# Patient Record
Sex: FEMALE | Race: BLACK OR AFRICAN AMERICAN | Employment: OTHER | URBAN - METROPOLITAN AREA
[De-identification: names, ages, dates, MRNs, and addresses within clinical notes are randomized per-mention and may not be internally consistent; named-entity substitution may affect disease eponyms.]

---

## 2017-03-01 ENCOUNTER — IP HISTORICAL/CONVERTED ENCOUNTER (OUTPATIENT)
Dept: OTHER | Age: 82
End: 2017-03-01

## 2018-06-04 ENCOUNTER — IP HISTORICAL/CONVERTED ENCOUNTER (OUTPATIENT)
Dept: OTHER | Age: 83
End: 2018-06-04

## 2018-06-19 ENCOUNTER — OP HISTORICAL/CONVERTED ENCOUNTER (OUTPATIENT)
Dept: OTHER | Age: 83
End: 2018-06-19

## 2020-12-11 ENCOUNTER — APPOINTMENT (OUTPATIENT)
Dept: GENERAL RADIOLOGY | Age: 85
DRG: 388 | End: 2020-12-11
Attending: EMERGENCY MEDICINE
Payer: MEDICARE

## 2020-12-11 ENCOUNTER — APPOINTMENT (OUTPATIENT)
Dept: CT IMAGING | Age: 85
DRG: 388 | End: 2020-12-11
Attending: EMERGENCY MEDICINE
Payer: MEDICARE

## 2020-12-11 ENCOUNTER — HOSPITAL ENCOUNTER (INPATIENT)
Age: 85
LOS: 20 days | Discharge: NURSING, REHAB OR LONG-TERM CARE | DRG: 388 | End: 2020-12-31
Attending: EMERGENCY MEDICINE | Admitting: COLON & RECTAL SURGERY
Payer: MEDICARE

## 2020-12-11 DIAGNOSIS — K56.601 COMPLETE SMALL BOWEL OBSTRUCTION (HCC): ICD-10-CM

## 2020-12-11 DIAGNOSIS — E87.6 HYPOKALEMIA: ICD-10-CM

## 2020-12-11 DIAGNOSIS — E86.0 DEHYDRATION DETERMINED BY EXAMINATION: ICD-10-CM

## 2020-12-11 DIAGNOSIS — R10.9 ACUTE ABDOMINAL PAIN: Primary | ICD-10-CM

## 2020-12-11 DIAGNOSIS — R64 CACHEXIA (HCC): ICD-10-CM

## 2020-12-11 DIAGNOSIS — R79.89 PRERENAL AZOTEMIA: ICD-10-CM

## 2020-12-11 DIAGNOSIS — K56.609 SMALL BOWEL OBSTRUCTION (HCC): ICD-10-CM

## 2020-12-11 LAB
ALBUMIN SERPL-MCNC: 3.2 G/DL (ref 3.5–5)
ALBUMIN/GLOB SERPL: 0.8 {RATIO} (ref 1.1–2.2)
ALP SERPL-CCNC: 57 U/L (ref 45–117)
ALT SERPL-CCNC: 17 U/L (ref 12–78)
ANION GAP SERPL CALC-SCNC: 8 MMOL/L (ref 5–15)
AST SERPL W P-5'-P-CCNC: 18 U/L (ref 15–37)
BASOPHILS # BLD: 0 K/UL (ref 0–0.1)
BASOPHILS NFR BLD: 0 % (ref 0–1)
BILIRUB SERPL-MCNC: 0.7 MG/DL (ref 0.2–1)
BUN SERPL-MCNC: 54 MG/DL (ref 6–20)
BUN/CREAT SERPL: 37 (ref 12–20)
CA-I BLD-MCNC: 9.3 MG/DL (ref 8.5–10.1)
CHLORIDE SERPL-SCNC: 105 MMOL/L (ref 97–108)
CO2 SERPL-SCNC: 30 MMOL/L (ref 21–32)
CREAT SERPL-MCNC: 1.45 MG/DL (ref 0.55–1.02)
DIFFERENTIAL METHOD BLD: ABNORMAL
EOSINOPHIL # BLD: 0 K/UL (ref 0–0.4)
EOSINOPHIL NFR BLD: 0 % (ref 0–7)
ERYTHROCYTE [DISTWIDTH] IN BLOOD BY AUTOMATED COUNT: 15.2 % (ref 11.5–14.5)
GLOBULIN SER CALC-MCNC: 3.8 G/DL (ref 2–4)
GLUCOSE SERPL-MCNC: 95 MG/DL (ref 65–100)
HCT VFR BLD AUTO: 39.2 % (ref 35–47)
HGB BLD-MCNC: 12.9 G/DL (ref 11.5–16)
IMM GRANULOCYTES # BLD AUTO: 0 K/UL (ref 0–0.04)
IMM GRANULOCYTES NFR BLD AUTO: 0 % (ref 0–0.5)
LIPASE SERPL-CCNC: 82 U/L (ref 73–393)
LYMPHOCYTES # BLD: 0.5 K/UL (ref 0.8–3.5)
LYMPHOCYTES NFR BLD: 11 % (ref 12–49)
MAGNESIUM SERPL-MCNC: 2.7 MG/DL (ref 1.6–2.4)
MCH RBC QN AUTO: 27.6 PG (ref 26–34)
MCHC RBC AUTO-ENTMCNC: 32.9 G/DL (ref 30–36.5)
MCV RBC AUTO: 83.9 FL (ref 80–99)
MONOCYTES # BLD: 0.4 K/UL (ref 0–1)
MONOCYTES NFR BLD: 8 % (ref 5–13)
NEUTS SEG # BLD: 3.9 K/UL (ref 1.8–8)
NEUTS SEG NFR BLD: 81 % (ref 32–75)
PHOSPHATE SERPL-MCNC: 4.4 MG/DL (ref 2.6–4.7)
PLATELET # BLD AUTO: 384 K/UL (ref 150–400)
PMV BLD AUTO: 10.9 FL (ref 8.9–12.9)
POTASSIUM SERPL-SCNC: 3.4 MMOL/L (ref 3.5–5.1)
PROT SERPL-MCNC: 7 G/DL (ref 6.4–8.2)
RBC # BLD AUTO: 4.67 M/UL (ref 3.8–5.2)
SODIUM SERPL-SCNC: 143 MMOL/L (ref 136–145)
WBC # BLD AUTO: 4.8 K/UL (ref 3.6–11)

## 2020-12-11 PROCEDURE — 85025 COMPLETE CBC W/AUTO DIFF WBC: CPT

## 2020-12-11 PROCEDURE — 36415 COLL VENOUS BLD VENIPUNCTURE: CPT

## 2020-12-11 PROCEDURE — 87040 BLOOD CULTURE FOR BACTERIA: CPT

## 2020-12-11 PROCEDURE — 99284 EMERGENCY DEPT VISIT MOD MDM: CPT

## 2020-12-11 PROCEDURE — 83690 ASSAY OF LIPASE: CPT

## 2020-12-11 PROCEDURE — 0D9670Z DRAINAGE OF STOMACH WITH DRAINAGE DEVICE, VIA NATURAL OR ARTIFICIAL OPENING: ICD-10-PCS | Performed by: COLON & RECTAL SURGERY

## 2020-12-11 PROCEDURE — 74022 RADEX COMPL AQT ABD SERIES: CPT

## 2020-12-11 PROCEDURE — 74011250636 HC RX REV CODE- 250/636: Performed by: COLON & RECTAL SURGERY

## 2020-12-11 PROCEDURE — 83735 ASSAY OF MAGNESIUM: CPT

## 2020-12-11 PROCEDURE — 74176 CT ABD & PELVIS W/O CONTRAST: CPT

## 2020-12-11 PROCEDURE — 74011250636 HC RX REV CODE- 250/636: Performed by: EMERGENCY MEDICINE

## 2020-12-11 PROCEDURE — 65270000029 HC RM PRIVATE

## 2020-12-11 PROCEDURE — 84100 ASSAY OF PHOSPHORUS: CPT

## 2020-12-11 PROCEDURE — 99222 1ST HOSP IP/OBS MODERATE 55: CPT | Performed by: COLON & RECTAL SURGERY

## 2020-12-11 PROCEDURE — 3E0336Z INTRODUCTION OF NUTRITIONAL SUBSTANCE INTO PERIPHERAL VEIN, PERCUTANEOUS APPROACH: ICD-10-PCS | Performed by: COLON & RECTAL SURGERY

## 2020-12-11 PROCEDURE — 80053 COMPREHEN METABOLIC PANEL: CPT

## 2020-12-11 RX ORDER — FENTANYL CITRATE 50 UG/ML
10 INJECTION, SOLUTION INTRAMUSCULAR; INTRAVENOUS
Status: DISPENSED | OUTPATIENT
Start: 2020-12-11 | End: 2020-12-12

## 2020-12-11 RX ORDER — ONDANSETRON 2 MG/ML
4 INJECTION INTRAMUSCULAR; INTRAVENOUS
Status: DISCONTINUED | OUTPATIENT
Start: 2020-12-11 | End: 2020-12-31 | Stop reason: HOSPADM

## 2020-12-11 RX ORDER — FENTANYL CITRATE 50 UG/ML
25 INJECTION, SOLUTION INTRAMUSCULAR; INTRAVENOUS
Status: COMPLETED | OUTPATIENT
Start: 2020-12-11 | End: 2020-12-11

## 2020-12-11 RX ORDER — SODIUM CHLORIDE 9 MG/ML
125 INJECTION, SOLUTION INTRAVENOUS CONTINUOUS
Status: DISCONTINUED | OUTPATIENT
Start: 2020-12-11 | End: 2020-12-15

## 2020-12-11 RX ADMIN — FENTANYL CITRATE 25 MCG: 50 INJECTION INTRAMUSCULAR; INTRAVENOUS at 18:38

## 2020-12-11 RX ADMIN — SODIUM CHLORIDE 75 ML/HR: 9 INJECTION, SOLUTION INTRAVENOUS at 18:37

## 2020-12-11 RX ADMIN — FAMOTIDINE 20 MG: 10 INJECTION INTRAVENOUS at 21:58

## 2020-12-11 RX ADMIN — SODIUM CHLORIDE 75 ML/HR: 9 INJECTION, SOLUTION INTRAVENOUS at 22:05

## 2020-12-11 NOTE — ED PROVIDER NOTES
EMERGENCY DEPARTMENT HISTORY AND PHYSICAL EXAM        Date: 12/11/2020  Patient Name: Christy Reed    History of Presenting Illness     Chief Complaint   Patient presents with    Abdominal Pain       History Provided By: Patient    HPI: Christy Reed, 80 y.o. female with a history of HTN presents to the ED c/o intermittent abdominal pain that began 2 weeks ago. Pt has accompanying constipation. Vomiting, and decreased appetite, sweats, and intermittent nausea. She denies any dysuria. Her episodes last 2-3 days. Pt had previous episodes of constipation in the past, but her current symptoms are not similar. Pt took her HTN medications this morning. She is uncertain of what her previous abdominal surgery was for. She normally ambulates with a cane; she denies any falls. Pt normally lives at home alone, but has her niece visits her. PCP: Chay Gudino MD    Current Facility-Administered Medications   Medication Dose Route Frequency Provider Last Rate Last Dose    metoprolol (LOPRESSOR) injection 2.5 mg  2.5 mg IntraVENous Q4H Lisy Kumar, NP   2.5 mg at 12/13/20 1616    acetaminophen (TYLENOL) suppository 650 mg  650 mg Rectal Q4H PRN Lisy Kumar NP        famotidine (PF) (PEPCID) 20 mg in 0.9% sodium chloride 10 mL injection  20 mg IntraVENous Q12H Shan Alejandro MD   20 mg at 12/13/20 6175    ondansetron (ZOFRAN) injection 4 mg  4 mg IntraVENous Q6H PRN Shan Alejandro MD        0.9% sodium chloride infusion  125 mL/hr IntraVENous CONTINUOUS Lisy Kumar  mL/hr at 12/13/20 1231 125 mL/hr at 12/13/20 1231       Past History     Past Medical History:  Past Medical History:   Diagnosis Date    Hypertension        Past Surgical History:  History reviewed. No pertinent surgical history. Family History:  History reviewed. No pertinent family history.     Social History:  Social History     Tobacco Use    Smoking status: Never Smoker    Smokeless tobacco: Never Used   Substance Use Topics    Alcohol use: Not Currently     Frequency: Never    Drug use: Not Currently       Allergies:  No Known Allergies    Review of Systems   Review of Systems   Constitutional: Positive for appetite change. Negative for chills, diaphoresis and fever. HENT: Negative for congestion, sore throat and trouble swallowing. Eyes: Negative for visual disturbance. Respiratory: Negative for cough and shortness of breath. Cardiovascular: Negative for chest pain and palpitations. Gastrointestinal: Positive for abdominal pain, constipation, nausea and vomiting. Negative for diarrhea. Endocrine: Negative for polydipsia, polyphagia and polyuria. Genitourinary: Negative for dysuria, frequency, hematuria and urgency. Musculoskeletal: Negative for gait problem and neck pain. Skin: Negative for rash. Neurological: Negative for dizziness, syncope and headaches. Physical Exam   Physical Exam  Constitutional:       General: She is not in acute distress. Appearance: She is well-developed. She is not ill-appearing. Comments: Cachectic and frail AA female. Good historian, intermittently stops giving history due to abdominal pain. Somnolent but easily arousal. Pt reports that she lives alone and has her niece check up on her. HENT:      Head: Normocephalic and atraumatic. Nose: Nose normal.      Mouth/Throat:      Mouth: Mucous membranes are moist.      Pharynx: No posterior oropharyngeal erythema. Eyes:      General: Vision grossly intact. Extraocular Movements: Extraocular movements intact. Conjunctiva/sclera: Conjunctivae normal.      Pupils: Pupils are equal, round, and reactive to light. Neck:      Musculoskeletal: Neck supple. Vascular: No JVD. Trachea: No tracheal deviation. Cardiovascular:      Rate and Rhythm: Normal rate and regular rhythm. Pulses: Normal pulses.            Carotid pulses are 2+ on the right side and 2+ on the left side.       Radial pulses are 2+ on the right side and 2+ on the left side. Femoral pulses are 2+ on the right side and 2+ on the left side. Popliteal pulses are 2+ on the right side and 2+ on the left side. Dorsalis pedis pulses are 2+ on the right side and 2+ on the left side. Posterior tibial pulses are 2+ on the right side and 2+ on the left side. Heart sounds: Normal heart sounds. Pulmonary:      Effort: Pulmonary effort is normal.      Breath sounds: Normal breath sounds and air entry. No wheezing or rhonchi. Abdominal:      General: Abdomen is flat. Bowel sounds are absent. Palpations: Abdomen is soft. Tenderness: There is no abdominal tenderness. There is no guarding or rebound. Comments: Well healed midline surgical scar; no palpable masses. Diffuse abd pain with palpation--no rebound; positve guarding   Musculoskeletal:         General: No swelling or deformity. Right shoulder: She exhibits normal range of motion and no swelling. Right lower leg: No edema. Left lower leg: No edema. Skin:     General: Skin is warm and dry. Capillary Refill: Capillary refill takes less than 2 seconds. Findings: No signs of injury or rash. Neurological:      General: No focal deficit present. Mental Status: She is alert and oriented to person, place, and time. Cranial Nerves: No cranial nerve deficit. Comments: Normal Speech,    Psychiatric:         Attention and Perception: Attention normal.         Mood and Affect: Mood and affect normal.         Speech: Speech normal.         Behavior: Behavior is cooperative.          Diagnostic Study Results     Labs -     Recent Results (from the past 12 hour(s))   CBC WITH AUTOMATED DIFF    Collection Time: 12/13/20  7:26 AM   Result Value Ref Range    WBC 7.8 3.6 - 11.0 K/uL    RBC 4.06 3.80 - 5.20 M/uL    HGB 10.8 (L) 11.5 - 16.0 g/dL    HCT 34.3 (L) 35.0 - 47.0 %    MCV 84.5 80.0 - 99.0 FL MCH 26.6 26.0 - 34.0 PG    MCHC 31.5 30.0 - 36.5 g/dL    RDW 15.1 (H) 11.5 - 14.5 %    PLATELET 656 254 - 848 K/uL    MPV 10.9 8.9 - 12.9 FL    NEUTROPHILS 87 (H) 32 - 75 %    LYMPHOCYTES 6 (L) 12 - 49 %    MONOCYTES 7 5 - 13 %    EOSINOPHILS 0 0 - 7 %    BASOPHILS 0 0 - 1 %    IMMATURE GRANULOCYTES 0 0.0 - 0.5 %    ABS. NEUTROPHILS 6.7 1.8 - 8.0 K/UL    ABS. LYMPHOCYTES 0.5 (L) 0.8 - 3.5 K/UL    ABS. MONOCYTES 0.5 0.0 - 1.0 K/UL    ABS. EOSINOPHILS 0.0 0.0 - 0.4 K/UL    ABS. BASOPHILS 0.0 0.0 - 0.1 K/UL    ABS. IMM. GRANS. 0.0 0.00 - 0.04 K/UL    DF AUTOMATED     METABOLIC PANEL, BASIC    Collection Time: 12/13/20  7:26 AM   Result Value Ref Range    Sodium 147 (H) 136 - 145 mmol/L    Potassium 3.3 (L) 3.5 - 5.1 mmol/L    Chloride 111 (H) 97 - 108 mmol/L    CO2 23 21 - 32 mmol/L    Anion gap 13 5 - 15 mmol/L    Glucose 58 (L) 65 - 100 mg/dL    BUN 58 (H) 6 - 20 mg/dL    Creatinine 1.19 (H) 0.55 - 1.02 mg/dL    BUN/Creatinine ratio 49 (H) 12 - 20      GFR est AA 51 (L) >60 ml/min/1.73m2    GFR est non-AA 42 (L) >60 ml/min/1.73m2    Calcium 8.7 8.5 - 10.1 mg/dL       Radiologic Studies -   XR ABD (KUB)   Final Result      CT ABD PELV WO CONT   Final Result   Impression:   1. High-grade small bowel obstruction with a transition point in the mid   abdomen involving the distal small bowel. No evidence of perforation. Small   amount of ascites. 2.  Small bilateral pleural effusions. 3.  Large volume of stool within the rectum consistent with constipation. 4.  Multiple bilateral hyperattenuating renal lesions, likely consistent with   proteinaceous or hemorrhagic cysts. If indicated, consider follow-up CT in 6   months. 5.  Coronary artery disease and atherosclerotic disease of the aorta. XR ABD ACUTE W 1 V CHEST   Final Result   Impression:   1. No acute cardiopulmonary process. Hyperlucent lungs could indicate COPD   changes. 2.  Dilated loop of bowel in the midabdomen, possibly small bowel. Recommend CT   of the abdomen and pelvis for further evaluation. CT ABD PELV WO CONT    (Results Pending)     CT Results  (Last 48 hours)    None        CXR Results  (Last 48 hours)    None          Medical Decision Making and ED Course     I reviewed the vital signs, available nursing notes, past medical history, past surgical history, family history and social history. Vital Signs - Reviewed the patient's vital signs. Patient Vitals for the past 12 hrs:   Temp Pulse Resp BP SpO2   12/13/20 1613 98.9 °F (37.2 °C) 98  (!) 163/78    12/13/20 1504 (!) 101.8 °F (38.8 °C) 73 20 (!) 164/76 98 %   12/13/20 1232  89  (!) 161/73    12/13/20 0830 98.4 °F (36.9 °C) 85 20 (!) 162/74 98 %         Records Reviewed: Nursing Notes, Old Medical Records, Previous electrocardiograms and Ambulance Run Sheet        Medical Decision Making/DIFF DX:     Very pleasant nonagenarian presents with several weeks of intermittent abd pain that is acute worse this evening  Hx and exam concerning for bowel obstruction but volvulus and ileus also a concern. ABD series demonstrates high grade obstruction ( call from rad) and awaiting CT read that is already ordered. Will consult GS on call. Will do screening labs,fluid resuscitation as pt is clinically very dry, pain medications and planned admission. NGT has also been ordered for nursing placement     Diff dx discussed with patient who acknowledges understanding. .. Procedures         Disposition     ADMITTED       Diagnosis     Clinical impression:   1. Acute abdominal pain    2. Complete small bowel obstruction (HCC)    3. Dehydration determined by examination    4. Cachexia ([address]    5. Hypokalemia    6. Prerenal azotemia           Attestation:  I, [Dave Otoole] am scribing for, and in the presence of Dr. Madelyn Rodriguez MD]   I, Dr Madelyn Rodriguez MD], have reviewed any and all documentation by a scribe and authenticated its accuracy.

## 2020-12-11 NOTE — H&P
Surgery History and Physical    Subjective:      Mitali Allen is a 80 y.o. female who presents for evaluation of abdominal pain accompanied by nausea and vomiting. . The pain is described as sharp, cramping, intermittent. Onset was a few days ago. Patient is unable to give me any aggravating or alleviating factors. She does state that she thought she had constipation but then started vomiting. She is a fairly poor historian unable to obtain any significant past medical or past surgical history. Past Medical History:   Diagnosis Date    Hypertension      History reviewed. No pertinent surgical history. History reviewed. No pertinent family history. Social History     Tobacco Use    Smoking status: Never Smoker    Smokeless tobacco: Never Used   Substance Use Topics    Alcohol use: Not Currently     Frequency: Never      Prior to Admission medications    Not on File      No Known Allergies    Review of Systems:  Review of Systems   Unable to perform ROS: Mental acuity       Objective:        Patient Vitals for the past 8 hrs:   BP Temp Pulse Resp SpO2 Height Weight   20 1407 (!) 154/90 98.5 °F (36.9 °C) 65 16 98 % 5' 3\" (1.6 m) 110 lb (49.9 kg)       Temp (24hrs), Av.5 °F (36.9 °C), Min:98.5 °F (36.9 °C), Max:98.5 °F (36.9 °C)      Physical Exam:  Physical Exam  Constitutional:       General: She is not in acute distress. Appearance: She is not ill-appearing. Comments: Frail elderly female   HENT:      Head: Normocephalic and atraumatic. Neck:      Musculoskeletal: Normal range of motion. Cardiovascular:      Rate and Rhythm: Normal rate and regular rhythm. Pulmonary:      Effort: Pulmonary effort is normal.      Breath sounds: Normal breath sounds. Abdominal:      General: There is distension. Palpations: Abdomen is soft. Tenderness: There is abdominal tenderness.       Comments: Abdomen is mildly distended, tender palpation, there is no rebound or guarding  Well-healed lower abdominal incision   Musculoskeletal: Normal range of motion.   Skin:     General: Skin is warm and dry.   Neurological:      General: No focal deficit present.   Psychiatric:         Mood and Affect: Mood normal.       Recent Results (from the past 24 hour(s))   CBC WITH AUTOMATED DIFF    Collection Time: 12/11/20  5:00 PM   Result Value Ref Range    WBC 4.8 3.6 - 11.0 K/uL    RBC 4.67 3.80 - 5.20 M/uL    HGB 12.9 11.5 - 16.0 g/dL    HCT 39.2 35.0 - 47.0 %    MCV 83.9 80.0 - 99.0 FL    MCH 27.6 26.0 - 34.0 PG    MCHC 32.9 30.0 - 36.5 g/dL    RDW 15.2 (H) 11.5 - 14.5 %    PLATELET 384 150 - 400 K/uL    MPV 10.9 8.9 - 12.9 FL    NEUTROPHILS 81 (H) 32 - 75 %    LYMPHOCYTES 11 (L) 12 - 49 %    MONOCYTES 8 5 - 13 %    EOSINOPHILS 0 0 - 7 %    BASOPHILS 0 0 - 1 %    IMMATURE GRANULOCYTES 0 0.0 - 0.5 %    ABS. NEUTROPHILS 3.9 1.8 - 8.0 K/UL    ABS. LYMPHOCYTES 0.5 (L) 0.8 - 3.5 K/UL    ABS. MONOCYTES 0.4 0.0 - 1.0 K/UL    ABS. EOSINOPHILS 0.0 0.0 - 0.4 K/UL    ABS. BASOPHILS 0.0 0.0 - 0.1 K/UL    ABS. IMM. GRANS. 0.0 0.00 - 0.04 K/UL    DF AUTOMATED         Radiology:  CT abdomen pelvis:  GI tract/bowel: Fluid is seen in the distal esophagus. The stomach is distended  with fluid and air. The small bowel is not significantly distended the terminal  ileum and distal small bowel is decompressed. Within the mid abdomen there is a  transition point in the distal small bowel causing a high-grade obstruction. The  colon is normal in caliber with extensive diverticulosis. No evidence of  diverticulitis. Large volume of stool within the rectum.    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  Patient has had prior abdominal surgery.  It appears she has had a hysterectomy through a lower midline incision.  She has no significant leukocytosis.  Her CMP is pending.   Would recommend admission with nasogastric tube decompression. IV hydration. Repeat labs in a.m. with repeat x-rays in a.m.

## 2020-12-12 LAB
ALBUMIN SERPL-MCNC: 3.1 G/DL (ref 3.5–5)
ALBUMIN/GLOB SERPL: 0.9 {RATIO} (ref 1.1–2.2)
ALP SERPL-CCNC: 57 U/L (ref 45–117)
ALT SERPL-CCNC: 14 U/L (ref 12–78)
ANION GAP SERPL CALC-SCNC: 9 MMOL/L (ref 5–15)
AST SERPL W P-5'-P-CCNC: 18 U/L (ref 15–37)
BASOPHILS # BLD: 0 K/UL (ref 0–0.1)
BASOPHILS NFR BLD: 0 % (ref 0–1)
BILIRUB SERPL-MCNC: 0.7 MG/DL (ref 0.2–1)
BUN SERPL-MCNC: 60 MG/DL (ref 6–20)
BUN/CREAT SERPL: 37 (ref 12–20)
CA-I BLD-MCNC: 9.1 MG/DL (ref 8.5–10.1)
CHLORIDE SERPL-SCNC: 106 MMOL/L (ref 97–108)
CO2 SERPL-SCNC: 29 MMOL/L (ref 21–32)
CREAT SERPL-MCNC: 1.63 MG/DL (ref 0.55–1.02)
DIFFERENTIAL METHOD BLD: ABNORMAL
EOSINOPHIL # BLD: 0 K/UL (ref 0–0.4)
EOSINOPHIL NFR BLD: 0 % (ref 0–7)
ERYTHROCYTE [DISTWIDTH] IN BLOOD BY AUTOMATED COUNT: 15.1 % (ref 11.5–14.5)
GLOBULIN SER CALC-MCNC: 3.5 G/DL (ref 2–4)
GLUCOSE BLD STRIP.AUTO-MCNC: 110 MG/DL (ref 65–100)
GLUCOSE BLD STRIP.AUTO-MCNC: 122 MG/DL (ref 65–100)
GLUCOSE SERPL-MCNC: 113 MG/DL (ref 65–100)
HCT VFR BLD AUTO: 36.5 % (ref 35–47)
HGB BLD-MCNC: 12 G/DL (ref 11.5–16)
IMM GRANULOCYTES # BLD AUTO: 0 K/UL (ref 0–0.04)
IMM GRANULOCYTES NFR BLD AUTO: 0 % (ref 0–0.5)
LYMPHOCYTES # BLD: 0.5 K/UL (ref 0.8–3.5)
LYMPHOCYTES NFR BLD: 4 % (ref 12–49)
MCH RBC QN AUTO: 27.6 PG (ref 26–34)
MCHC RBC AUTO-ENTMCNC: 32.9 G/DL (ref 30–36.5)
MCV RBC AUTO: 83.9 FL (ref 80–99)
MONOCYTES # BLD: 1.3 K/UL (ref 0–1)
MONOCYTES NFR BLD: 10 % (ref 5–13)
NEUTS SEG # BLD: 11.4 K/UL (ref 1.8–8)
NEUTS SEG NFR BLD: 86 % (ref 32–75)
PERFORMED BY, TECHID: ABNORMAL
PERFORMED BY, TECHID: ABNORMAL
PLATELET # BLD AUTO: 370 K/UL (ref 150–400)
PMV BLD AUTO: 10.9 FL (ref 8.9–12.9)
POTASSIUM SERPL-SCNC: 3.2 MMOL/L (ref 3.5–5.1)
PROT SERPL-MCNC: 6.6 G/DL (ref 6.4–8.2)
RBC # BLD AUTO: 4.35 M/UL (ref 3.8–5.2)
SODIUM SERPL-SCNC: 144 MMOL/L (ref 136–145)
WBC # BLD AUTO: 13.2 K/UL (ref 3.6–11)

## 2020-12-12 PROCEDURE — 87635 SARS-COV-2 COVID-19 AMP PRB: CPT

## 2020-12-12 PROCEDURE — 36415 COLL VENOUS BLD VENIPUNCTURE: CPT

## 2020-12-12 PROCEDURE — 82962 GLUCOSE BLOOD TEST: CPT

## 2020-12-12 PROCEDURE — 94762 N-INVAS EAR/PLS OXIMTRY CONT: CPT

## 2020-12-12 PROCEDURE — 65270000029 HC RM PRIVATE

## 2020-12-12 PROCEDURE — 80053 COMPREHEN METABOLIC PANEL: CPT

## 2020-12-12 PROCEDURE — 99232 SBSQ HOSP IP/OBS MODERATE 35: CPT | Performed by: COLON & RECTAL SURGERY

## 2020-12-12 PROCEDURE — 74011250636 HC RX REV CODE- 250/636: Performed by: COLON & RECTAL SURGERY

## 2020-12-12 PROCEDURE — 85025 COMPLETE CBC W/AUTO DIFF WBC: CPT

## 2020-12-12 PROCEDURE — 87086 URINE CULTURE/COLONY COUNT: CPT

## 2020-12-12 PROCEDURE — 74011250636 HC RX REV CODE- 250/636: Performed by: NURSE PRACTITIONER

## 2020-12-12 RX ORDER — POTASSIUM CHLORIDE 7.45 MG/ML
10 INJECTION INTRAVENOUS
Status: COMPLETED | OUTPATIENT
Start: 2020-12-12 | End: 2020-12-13

## 2020-12-12 RX ORDER — METOPROLOL TARTRATE 5 MG/5ML
5 INJECTION INTRAVENOUS
Status: DISCONTINUED | OUTPATIENT
Start: 2020-12-12 | End: 2020-12-13

## 2020-12-12 RX ADMIN — POTASSIUM CHLORIDE 10 MEQ: 7.46 INJECTION, SOLUTION INTRAVENOUS at 17:33

## 2020-12-12 RX ADMIN — FAMOTIDINE 20 MG: 10 INJECTION INTRAVENOUS at 21:42

## 2020-12-12 RX ADMIN — SODIUM CHLORIDE, POTASSIUM CHLORIDE, SODIUM LACTATE AND CALCIUM CHLORIDE 250 ML: 600; 310; 30; 20 INJECTION, SOLUTION INTRAVENOUS at 16:57

## 2020-12-12 RX ADMIN — FAMOTIDINE 20 MG: 10 INJECTION INTRAVENOUS at 09:59

## 2020-12-12 RX ADMIN — POTASSIUM CHLORIDE 10 MEQ: 7.46 INJECTION, SOLUTION INTRAVENOUS at 20:25

## 2020-12-12 NOTE — PROGRESS NOTES
Nurse attempted x 2  to call Jovanna Griggs (Daughter) at [phone] to give update on patient status. No answer x 2. Phone continues to ring without pickup of voicemail.

## 2020-12-12 NOTE — PROGRESS NOTES
Patient transferred to [address] [place] [address] Rd room 428. Report called to [employer].

## 2020-12-12 NOTE — CONSULTS
Consult Hospitalist History and Physical    Patient: Wing Patrick MRN: [id number]  SSN: [id number]    YOB: 1927  Age: 80 y.o. Sex: female          Subjective:   Reason for Consult: Medical Management    Reason for admission:   Wing Patrick is a 80 y.o. female who admitted 12/11/2020. Past medical history significant for hypertension and previous abdominal surgeries, otherwise unknown. She comes into the emergency room for complaints of nausea, vomiting and acute abdominal pain. Symptoms started a few days ago and have not improved. Poor historian, questionable dementia. CT of abdomen and pelvis reveals air-filled stomach, distended ileum, distal bowel high-grade obstruction, Extensive diverticulosis no diverticulitis and large volume stool within the rectum. WBC count 13.2, potassium 3.2, BUN 60, creatinine 1.63. Blood pressures systolic 515R to 192N over 90s. Patient was admitted by general surgery for further management of small bowel obstruction, acute constipation, nausea, vomiting and abdominal pain. Internal medicine consulted for medical management of blood pressures. .     Past Medical History:   Diagnosis Date    Hypertension      History reviewed. No pertinent surgical history. History reviewed. No pertinent family history.   Social History     Tobacco Use    Smoking status: Never Smoker    Smokeless tobacco: Never Used   Substance Use Topics    Alcohol use: Not Currently     Frequency: Never      Prior to Admission medications    Not on File        No Known Allergies    Review of Systems:  Unreliable due to altered mental status, metabolic encephalopathy, questionable dementia    Objective:     Vitals:    12/12/20 0200 12/12/20 0717 12/12/20 0901 12/12/20 1208   BP: 124/64 132/66  120/89   Pulse: 90 94  73   Resp: 18 18  18   Temp: 98.1 °F (36.7 °C) 99.3 °F (37.4 °C)  97.6 °F (36.4 °C)   SpO2: 96% 96% 96%    Weight:       Height:            Physical Exam:  General: Chronically ill, lethargic, cooperative, no distress  Eye: conjunctivae/corneas clear. PERRL, EOM's intact. Throat and Neck: normal and no erythema or exudates noted. No mass   Lung: clear to auscultation bilaterally  Heart: regular rate and rhythm,   Abdomen: soft, ++++-tender. Bowel sounds abnormal. No masses,  Extremities:  able to move all extremities normal, atraumatic  Skin: Normal.  Neurologic: Confused. Profoundly weak  Psychiatric: non focal    Recent Results (from the past 24 hour(s))   CBC WITH AUTOMATED DIFF    Collection Time: 12/11/20  5:00 PM   Result Value Ref Range    WBC 4.8 3.6 - 11.0 K/uL    RBC 4.67 3.80 - 5.20 M/uL    HGB 12.9 11.5 - 16.0 g/dL    HCT 39.2 35.0 - 47.0 %    MCV 83.9 80.0 - 99.0 FL    MCH 27.6 26.0 - 34.0 PG    MCHC 32.9 30.0 - 36.5 g/dL    RDW 15.2 (H) 11.5 - 14.5 %    PLATELET 564 677 - 976 K/uL    MPV 10.9 8.9 - 12.9 FL    NEUTROPHILS 81 (H) 32 - 75 %    LYMPHOCYTES 11 (L) 12 - 49 %    MONOCYTES 8 5 - 13 %    EOSINOPHILS 0 0 - 7 %    BASOPHILS 0 0 - 1 %    IMMATURE GRANULOCYTES 0 0.0 - 0.5 %    ABS. NEUTROPHILS 3.9 1.8 - 8.0 K/UL    ABS. LYMPHOCYTES 0.5 (L) 0.8 - 3.5 K/UL    ABS. MONOCYTES 0.4 0.0 - 1.0 K/UL    ABS. EOSINOPHILS 0.0 0.0 - 0.4 K/UL    ABS. BASOPHILS 0.0 0.0 - 0.1 K/UL    ABS. IMM. GRANS. 0.0 0.00 - 0.04 K/UL    DF AUTOMATED     METABOLIC PANEL, COMPREHENSIVE    Collection Time: 12/11/20  5:00 PM   Result Value Ref Range    Sodium 143 136 - 145 mmol/L    Potassium 3.4 (L) 3.5 - 5.1 mmol/L    Chloride 105 97 - 108 mmol/L    CO2 30 21 - 32 mmol/L    Anion gap 8 5 - 15 mmol/L    Glucose 95 65 - 100 mg/dL    BUN 54 (H) 6 - 20 mg/dL    Creatinine 1.45 (H) 0.55 - 1.02 mg/dL    BUN/Creatinine ratio 37 (H) 12 - 20      GFR est AA 41 (L) >60 ml/min/1.73m2    GFR est non-AA 34 (L) >60 ml/min/1.73m2    Calcium 9.3 8.5 - 10.1 mg/dL    Bilirubin, total 0.7 0.2 - 1.0 mg/dL    AST (SGOT) 18 15 - 37 U/L    ALT (SGPT) 17 12 - 78 U/L    Alk.  phosphatase 57 45 - 117 U/L Protein, total 7.0 6.4 - 8.2 g/dL    Albumin 3.2 (L) 3.5 - 5.0 g/dL    Globulin 3.8 2.0 - 4.0 g/dL    A-G Ratio 0.8 (L) 1.1 - 2.2     LIPASE    Collection Time: 12/11/20  5:00 PM   Result Value Ref Range    Lipase 82 73 - 393 U/L   MAGNESIUM    Collection Time: 12/11/20  5:00 PM   Result Value Ref Range    Magnesium 2.7 (H) 1.6 - 2.4 mg/dL   PHOSPHORUS    Collection Time: 12/11/20  5:00 PM   Result Value Ref Range    Phosphorus 4.4 2.6 - 4.7 mg/dL   GLUCOSE, POC    Collection Time: 12/12/20 12:53 AM   Result Value Ref Range    Glucose (POC) 122 (H) 65 - 100 mg/dL    Performed by Cristiana Ornelas ([place])    CBC WITH AUTOMATED DIFF    Collection Time: 12/12/20  1:35 AM   Result Value Ref Range    WBC 13.2 (H) 3.6 - 11.0 K/uL    RBC 4.35 3.80 - 5.20 M/uL    HGB 12.0 11.5 - 16.0 g/dL    HCT 36.5 35.0 - 47.0 %    MCV 83.9 80.0 - 99.0 FL    MCH 27.6 26.0 - 34.0 PG    MCHC 32.9 30.0 - 36.5 g/dL    RDW 15.1 (H) 11.5 - 14.5 %    PLATELET 150 704 - 894 K/uL    MPV 10.9 8.9 - 12.9 FL    NEUTROPHILS 86 (H) 32 - 75 %    LYMPHOCYTES 4 (L) 12 - 49 %    MONOCYTES 10 5 - 13 %    EOSINOPHILS 0 0 - 7 %    BASOPHILS 0 0 - 1 %    IMMATURE GRANULOCYTES 0 0.0 - 0.5 %    ABS. NEUTROPHILS 11.4 (H) 1.8 - 8.0 K/UL    ABS. LYMPHOCYTES 0.5 (L) 0.8 - 3.5 K/UL    ABS. MONOCYTES 1.3 (H) 0.0 - 1.0 K/UL    ABS. EOSINOPHILS 0.0 0.0 - 0.4 K/UL    ABS. BASOPHILS 0.0 0.0 - 0.1 K/UL    ABS. IMM.  GRANS. 0.0 0.00 - 0.04 K/UL    DF AUTOMATED     METABOLIC PANEL, COMPREHENSIVE    Collection Time: 12/12/20  1:35 AM   Result Value Ref Range    Sodium 144 136 - 145 mmol/L    Potassium 3.2 (L) 3.5 - 5.1 mmol/L    Chloride 106 97 - 108 mmol/L    CO2 29 21 - 32 mmol/L    Anion gap 9 5 - 15 mmol/L    Glucose 113 (H) 65 - 100 mg/dL    BUN 60 (H) 6 - 20 mg/dL    Creatinine 1.63 (H) 0.55 - 1.02 mg/dL    BUN/Creatinine ratio 37 (H) 12 - 20      GFR est AA 36 (L) >60 ml/min/1.73m2    GFR est non-AA 29 (L) >60 ml/min/1.73m2    Calcium 9.1 8.5 - 10.1 mg/dL Bilirubin, total 0.7 0.2 - 1.0 mg/dL    AST (SGOT) 18 15 - 37 U/L    ALT (SGPT) 14 12 - 78 U/L    Alk. phosphatase 57 45 - 117 U/L    Protein, total 6.6 6.4 - 8.2 g/dL    Albumin 3.1 (L) 3.5 - 5.0 g/dL    Globulin 3.5 2.0 - 4.0 g/dL    A-G Ratio 0.9 (L) 1.1 - 2.2     GLUCOSE, POC    Collection Time: 20  7:20 AM   Result Value Ref Range    Glucose (POC) 110 (H) 65 - 100 mg/dL    Performed by Jazmyne Pancake      CT ABD PELV WO CONT   Final Result   Impression:   1. High-grade small bowel obstruction with a transition point in the mid   abdomen involving the distal small bowel. No evidence of perforation. Small   amount of ascites. 2.  Small bilateral pleural effusions. 3.  Large volume of stool within the rectum consistent with constipation. 4.  Multiple bilateral hyperattenuating renal lesions, likely consistent with   proteinaceous or hemorrhagic cysts. If indicated, consider follow-up CT in 6   months. 5.  Coronary artery disease and atherosclerotic disease of the aorta. XR ABD ACUTE W 1 V CHEST   Final Result   Impression:   1. No acute cardiopulmonary process. Hyperlucent lungs could indicate COPD   changes. 2.  Dilated loop of bowel in the midabdomen, possibly small bowel. Recommend CT   of the abdomen and pelvis for further evaluation. Hospital Problems  Never Reviewed          Codes Class Noted POA    Small bowel obstruction ([facility] [address] ICD-10-CM: M90.907  ICD-9-CM: 560.9  2020 Unknown              Assessment/Plan:          1. Small bowel obstruction:  2. Constipation, stool impact meant:  3. Nausea and vomitin. Abdominal pain:  · CT of pelvis reveals distended ileum, distal bowel high-grade obstruction, diverticulosis without diverticulitis and large volume stool in rectum  · IV fluids normal saline 125 an hour  · NG tube to decompress  · Suggest soapsuds enemas as needed until stool rectum impaction cleared  · IV PPI, IV antiemetics    5. Dehydration:  4.   Acute kidney injury: Secondary to poor oral intake, SBO, N/P and dehydration  · IV fluids normal saline 125 an hour  · Monitor labs closely replace electrolytes  · Check UA  · Bladder scan if needed, insert Javed if needed    5. Hypertension:  · Lopressor 5 mg every 6 hours as needed SBP greater than 160    6. Profound weakness:  7. Failure to thrive:  · When acute symptoms resolved would recommend PT OT and case management for disposition needs    SCDs for DVT  Restrained for safety and pulling at medical devices        Thank you for the consult and for allowing us to participate in the care of this patient. Please do not hesitate to call with any questions or concerns. I will continue to follow along during this admission.     Time spent:       Signed By: Ruben Carmona NP     December 12, 2020

## 2020-12-12 NOTE — PROGRESS NOTES
Admission skin assessment completed with Shivani Beltran RN. Skin, clean,dry and intact. No skin issues noted.

## 2020-12-12 NOTE — ROUTINE PROCESS
Bedside and verbal shift change report given to Yen Escobedo RN  (oncoming nurse) by Rhea Lanza RN (offgoing nurse). Report included the following information SBAR, Kardex, Intake/Output, MAR, Recent Results, and Quality Measures.

## 2020-12-12 NOTE — PROGRESS NOTES
Progress Note    Patient: Areli Adams MRN: [id number]  SSN: [id number]    YOB: 1927  Age: 80 y.o. Sex: female      Admit Date: 12/11/2020    LOS: 1 day     Subjective:   27-year-old female admitted with bowel obstruction as well as fecal loading. Apparently the transition point in the distal small bowel. I reviewed the CT scan images myself which were noncontrast with no oral contrast.  Patient is admitted with plans for nasogastric to have removed her nasogastric tube 3 times overnight. No further nasogastric tube was placed. Currently on talking with her she seems a bit confused. She states that her abdomen hurts. Objective:     Vitals:    12/12/20 0200 12/12/20 0717 12/12/20 0901 12/12/20 1208   BP: 124/64 132/66  120/89   Pulse: 90 94  73   Resp: 18 18  18   Temp: 98.1 °F (36.7 °C) 99.3 °F (37.4 °C)  97.6 °F (36.4 °C)   SpO2: 96% 96% 96%    Weight:       Height:            Intake and Output:  Current Shift: No intake/output data recorded. Last three shifts: No intake/output data recorded. Review of Systems:  ROS     Physical Exam:   Abdomen is soft, mildly distended, tender to palpation. Lab/Data Review:  Recent Results (from the past 24 hour(s))   CBC WITH AUTOMATED DIFF    Collection Time: 12/11/20  5:00 PM   Result Value Ref Range    WBC 4.8 3.6 - 11.0 K/uL    RBC 4.67 3.80 - 5.20 M/uL    HGB 12.9 11.5 - 16.0 g/dL    HCT 39.2 35.0 - 47.0 %    MCV 83.9 80.0 - 99.0 FL    MCH 27.6 26.0 - 34.0 PG    MCHC 32.9 30.0 - 36.5 g/dL    RDW 15.2 (H) 11.5 - 14.5 %    PLATELET 419 688 - 015 K/uL    MPV 10.9 8.9 - 12.9 FL    NEUTROPHILS 81 (H) 32 - 75 %    LYMPHOCYTES 11 (L) 12 - 49 %    MONOCYTES 8 5 - 13 %    EOSINOPHILS 0 0 - 7 %    BASOPHILS 0 0 - 1 %    IMMATURE GRANULOCYTES 0 0.0 - 0.5 %    ABS. NEUTROPHILS 3.9 1.8 - 8.0 K/UL    ABS. LYMPHOCYTES 0.5 (L) 0.8 - 3.5 K/UL    ABS. MONOCYTES 0.4 0.0 - 1.0 K/UL    ABS. EOSINOPHILS 0.0 0.0 - 0.4 K/UL    ABS.  BASOPHILS 0.0 0.0 - 0.1 K/UL    ABS. IMM. GRANS. 0.0 0.00 - 0.04 K/UL    DF AUTOMATED     METABOLIC PANEL, COMPREHENSIVE    Collection Time: 12/11/20  5:00 PM   Result Value Ref Range    Sodium 143 136 - 145 mmol/L    Potassium 3.4 (L) 3.5 - 5.1 mmol/L    Chloride 105 97 - 108 mmol/L    CO2 30 21 - 32 mmol/L    Anion gap 8 5 - 15 mmol/L    Glucose 95 65 - 100 mg/dL    BUN 54 (H) 6 - 20 mg/dL    Creatinine 1.45 (H) 0.55 - 1.02 mg/dL    BUN/Creatinine ratio 37 (H) 12 - 20      GFR est AA 41 (L) >60 ml/min/1.73m2    GFR est non-AA 34 (L) >60 ml/min/1.73m2    Calcium 9.3 8.5 - 10.1 mg/dL    Bilirubin, total 0.7 0.2 - 1.0 mg/dL    AST (SGOT) 18 15 - 37 U/L    ALT (SGPT) 17 12 - 78 U/L    Alk. phosphatase 57 45 - 117 U/L    Protein, total 7.0 6.4 - 8.2 g/dL    Albumin 3.2 (L) 3.5 - 5.0 g/dL    Globulin 3.8 2.0 - 4.0 g/dL    A-G Ratio 0.8 (L) 1.1 - 2.2     LIPASE    Collection Time: 12/11/20  5:00 PM   Result Value Ref Range    Lipase 82 73 - 393 U/L   MAGNESIUM    Collection Time: 12/11/20  5:00 PM   Result Value Ref Range    Magnesium 2.7 (H) 1.6 - 2.4 mg/dL   PHOSPHORUS    Collection Time: 12/11/20  5:00 PM   Result Value Ref Range    Phosphorus 4.4 2.6 - 4.7 mg/dL   GLUCOSE, POC    Collection Time: 12/12/20 12:53 AM   Result Value Ref Range    Glucose (POC) 122 (H) 65 - 100 mg/dL    Performed by Areta Flow (Float Pool)    CBC WITH AUTOMATED DIFF    Collection Time: 12/12/20  1:35 AM   Result Value Ref Range    WBC 13.2 (H) 3.6 - 11.0 K/uL    RBC 4.35 3.80 - 5.20 M/uL    HGB 12.0 11.5 - 16.0 g/dL    HCT 36.5 35.0 - 47.0 %    MCV 83.9 80.0 - 99.0 FL    MCH 27.6 26.0 - 34.0 PG    MCHC 32.9 30.0 - 36.5 g/dL    RDW 15.1 (H) 11.5 - 14.5 %    PLATELET 356 900 - 099 K/uL    MPV 10.9 8.9 - 12.9 FL    NEUTROPHILS 86 (H) 32 - 75 %    LYMPHOCYTES 4 (L) 12 - 49 %    MONOCYTES 10 5 - 13 %    EOSINOPHILS 0 0 - 7 %    BASOPHILS 0 0 - 1 %    IMMATURE GRANULOCYTES 0 0.0 - 0.5 %    ABS. NEUTROPHILS 11.4 (H) 1.8 - 8.0 K/UL    ABS.  LYMPHOCYTES 0.5 (L) 0.8 - 3.5 K/UL    ABS. MONOCYTES 1.3 (H) 0.0 - 1.0 K/UL    ABS. EOSINOPHILS 0.0 0.0 - 0.4 K/UL    ABS. BASOPHILS 0.0 0.0 - 0.1 K/UL    ABS. IMM. GRANS. 0.0 0.00 - 0.04 K/UL    DF AUTOMATED     METABOLIC PANEL, COMPREHENSIVE    Collection Time: 12/12/20  1:35 AM   Result Value Ref Range    Sodium 144 136 - 145 mmol/L    Potassium 3.2 (L) 3.5 - 5.1 mmol/L    Chloride 106 97 - 108 mmol/L    CO2 29 21 - 32 mmol/L    Anion gap 9 5 - 15 mmol/L    Glucose 113 (H) 65 - 100 mg/dL    BUN 60 (H) 6 - 20 mg/dL    Creatinine 1.63 (H) 0.55 - 1.02 mg/dL    BUN/Creatinine ratio 37 (H) 12 - 20      GFR est AA 36 (L) >60 ml/min/1.73m2    GFR est non-AA 29 (L) >60 ml/min/1.73m2    Calcium 9.1 8.5 - 10.1 mg/dL    Bilirubin, total 0.7 0.2 - 1.0 mg/dL    AST (SGOT) 18 15 - 37 U/L    ALT (SGPT) 14 12 - 78 U/L    Alk. phosphatase 57 45 - 117 U/L    Protein, total 6.6 6.4 - 8.2 g/dL    Albumin 3.1 (L) 3.5 - 5.0 g/dL    Globulin 3.5 2.0 - 4.0 g/dL    A-G Ratio 0.9 (L) 1.1 - 2.2     GLUCOSE, POC    Collection Time: 12/12/20  7:20 AM   Result Value Ref Range    Glucose (POC) 110 (H) 65 - 100 mg/dL    Performed by Pedro Pablo Hernandez        Assessment:     Active Problems:    Small bowel obstruction ([address] (12/11/2020)        Plan:   80year-old female with partial small bowel obstruction as well as constipation refusing nasogastric tube. Continue with n.p.o., IV fluids. We will try a subset enema. Abdominal x-rays in a.m. Patient's white blood cell count is elevated at 13,000. If exam worsens may require exploration.     Signed By: Ashley See MD     December 12, 2020

## 2020-12-13 ENCOUNTER — APPOINTMENT (OUTPATIENT)
Dept: GENERAL RADIOLOGY | Age: 85
DRG: 388 | End: 2020-12-13
Attending: COLON & RECTAL SURGERY
Payer: MEDICARE

## 2020-12-13 ENCOUNTER — APPOINTMENT (OUTPATIENT)
Dept: CT IMAGING | Age: 85
DRG: 388 | End: 2020-12-13
Attending: COLON & RECTAL SURGERY
Payer: MEDICARE

## 2020-12-13 LAB
ANION GAP SERPL CALC-SCNC: 13 MMOL/L (ref 5–15)
BASOPHILS # BLD: 0 K/UL (ref 0–0.1)
BASOPHILS NFR BLD: 0 % (ref 0–1)
BUN SERPL-MCNC: 58 MG/DL (ref 6–20)
BUN/CREAT SERPL: 49 (ref 12–20)
CA-I BLD-MCNC: 8.7 MG/DL (ref 8.5–10.1)
CHLORIDE SERPL-SCNC: 111 MMOL/L (ref 97–108)
CO2 SERPL-SCNC: 23 MMOL/L (ref 21–32)
CREAT SERPL-MCNC: 1.19 MG/DL (ref 0.55–1.02)
DIFFERENTIAL METHOD BLD: ABNORMAL
EOSINOPHIL # BLD: 0 K/UL (ref 0–0.4)
EOSINOPHIL NFR BLD: 0 % (ref 0–7)
ERYTHROCYTE [DISTWIDTH] IN BLOOD BY AUTOMATED COUNT: 15.1 % (ref 11.5–14.5)
GLUCOSE SERPL-MCNC: 58 MG/DL (ref 65–100)
HCT VFR BLD AUTO: 34.3 % (ref 35–47)
HGB BLD-MCNC: 10.8 G/DL (ref 11.5–16)
IMM GRANULOCYTES # BLD AUTO: 0 K/UL (ref 0–0.04)
IMM GRANULOCYTES NFR BLD AUTO: 0 % (ref 0–0.5)
LYMPHOCYTES # BLD: 0.5 K/UL (ref 0.8–3.5)
LYMPHOCYTES NFR BLD: 6 % (ref 12–49)
MCH RBC QN AUTO: 26.6 PG (ref 26–34)
MCHC RBC AUTO-ENTMCNC: 31.5 G/DL (ref 30–36.5)
MCV RBC AUTO: 84.5 FL (ref 80–99)
MONOCYTES # BLD: 0.5 K/UL (ref 0–1)
MONOCYTES NFR BLD: 7 % (ref 5–13)
NEUTS SEG # BLD: 6.7 K/UL (ref 1.8–8)
NEUTS SEG NFR BLD: 87 % (ref 32–75)
PLATELET # BLD AUTO: 346 K/UL (ref 150–400)
PMV BLD AUTO: 10.9 FL (ref 8.9–12.9)
POTASSIUM SERPL-SCNC: 3.3 MMOL/L (ref 3.5–5.1)
RBC # BLD AUTO: 4.06 M/UL (ref 3.8–5.2)
SARS-COV-2, COV2: NORMAL
SODIUM SERPL-SCNC: 147 MMOL/L (ref 136–145)
WBC # BLD AUTO: 7.8 K/UL (ref 3.6–11)

## 2020-12-13 PROCEDURE — 74018 RADEX ABDOMEN 1 VIEW: CPT

## 2020-12-13 PROCEDURE — 74011250636 HC RX REV CODE- 250/636: Performed by: COLON & RECTAL SURGERY

## 2020-12-13 PROCEDURE — 99232 SBSQ HOSP IP/OBS MODERATE 35: CPT | Performed by: COLON & RECTAL SURGERY

## 2020-12-13 PROCEDURE — 80048 BASIC METABOLIC PNL TOTAL CA: CPT

## 2020-12-13 PROCEDURE — 85025 COMPLETE CBC W/AUTO DIFF WBC: CPT

## 2020-12-13 PROCEDURE — 65270000029 HC RM PRIVATE

## 2020-12-13 PROCEDURE — 74011000250 HC RX REV CODE- 250: Performed by: NURSE PRACTITIONER

## 2020-12-13 PROCEDURE — 74011250636 HC RX REV CODE- 250/636: Performed by: NURSE PRACTITIONER

## 2020-12-13 PROCEDURE — 74176 CT ABD & PELVIS W/O CONTRAST: CPT

## 2020-12-13 PROCEDURE — 94762 N-INVAS EAR/PLS OXIMTRY CONT: CPT

## 2020-12-13 PROCEDURE — 0D9670Z DRAINAGE OF STOMACH WITH DRAINAGE DEVICE, VIA NATURAL OR ARTIFICIAL OPENING: ICD-10-PCS | Performed by: COLON & RECTAL SURGERY

## 2020-12-13 RX ORDER — ACETAMINOPHEN 650 MG/1
650 SUPPOSITORY RECTAL
Status: DISCONTINUED | OUTPATIENT
Start: 2020-12-13 | End: 2020-12-30

## 2020-12-13 RX ORDER — METOPROLOL TARTRATE 5 MG/5ML
2.5 INJECTION INTRAVENOUS EVERY 4 HOURS
Status: DISCONTINUED | OUTPATIENT
Start: 2020-12-13 | End: 2020-12-14

## 2020-12-13 RX ADMIN — METOPROLOL TARTRATE 5 MG: 1 INJECTION, SOLUTION INTRAVENOUS at 00:53

## 2020-12-13 RX ADMIN — FAMOTIDINE 20 MG: 10 INJECTION INTRAVENOUS at 08:32

## 2020-12-13 RX ADMIN — METOPROLOL TARTRATE 2.5 MG: 5 INJECTION INTRAVENOUS at 20:13

## 2020-12-13 RX ADMIN — FAMOTIDINE 20 MG: 10 INJECTION INTRAVENOUS at 20:13

## 2020-12-13 RX ADMIN — METOPROLOL TARTRATE 2.5 MG: 5 INJECTION INTRAVENOUS at 16:16

## 2020-12-13 RX ADMIN — SODIUM CHLORIDE 125 ML/HR: 9 INJECTION, SOLUTION INTRAVENOUS at 12:31

## 2020-12-13 RX ADMIN — SODIUM CHLORIDE 125 ML/HR: 9 INJECTION, SOLUTION INTRAVENOUS at 05:13

## 2020-12-13 RX ADMIN — SODIUM CHLORIDE 125 ML/HR: 9 INJECTION, SOLUTION INTRAVENOUS at 22:24

## 2020-12-13 RX ADMIN — METOPROLOL TARTRATE 2.5 MG: 5 INJECTION INTRAVENOUS at 12:32

## 2020-12-13 NOTE — PROGRESS NOTES
Progress Note    Patient: Kayla Esquivel MRN: [id number]  SSN: [id number]    YOB: 1927  Age: 80 y.o. Sex: female      Admit Date: 12/11/2020    LOS: 2 days     Subjective:   Hospital day 3 for partial small bowel obstruction  Patient seen in bed not very communicative secondary to underlying cognitive dysfunction  Had 1 bowel movement with soapsuds enema    Objective:     Vitals:    12/12/20 2028 12/13/20 0050 12/13/20 0830 12/13/20 1232   BP: (!) 175/72 (!) 163/74 (!) 162/74 (!) 161/73   Pulse: 89 89 85 89   Resp: 18 18 20    Temp: 97.1 °F (36.2 °C) 97.5 °F (36.4 °C) 98.4 °F (36.9 °C)    SpO2: 100% 98% 98%    Weight:       Height:            Intake and Output:  Current Shift: No intake/output data recorded. Last three shifts: No intake/output data recorded. Review of Systems:  ROS     Physical Exam:   Abdomen is soft, distended, tender palpation, no peritoneal findings    Lab/Data Review:  Recent Results (from the past 24 hour(s))   SARS-COV-2    Collection Time: 12/12/20  4:00 PM   Result Value Ref Range    SARS-CoV-2 Please find results under separate order     CBC WITH AUTOMATED DIFF    Collection Time: 12/13/20  7:26 AM   Result Value Ref Range    WBC 7.8 3.6 - 11.0 K/uL    RBC 4.06 3.80 - 5.20 M/uL    HGB 10.8 (L) 11.5 - 16.0 g/dL    HCT 34.3 (L) 35.0 - 47.0 %    MCV 84.5 80.0 - 99.0 FL    MCH 26.6 26.0 - 34.0 PG    MCHC 31.5 30.0 - 36.5 g/dL    RDW 15.1 (H) 11.5 - 14.5 %    PLATELET 557 222 - 688 K/uL    MPV 10.9 8.9 - 12.9 FL    NEUTROPHILS 87 (H) 32 - 75 %    LYMPHOCYTES 6 (L) 12 - 49 %    MONOCYTES 7 5 - 13 %    EOSINOPHILS 0 0 - 7 %    BASOPHILS 0 0 - 1 %    IMMATURE GRANULOCYTES 0 0.0 - 0.5 %    ABS. NEUTROPHILS 6.7 1.8 - 8.0 K/UL    ABS. LYMPHOCYTES 0.5 (L) 0.8 - 3.5 K/UL    ABS. MONOCYTES 0.5 0.0 - 1.0 K/UL    ABS. EOSINOPHILS 0.0 0.0 - 0.4 K/UL    ABS. BASOPHILS 0.0 0.0 - 0.1 K/UL    ABS. IMM.  GRANS. 0.0 0.00 - 0.04 K/UL    DF AUTOMATED     METABOLIC PANEL, BASIC Collection Time: 12/13/20  7:26 AM   Result Value Ref Range    Sodium 147 (H) 136 - 145 mmol/L    Potassium 3.3 (L) 3.5 - 5.1 mmol/L    Chloride 111 (H) 97 - 108 mmol/L    CO2 23 21 - 32 mmol/L    Anion gap 13 5 - 15 mmol/L    Glucose 58 (L) 65 - 100 mg/dL    BUN 58 (H) 6 - 20 mg/dL    Creatinine 1.19 (H) 0.55 - 1.02 mg/dL    BUN/Creatinine ratio 49 (H) 12 - 20      GFR est AA 51 (L) >60 ml/min/1.73m2    GFR est non-AA 42 (L) >60 ml/min/1.73m2    Calcium 8.7 8.5 - 10.1 mg/dL      X-ray abdomen December 13, 2020: Abdomen, 2 views     Air-filled distended stomach. In the left mid abdomen there are air and  fluid-filled dilated small bowel loops in keeping with clinical concern small  bowel obstruction. Small volume stool and air through colon. On submitted two-view study there is no free intraperitoneal air. Advanced degenerative change lumbar spine. Left pelvic hardware. Assessment:     Active Problems:    Small bowel obstruction ([address] (12/11/2020)        Plan:   Unable to place NG tube as patient repeatedly pulls it up  If no improvement on examination tomorrow we will plan for exploratory laparotomy lysis of adhesions; will discuss with patient's daughter in a.m.   Continue n.p.o. IV fluids    Signed By: Tadeo Dominguez MD     December 13, 2020

## 2020-12-13 NOTE — PROGRESS NOTES
Problem: Pressure Injury - Risk of  Goal: *Prevention of pressure injury  Description: Document Bubba Scale and appropriate interventions in the flowsheet. 12/13/2020 0948 by Osiel Auguste RN  Outcome: Progressing Towards Goal  Note: Pressure Injury Interventions:  Sensory Interventions: Assess changes in LOC, Keep linens dry and wrinkle-free, Turn and reposition approx. every two hours (pillows and wedges if needed)    Moisture Interventions: Absorbent underpads, Apply protective barrier, creams and emollients, Check for incontinence Q2 hours and as needed, Internal/External urinary devices    Activity Interventions: PT/OT evaluation    Mobility Interventions: HOB 30 degrees or less, Turn and reposition approx. every two hours(pillow and wedges)    Nutrition Interventions: Document food/fluid/supplement intake, Offer support with meals,snacks and hydration    Friction and Shear Interventions: Apply protective barrier, creams and emollients, HOB 30 degrees or less             12/13/2020 0947 by Osiel Auguste RN  Outcome: Progressing Towards Goal  Note: Pressure Injury Interventions:  Sensory Interventions: Assess changes in LOC, Keep linens dry and wrinkle-free, Turn and reposition approx. every two hours (pillows and wedges if needed)    Moisture Interventions: Absorbent underpads, Apply protective barrier, creams and emollients, Check for incontinence Q2 hours and as needed, Internal/External urinary devices    Activity Interventions: PT/OT evaluation    Mobility Interventions: HOB 30 degrees or less, Turn and reposition approx.  every two hours(pillow and wedges)    Nutrition Interventions: Document food/fluid/supplement intake, Offer support with meals,snacks and hydration    Friction and Shear Interventions: Apply protective barrier, creams and emollients, HOB 30 degrees or less                Problem: Patient Education: Go to Patient Education Activity  Goal: Patient/Family Education  12/13/2020 0948 by Padmini Avila RN  Outcome: Progressing Towards Goal  12/13/2020 0947 by Padmini Avila RN  Outcome: Progressing Towards Goal     Problem: Pain  Goal: *Control of Pain  12/13/2020 0948 by Padmini Avila RN  Outcome: Progressing Towards Goal  12/13/2020 0947 by Padmini Avila RN  Outcome: Progressing Towards Goal  Goal: *PALLIATIVE CARE:  Alleviation of Pain  12/13/2020 0948 by Padmini Avila RN  Outcome: Progressing Towards Goal  12/13/2020 0947 by Padmini Avila RN  Outcome: Progressing Towards Goal     Problem: Patient Education: Go to Patient Education Activity  Goal: Patient/Family Education  12/13/2020 0948 by Padmini Avila RN  Outcome: Progressing Towards Goal  12/13/2020 0947 by Padmini Avila RN  Outcome: Progressing Towards Goal     Problem: Falls - Risk of  Goal: *Absence of Falls  Description: Document Kenton Dash Fall Risk and appropriate interventions in the flowsheet.   12/13/2020 0948 by Padmini Avila RN  Outcome: Progressing Towards Goal  Note: Fall Risk Interventions:  Mobility Interventions: Bed/chair exit alarm    Mentation Interventions: Adequate sleep, hydration, pain control, Bed/chair exit alarm    Medication Interventions: Bed/chair exit alarm    Elimination Interventions: Bed/chair exit alarm, Call light in reach, Toileting schedule/hourly rounds           12/13/2020 0947 by Padmini Avila RN  Outcome: Progressing Towards Goal  Note: Fall Risk Interventions:  Mobility Interventions: Bed/chair exit alarm    Mentation Interventions: Adequate sleep, hydration, pain control, Bed/chair exit alarm    Medication Interventions: Bed/chair exit alarm    Elimination Interventions: Bed/chair exit alarm, Call light in reach, Toileting schedule/hourly rounds              Problem: Patient Education: Go to Patient Education Activity  Goal: Patient/Family Education  12/13/2020 0948 by Padmini Avila RN  Outcome: Progressing Towards Goal  12/13/2020 0947 by Boogie Gale RN  Outcome: Progressing Towards Goal

## 2020-12-13 NOTE — PROGRESS NOTES
Soap suds enema administered to patient at 0500, approximately 1,000 mL of enema soap + water solution instilled. Results included colored water and medium amount of brown, soft, formed stool. Patient tolerated well.

## 2020-12-14 LAB
BACTERIA SPEC CULT: ABNORMAL
SARS-COV-2, COV2NT: NOT DETECTED
SPECIAL REQUESTS,SREQ: ABNORMAL

## 2020-12-14 PROCEDURE — 74011000250 HC RX REV CODE- 250: Performed by: COLON & RECTAL SURGERY

## 2020-12-14 PROCEDURE — 65270000029 HC RM PRIVATE

## 2020-12-14 PROCEDURE — 99232 SBSQ HOSP IP/OBS MODERATE 35: CPT | Performed by: COLON & RECTAL SURGERY

## 2020-12-14 PROCEDURE — 74011000250 HC RX REV CODE- 250: Performed by: NURSE PRACTITIONER

## 2020-12-14 PROCEDURE — 74011250636 HC RX REV CODE- 250/636: Performed by: COLON & RECTAL SURGERY

## 2020-12-14 RX ORDER — METOPROLOL TARTRATE 5 MG/5ML
5 INJECTION INTRAVENOUS EVERY 4 HOURS
Status: DISCONTINUED | OUTPATIENT
Start: 2020-12-14 | End: 2020-12-17

## 2020-12-14 RX ADMIN — METOPROLOL TARTRATE 5 MG: 5 INJECTION INTRAVENOUS at 22:03

## 2020-12-14 RX ADMIN — METOPROLOL TARTRATE 5 MG: 5 INJECTION INTRAVENOUS at 12:42

## 2020-12-14 RX ADMIN — METOPROLOL TARTRATE 5 MG: 5 INJECTION INTRAVENOUS at 16:22

## 2020-12-14 RX ADMIN — FAMOTIDINE 20 MG: 10 INJECTION INTRAVENOUS at 09:01

## 2020-12-14 RX ADMIN — FAMOTIDINE 20 MG: 10 INJECTION INTRAVENOUS at 22:03

## 2020-12-14 RX ADMIN — METOPROLOL TARTRATE 2.5 MG: 5 INJECTION INTRAVENOUS at 00:01

## 2020-12-14 RX ADMIN — METOPROLOL TARTRATE 2.5 MG: 5 INJECTION INTRAVENOUS at 09:00

## 2020-12-14 RX ADMIN — METOPROLOL TARTRATE 2.5 MG: 5 INJECTION INTRAVENOUS at 04:38

## 2020-12-14 NOTE — PROGRESS NOTES
Bedside and Verbal shift change report given to Bea Jon RN (oncoming nurse) by Elio Cabral RN (offgoing nurse). Report included the following information SBAR and MAR.

## 2020-12-14 NOTE — PROGRESS NOTES
Soap suds enema administered to patient at 0411, approximately 1,000 mL of enema soap + water solution instilled. Results included colored water and minimal specks of stool. Patient tolerated well.

## 2020-12-14 NOTE — CONSULTS
Consult Hospitalist History and Physical    Patient: Kylie Lo MRN: [id number]  SSN: [id number]    YOB: 1927  Age: 80 y.o. Sex: female          Subjective:   Reason for Consult: Medical Management    Reason for admission:   Kylie Lo is a 80 y.o. female who admitted 12/11/2020. Past medical history significant for hypertension and previous abdominal surgeries, otherwise unknown. She comes into the emergency room for complaints of nausea, vomiting and acute abdominal pain. Symptoms started a few days ago and have not improved. Poor historian, questionable dementia. CT of abdomen and pelvis reveals air-filled stomach, distended ileum, distal bowel high-grade obstruction, Extensive diverticulosis no diverticulitis and large volume stool within the rectum. WBC count 13.2, potassium 3.2, BUN 60, creatinine 1.63. Blood pressures systolic 345J to 363N over 90s. Patient was admitted by general surgery for further management of small bowel obstruction, acute constipation, nausea, vomiting and abdominal pain. Internal medicine consulted for medical management of blood pressures. Subjective: Altered mental status not participating. Continuing to pull NG tube out despite restraints. Renewed restraint order  Continues to be hypertensive despite Lopressor. She continues to have fevers, this morning 101.8. Past Medical History:   Diagnosis Date    Hypertension      History reviewed. No pertinent surgical history. History reviewed. No pertinent family history.   Social History     Tobacco Use    Smoking status: Never Smoker    Smokeless tobacco: Never Used   Substance Use Topics    Alcohol use: Not Currently     Frequency: Never      Prior to Admission medications    Not on File        No Known Allergies    Review of Systems:  Unreliable due to altered mental status, metabolic encephalopathy, questionable dementia    Objective:     Vitals:    12/13/20 1936 12/13/20 2000 12/14/20 0001 12/14/20 0411   BP: (!) 160/78  (!) 148/71 (!) 149/72   Pulse: 94  83 85   Resp: 18  18    Temp: 99.6 °F (37.6 °C)  97.8 °F (36.6 °C)    SpO2: 98% 98% 99%    Weight:       Height:            Physical Exam:  General: Chronically ill, lethargic, cooperative, no distress  Eye: conjunctivae/corneas clear. PERRL, EOM's intact. Throat and Neck: normal and no erythema or exudates noted. No mass   Lung: clear to auscultation bilaterally  Heart: regular rate and rhythm,   Abdomen: soft, ++++-tender. Bowel sounds abnormal. No masses,  Extremities:  able to move all extremities normal, atraumatic  Skin: Normal.  Neurologic: Confused. Profoundly weak  Psychiatric: non focal    No results found for this or any previous visit (from the past 24 hour(s)). XR ABD (KUB)   Final Result   Findings/impression: Persistent dilatation of small bowel consistent distal   small bowel obstruction. Enteric tube remains looped within the distal   esophagus. Advanced degenerative changes of the spine and left pelvic hardware   again noted. CT ABD PELV WO CONT   Final Result   IMPRESSION: High-grade mechanical small bowel obstruction, etiology   indeterminate, possibly adhesions but internal hernia is not excluded. Some   bowel contacts the right femoral vein, possibly from over i.e. panniculitis but   correlate with any clinical suspicion of femoral hernia. Gastric tube   incompletely visualized, coiled within the esophagus. Results called to [facility] on 12/13/2020 5:40 PM.         XR ABD (KUB)   Final Result      CT ABD PELV WO CONT   Final Result   Impression:   1. High-grade small bowel obstruction with a transition point in the mid   abdomen involving the distal small bowel. No evidence of perforation. Small   amount of ascites. 2.  Small bilateral pleural effusions. 3.  Large volume of stool within the rectum consistent with constipation.    4.  Multiple bilateral hyperattenuating renal lesions, likely consistent with   proteinaceous or hemorrhagic cysts. If indicated, consider follow-up CT in 6   months. 5.  Coronary artery disease and atherosclerotic disease of the aorta. XR ABD ACUTE W 1 V CHEST   Final Result   Impression:   1. No acute cardiopulmonary process. Hyperlucent lungs could indicate COPD   changes. 2.  Dilated loop of bowel in the midabdomen, possibly small bowel. Recommend CT   of the abdomen and pelvis for further evaluation. Hospital Problems  Never Reviewed          Codes Class Noted POA    Small bowel obstruction ([facility] [address] ICD-10-CM: T48.988  ICD-9-CM: 560.9  2020 Unknown              Assessment/Plan:          1. Small bowel obstruction:  2. Constipation, stool impact meant:  3. Nausea and vomitin. Abdominal pain:  · CT of pelvis reveals distended ileum, distal bowel high-grade obstruction, diverticulosis without diverticulitis and large volume stool in rectum  · IV fluids normal saline 125 an hour  · NG tube to decompress, intermittent suction with large output  · Suggest soapsuds enemas as needed until stool rectum impaction cleared  · IV PPI, IV antiemetics    5. Dehydration:  4. Acute kidney injury: Secondary to poor oral intake, SBO, N/P and dehydration  · IV fluids normal saline 125 an hour  · Monitor labs closely replace electrolytes  · Bladder scan if needed, insert Javed if needed    5. Hypertension:  · Lopressor 5 mg every 6 hours as needed SBP greater than 160    6. Profound weakness:  7. Failure to thrive:  · When acute symptoms resolved would recommend PT OT and case management for disposition needs    8. Altered mental status/metabolic encephalopathy/?  Dementia:  · Interfering with medical therapy i.e. pulled NG tube out x2  · Ordered wrist restraints  · reinsertion of NG tube      SCDs for DVT      Thank you for the consult and for allowing us to participate in the care of this patient.    Please do not hesitate to call with any questions or concerns. I will continue to follow along during this admission.     Time spent:       Signed By: Geni Shearer NP     December 14, 2020

## 2020-12-14 NOTE — CONSULTS
Consult Hospitalist History and Physical    Patient: Brandan Velazquez MRN: [id number]  SSN: [id number]    YOB: 1927  Age: 80 y.o. Sex: female          Subjective:   Reason for Consult: Medical Management    Reason for admission:   Brandan Velazquez is a 80 y.o. female who admitted 12/11/2020. Past medical history significant for hypertension and previous abdominal surgeries, otherwise unknown. She comes into the emergency room for complaints of nausea, vomiting and acute abdominal pain. Symptoms started a few days ago and have not improved. Poor historian, questionable dementia. CT of abdomen and pelvis reveals air-filled stomach, distended ileum, distal bowel high-grade obstruction, Extensive diverticulosis no diverticulitis and large volume stool within the rectum. WBC count 13.2, potassium 3.2, BUN 60, creatinine 1.63. Blood pressures systolic 517Z to 256V over 90s. Patient was admitted by general surgery for further management of small bowel obstruction, acute constipation, nausea, vomiting and abdominal pain. Internal medicine consulted for medical management of blood pressures. Subjective response: Altered mental status not participating    Past Medical History:   Diagnosis Date    Hypertension      History reviewed. No pertinent surgical history. History reviewed. No pertinent family history.   Social History     Tobacco Use    Smoking status: Never Smoker    Smokeless tobacco: Never Used   Substance Use Topics    Alcohol use: Not Currently     Frequency: Never      Prior to Admission medications    Not on File        No Known Allergies    Review of Systems:  Unreliable due to altered mental status, metabolic encephalopathy, questionable dementia    Objective:     Vitals:    12/13/20 1936 12/13/20 2000 12/14/20 0001 12/14/20 0411   BP: (!) 160/78  (!) 148/71 (!) 149/72   Pulse: 94  83 85   Resp: 18  18    Temp: 99.6 °F (37.6 °C)  97.8 °F (36.6 °C)    SpO2: 98% 98% 99% Weight:       Height:            Physical Exam:  General: Chronically ill, lethargic, cooperative, no distress  Eye: conjunctivae/corneas clear. PERRL, EOM's intact. Throat and Neck: normal and no erythema or exudates noted. No mass   Lung: clear to auscultation bilaterally  Heart: regular rate and rhythm,   Abdomen: soft, ++++-tender. Bowel sounds abnormal. No masses,  Extremities:  able to move all extremities normal, atraumatic  Skin: Normal.  Neurologic: Confused. Profoundly weak  Psychiatric: non focal    No results found for this or any previous visit (from the past 24 hour(s)). XR ABD (KUB)   Final Result   Findings/impression: Persistent dilatation of small bowel consistent distal   small bowel obstruction. Enteric tube remains looped within the distal   esophagus. Advanced degenerative changes of the spine and left pelvic hardware   again noted. CT ABD PELV WO CONT   Final Result   IMPRESSION: High-grade mechanical small bowel obstruction, etiology   indeterminate, possibly adhesions but internal hernia is not excluded. Some   bowel contacts the right femoral vein, possibly from over i.e. panniculitis but   correlate with any clinical suspicion of femoral hernia. Gastric tube   incompletely visualized, coiled within the esophagus. Results called to [facility] on 12/13/2020 5:40 PM.         XR ABD (KUB)   Final Result      CT ABD PELV WO CONT   Final Result   Impression:   1. High-grade small bowel obstruction with a transition point in the mid   abdomen involving the distal small bowel. No evidence of perforation. Small   amount of ascites. 2.  Small bilateral pleural effusions. 3.  Large volume of stool within the rectum consistent with constipation. 4.  Multiple bilateral hyperattenuating renal lesions, likely consistent with   proteinaceous or hemorrhagic cysts. If indicated, consider follow-up CT in 6   months.    5.  Coronary artery disease and atherosclerotic disease of the aorta. XR ABD ACUTE W 1 V CHEST   Final Result   Impression:   1. No acute cardiopulmonary process. Hyperlucent lungs could indicate COPD   changes. 2.  Dilated loop of bowel in the midabdomen, possibly small bowel. Recommend CT   of the abdomen and pelvis for further evaluation. Hospital Problems  Never Reviewed          Codes Class Noted POA    Small bowel obstruction ([facility] [address] ICD-10-CM: M66.229  ICD-9-CM: 560.9  2020 Unknown              Assessment/Plan:          1. Small bowel obstruction:  2. Constipation, stool impact meant:  3. Nausea and vomitin. Abdominal pain:  · CT of pelvis reveals distended ileum, distal bowel high-grade obstruction, diverticulosis without diverticulitis and large volume stool in rectum  · IV fluids normal saline 125 an hour  · NG tube to decompress  · Suggest soapsuds enemas as needed until stool rectum impaction cleared  · IV PPI, IV antiemetics    5. Dehydration:  4. Acute kidney injury: Secondary to poor oral intake, SBO, N/P and dehydration  · IV fluids normal saline 125 an hour  · Monitor labs closely replace electrolytes  · Check UA  · Bladder scan if needed, insert Javed if needed    5. Hypertension:  · Lopressor 5 mg every 6 hours as needed SBP greater than 160    6. Profound weakness:  7. Failure to thrive:    · When acute symptoms resolved would recommend PT OT and case management for disposition needs    8. Altered mental status/metabolic encephalopathy/?  Dementia:  · Interfering with medical therapy i.e. pulled NG tube out x2  · Ordered wrist restraints  · Ordered reinsertion of NG tube      SCDs for DVT      Thank you for the consult and for allowing us to participate in the care of this patient. Please do not hesitate to call with any questions or concerns. I will continue to follow along during this admission.     Time spent:       Signed By: Lady Rema NP     2020

## 2020-12-14 NOTE — PROGRESS NOTES
KUB results stated that NG tube was coiled in the distal part of the esophagus after patient had previously pulled it out and had to be re-inserted. NG tube was again replaced and advanced further this time. Placement was able to be verified by auscultation and suction and continues to be hooked up to intermittent suction.

## 2020-12-14 NOTE — PROGRESS NOTES
Progress Note    Patient: Christy Reed MRN: [id number]  SSN: [id number]    YOB: 1927  Age: 80 y.o. Sex: female      Admit Date: 12/11/2020    LOS: 3 days     Subjective:   Hospital day 4 for small bowel obstruction  Patient NG tube inserted yesterday with passing 1 L out however then pulled it out. She had reinserted NG prior to CT scan which showed NG coiled in the stomach. Is attempted to be reinserted and was reinserted this morning with 500 mL output. Objective:     Vitals:    12/13/20 1936 12/13/20 2000 12/14/20 0001 12/14/20 0411   BP: (!) 160/78  (!) 148/71 (!) 149/72   Pulse: 94  83 85   Resp: 18  18    Temp: 99.6 °F (37.6 °C)  97.8 °F (36.6 °C)    SpO2: 98% 98% 99%    Weight:       Height:            Intake and Output:  Current Shift: No intake/output data recorded. Last three shifts: 12/12 0701 - 12/13 1900  In: -   Out: 1     Review of Systems:  ROS     Physical Exam:   Abdomen is soft, distended, tender although improved from yesterday, no peritoneal findings    Lab/Data Review:  Recent Results (from the past 24 hour(s))   CBC WITH AUTOMATED DIFF    Collection Time: 12/13/20  7:26 AM   Result Value Ref Range    WBC 7.8 3.6 - 11.0 K/uL    RBC 4.06 3.80 - 5.20 M/uL    HGB 10.8 (L) 11.5 - 16.0 g/dL    HCT 34.3 (L) 35.0 - 47.0 %    MCV 84.5 80.0 - 99.0 FL    MCH 26.6 26.0 - 34.0 PG    MCHC 31.5 30.0 - 36.5 g/dL    RDW 15.1 (H) 11.5 - 14.5 %    PLATELET 199 416 - 722 K/uL    MPV 10.9 8.9 - 12.9 FL    NEUTROPHILS 87 (H) 32 - 75 %    LYMPHOCYTES 6 (L) 12 - 49 %    MONOCYTES 7 5 - 13 %    EOSINOPHILS 0 0 - 7 %    BASOPHILS 0 0 - 1 %    IMMATURE GRANULOCYTES 0 0.0 - 0.5 %    ABS. NEUTROPHILS 6.7 1.8 - 8.0 K/UL    ABS. LYMPHOCYTES 0.5 (L) 0.8 - 3.5 K/UL    ABS. MONOCYTES 0.5 0.0 - 1.0 K/UL    ABS. EOSINOPHILS 0.0 0.0 - 0.4 K/UL    ABS. BASOPHILS 0.0 0.0 - 0.1 K/UL    ABS. IMM.  GRANS. 0.0 0.00 - 0.04 K/UL    DF AUTOMATED     METABOLIC PANEL, BASIC    Collection Time: 12/13/20 7:26 AM   Result Value Ref Range    Sodium 147 (H) 136 - 145 mmol/L    Potassium 3.3 (L) 3.5 - 5.1 mmol/L    Chloride 111 (H) 97 - 108 mmol/L    CO2 23 21 - 32 mmol/L    Anion gap 13 5 - 15 mmol/L    Glucose 58 (L) 65 - 100 mg/dL    BUN 58 (H) 6 - 20 mg/dL    Creatinine 1.19 (H) 0.55 - 1.02 mg/dL    BUN/Creatinine ratio 49 (H) 12 - 20      GFR est AA 51 (L) >60 ml/min/1.73m2    GFR est non-AA 42 (L) >60 ml/min/1.73m2    Calcium 8.7 8.5 - 10.1 mg/dL      CT scan abdomen pelvis 12/14/2020  IMPRESSION  IMPRESSION: High-grade mechanical small bowel obstruction, etiology  indeterminate, possibly adhesions but internal hernia is not excluded. Some  bowel contacts the right femoral vein, possibly from over i.e. panniculitis but  correlate with any clinical suspicion of femoral hernia. Gastric tube  incompletely visualized, coiled within the esophagus. Assessment:     Active Problems:    Small bowel obstruction (HCC) (12/11/2020)        Plan:   Continue with nasogastric decompression  IV fluids  CBC/CMP this a.m.   We will contact the patient's daughter to discuss possible surgery    Signed By: Daphnie Mclain MD     December 14, 2020

## 2020-12-15 LAB
ALBUMIN SERPL-MCNC: 2.4 G/DL (ref 3.5–5)
ALBUMIN/GLOB SERPL: 0.7 {RATIO} (ref 1.1–2.2)
ALP SERPL-CCNC: 61 U/L (ref 45–117)
ALT SERPL-CCNC: 13 U/L (ref 12–78)
ANION GAP SERPL CALC-SCNC: 16 MMOL/L (ref 5–15)
AST SERPL W P-5'-P-CCNC: 17 U/L (ref 15–37)
BACTERIA SPEC CULT: NORMAL
BASOPHILS # BLD: 0 K/UL (ref 0–0.1)
BASOPHILS NFR BLD: 0 % (ref 0–1)
BILIRUB SERPL-MCNC: 0.4 MG/DL (ref 0.2–1)
BUN SERPL-MCNC: 26 MG/DL (ref 6–20)
BUN/CREAT SERPL: 33 (ref 12–20)
CA-I BLD-MCNC: 8.6 MG/DL (ref 8.5–10.1)
CHLORIDE SERPL-SCNC: 120 MMOL/L (ref 97–108)
CO2 SERPL-SCNC: 16 MMOL/L (ref 21–32)
CREAT SERPL-MCNC: 0.8 MG/DL (ref 0.55–1.02)
DIFFERENTIAL METHOD BLD: ABNORMAL
EOSINOPHIL # BLD: 0.1 K/UL (ref 0–0.4)
EOSINOPHIL NFR BLD: 1 % (ref 0–7)
ERYTHROCYTE [DISTWIDTH] IN BLOOD BY AUTOMATED COUNT: 15.1 % (ref 11.5–14.5)
GLOBULIN SER CALC-MCNC: 3.4 G/DL (ref 2–4)
GLUCOSE SERPL-MCNC: 39 MG/DL (ref 65–100)
HCT VFR BLD AUTO: 35 % (ref 35–47)
HGB BLD-MCNC: 11 G/DL (ref 11.5–16)
IMM GRANULOCYTES # BLD AUTO: 0.1 K/UL (ref 0–0.04)
IMM GRANULOCYTES NFR BLD AUTO: 2 % (ref 0–0.5)
LYMPHOCYTES # BLD: 0.6 K/UL (ref 0.8–3.5)
LYMPHOCYTES NFR BLD: 11 % (ref 12–49)
MCH RBC QN AUTO: 27.1 PG (ref 26–34)
MCHC RBC AUTO-ENTMCNC: 31.4 G/DL (ref 30–36.5)
MCV RBC AUTO: 86.2 FL (ref 80–99)
MONOCYTES # BLD: 0.4 K/UL (ref 0–1)
MONOCYTES NFR BLD: 8 % (ref 5–13)
NEUTS SEG # BLD: 4.1 K/UL (ref 1.8–8)
NEUTS SEG NFR BLD: 78 % (ref 32–75)
PLATELET # BLD AUTO: 333 K/UL (ref 150–400)
PMV BLD AUTO: 11.7 FL (ref 8.9–12.9)
POTASSIUM SERPL-SCNC: 3 MMOL/L (ref 3.5–5.1)
PROT SERPL-MCNC: 5.8 G/DL (ref 6.4–8.2)
RBC # BLD AUTO: 4.06 M/UL (ref 3.8–5.2)
SODIUM SERPL-SCNC: 152 MMOL/L (ref 136–145)
SPECIAL REQUESTS,SREQ: NORMAL
WBC # BLD AUTO: 5.2 K/UL (ref 3.6–11)

## 2020-12-15 PROCEDURE — 74011000250 HC RX REV CODE- 250: Performed by: COLON & RECTAL SURGERY

## 2020-12-15 PROCEDURE — 99232 SBSQ HOSP IP/OBS MODERATE 35: CPT | Performed by: COLON & RECTAL SURGERY

## 2020-12-15 PROCEDURE — 85025 COMPLETE CBC W/AUTO DIFF WBC: CPT

## 2020-12-15 PROCEDURE — 74011250636 HC RX REV CODE- 250/636: Performed by: COLON & RECTAL SURGERY

## 2020-12-15 PROCEDURE — 74011000258 HC RX REV CODE- 258: Performed by: COLON & RECTAL SURGERY

## 2020-12-15 PROCEDURE — 36415 COLL VENOUS BLD VENIPUNCTURE: CPT

## 2020-12-15 PROCEDURE — 74011000250 HC RX REV CODE- 250: Performed by: NURSE PRACTITIONER

## 2020-12-15 PROCEDURE — 80053 COMPREHEN METABOLIC PANEL: CPT

## 2020-12-15 PROCEDURE — 65270000029 HC RM PRIVATE

## 2020-12-15 PROCEDURE — 74011000258 HC RX REV CODE- 258: Performed by: NURSE PRACTITIONER

## 2020-12-15 PROCEDURE — 74011250637 HC RX REV CODE- 250/637: Performed by: NURSE PRACTITIONER

## 2020-12-15 RX ORDER — SODIUM CHLORIDE 450 MG/100ML
125 INJECTION, SOLUTION INTRAVENOUS CONTINUOUS
Status: DISCONTINUED | OUTPATIENT
Start: 2020-12-15 | End: 2020-12-19

## 2020-12-15 RX ADMIN — METOPROLOL TARTRATE 5 MG: 5 INJECTION INTRAVENOUS at 14:54

## 2020-12-15 RX ADMIN — SODIUM CHLORIDE 125 ML/HR: 4.5 INJECTION, SOLUTION INTRAVENOUS at 17:45

## 2020-12-15 RX ADMIN — METOPROLOL TARTRATE 5 MG: 5 INJECTION INTRAVENOUS at 04:59

## 2020-12-15 RX ADMIN — ASCORBIC ACID, VITAMIN A PALMITATE, CHOLECALCIFEROL, THIAMINE HYDROCHLORIDE, RIBOFLAVIN-5 PHOSPHATE SODIUM, PYRIDOXINE HYDROCHLORIDE, NIACINAMIDE, DEXPANTHENOL, ALPHA-TOCOPHEROL ACETATE, VITAMIN K1, FOLIC ACID, BIOTIN, CYANOCOBALAMIN: 200; 3300; 200; 6; 3.6; 6; 40; 15; 10; 150; 600; 60; 5 INJECTION, SOLUTION INTRAVENOUS at 21:38

## 2020-12-15 RX ADMIN — FAMOTIDINE 20 MG: 10 INJECTION INTRAVENOUS at 10:31

## 2020-12-15 RX ADMIN — FAMOTIDINE 20 MG: 10 INJECTION INTRAVENOUS at 21:37

## 2020-12-15 RX ADMIN — METOPROLOL TARTRATE 5 MG: 5 INJECTION INTRAVENOUS at 10:31

## 2020-12-15 RX ADMIN — METOPROLOL TARTRATE 5 MG: 5 INJECTION INTRAVENOUS at 21:37

## 2020-12-15 RX ADMIN — I.V. FAT EMULSION 250 ML: 20 EMULSION INTRAVENOUS at 22:13

## 2020-12-15 RX ADMIN — NITROGLYCERIN 1 INCH: 20 OINTMENT TOPICAL at 21:37

## 2020-12-15 RX ADMIN — METOPROLOL TARTRATE 5 MG: 5 INJECTION INTRAVENOUS at 17:40

## 2020-12-15 RX ADMIN — NITROGLYCERIN 1 INCH: 20 OINTMENT TOPICAL at 17:39

## 2020-12-15 NOTE — PROGRESS NOTES
Reason for Admission:   Small Bowel Obstruction                   RUR Score:   11%                  Plan for utilizing home health:   Patient has personal care services. Unknown company. Ashley Atkinson is aware of the difficulty with finding a [employer] [facility] [place] for the patient. CM inquired about the patient going to rehab. Ashley doesn't think she will do well at rehab. Have tried it before and she did worse. Wants HH at home. PCP: First and Last name:  Unknown     Name of Practice: [facility]     Are you a current patient: Yes/No: Yes     Approximate date of last visit: Unknown     Can you participate in a virtual visit with your PCP:                     Current Advanced Directive/Advance Care Plan: Patient has no POA, LW or AD Niece is her only relative. Niece was asked resuscitation questions. CM explained the full code resuscitation and she wants the patient to be a DNR. She states \" I don't believe that she would want that\"  She is 80, that's a lot for her to go through. Transition of Care Plan:                      Patient lives at home in [place] alone. She has a personal care aid that comes twice a day everyday. She uses a walker at home but has no other DME. She is able to get around with the walker. Patient does have Dementia and she supposedly had a stroke a while ago. Patient does have a stepson. Vinh Loges. CM was unable to get number. Patient's address is  [address] [place], [id number]. CM to send referrals for [facility].

## 2020-12-15 NOTE — PROGRESS NOTES
Progress Note    Patient: Gianna Jolly MRN: [id number]  SSN: [id number]    YOB: 1927  Age: 80 y.o. Sex: female      Admit Date: 12/11/2020    LOS: 4 days     Subjective:   Hospital day 5 for partial small bowel obstruction  Patient continues have NG tube in place  700 mL recorded over dayshift yesterday  NG tube output is clearer and markedly less feculent  Objective:     Vitals:    12/14/20 2025 12/15/20 0230 12/15/20 0457 12/15/20 0827   BP: (!) 165/74 (!) 155/70 (!) 167/73 (!) 167/72   Pulse: 74   68   Resp: 18   18   Temp: 97.8 °F (36.6 °C)   98.2 °F (36.8 °C)   SpO2: 98%   99%   Weight:       Height:            Intake and Output:  Current Shift: 12/15 0701 - 12/15 1900  In: -   Out: 1 [Urine:1]  Last three shifts: 12/13 1901 - 12/15 0700  In: -   Out: 700     Review of Systems:  ROS     Physical Exam:   Physical Exam  Abdominal:      Comments: Abdomen is soft, nontender, nondistended          Lab/Data Review:  No results found for this or any previous visit (from the past 24 hour(s)).    A.m. labs are pending    Assessment:     Active Problems:    Small bowel obstruction ([address] (12/11/2020)        Plan:   Abdominal exam improved with NG tube decompression  Follow-up a.m. labs  Once NG tube output drop to below 200 mL every 8 hours we will plan on repeat CT with oral contrast via NG    Signed By: Ashley See MD     December 15, 2020

## 2020-12-15 NOTE — PROGRESS NOTES
Comprehensive Nutrition Assessment    Type and Reason for Visit: Initial, NPO/clear liquid    Nutrition Recommendations/Plan:     Continue Standard PPN advancing to goal of 60mL/hr, continuous  Continue 250mL 20% lipids daily  Providing 989kcal, 61g pro (Meeting ~80%kcal, 100% pro) - sufficient while awaiting PO diet advancement    Advance PO diet as soon as medically feasible  Monitor need for SLP eval   RD to add supplementation as diet advanced     Obtain weekly measured wts    Nutrition Assessment:  Admitted for abd pain, n/v. COVID negative yet remains on droplet precautions. KUB on admit finding dilated loops of bowel, possible bowel obstruction. NGT placed for decompression. GI surg following, rec'd conservative mgmt at this time. On  F/u KUB yesterday, noted SBO persistent. Per MD, NGT with 700mL OP, awaiting OP to decrease prior to d/c. Currently NPOx5. MD initiated PPN today at 40mL/hr +250mL 20% lipids. RD to update recs. Labs: (12/13) H/H: 10.8/34.2, Na 147, K 3.3, BN 58, Cr 1.19, BG 58. Meds: IVF, metoprolol, pepcid. Malnutrition Assessment:  Malnutrition Status:  Mild malnutrition(Possibly with higher degree of malnutrition)    Context:  Acute illness     Findings of the 6 clinical characteristics of malnutrition:   Energy Intake:  7 - 50% or less of est energy requirements for 5 or more days  Weight Loss:  Unable to assess     Body Fat Loss:  Unable to assess,     Muscle Mass Loss:  Unable to assess,    Fluid Accumulation:  Unable to assess,      Estimated Daily Nutrient Needs:  Energy (kcal): 1250 kcal (25kcal/kg); Weight Used for Energy Requirements: Current  Protein (g): 60g pro (1.2g/kg); Weight Used for Protein Requirements: Current  Fluid (ml/day): 1250mL; Method Used for Fluid Requirements: 1 ml/kcal    Nutrition Related Findings:  Unable to perform NFPE at this time. NGT in place. S/p soap suds enema yesterday with success per chart review, Soft BM documented. No edema.       Wounds:    None Current Nutrition Therapies:  DIET NPO  amino acid 4.25 % dextrose 5 % with electrolytes (CLINIMIX E) infusion  Current Parenteral Nutrition Orders:  · Type and Formula: Standard PPN   · Lipids: 250ml, Daily  · Duration: Continuous  · Rate/Volume: 40mL/hr  · Current PN Order Provides: 826kcal, 41g pro  · Goal PN Orders Provides: Rec'd Standard PPN at 60mL/hr, continuous + 250mL 20% lipids daily; providing 989kcal, 61g pro      Anthropometric Measures:  · Height:  5' 3\" (160 cm)  · Current Body Wt:  50 kg (110 lb 3.7 oz)   · Usual Body Wt:  (ANNIE)     · Ideal Body Wt:  115 lbs:  95.9 %   · BMI Category:  Underweight (BMI less than 22) age over 72     Wt Readings from Last 5 Encounters:   12/11/20 50 kg (110 lb 3.7 oz)   No wt hx available to assess    Nutrition Diagnosis:   · Inadequate oral intake related to altered GI function(2/2 SBO) as evidenced by NPO or clear liquid status due to medical condition, nutrition support-parenteral nutrition    Nutrition Interventions:   Food and/or Nutrient Delivery: Continue NPO, Modify parenteral nutrition  Nutrition Education and Counseling: No recommendations at this time  Coordination of Nutrition Care: Continue to monitor while inpatient    Goals:  Meet >75% EENs via PN while NPO  Tolerating oral diet in 7 days  Lytes WNL  Maintain bowel fx       Nutrition Monitoring and Evaluation:   Behavioral-Environmental Outcomes: None identified  Food/Nutrient Intake Outcomes: Diet advancement/tolerance, Parenteral nutrition intake/tolerance  Physical Signs/Symptoms Outcomes: Biochemical data, Chewing or swallowing, GI status, Weight    Discharge Planning:     Too soon to determine     Electronically signed by Shellie Mares on 12/15/2020 at 3:33 PM    Contact:  [phone]

## 2020-12-15 NOTE — CONSULTS
Hospitalist               Medical Consult Note: 12/15/2020      Subjective:     Patient is a 80-year-old female with history of hypertension and previous abdominal surgeries who was admitted on 12/11/2020 due to nausea vomiting and abdominal pain. KUB showed dilated loops of bowel in the mid abdomen possibly small bowel obstruction. He was started on NG tube to suction for decompression and kept n.p.o. Blood pressures found to be elevated so internal medicine invited to to assist with management of hypertension. Patient seen and examined at bedside  She complains of abdominal pain  She is in no acute distress    Problem List:  Patient Active Problem List   Diagnosis Code    Small bowel obstruction (HCC) K56.609         Medications reviewed  Current Facility-Administered Medications   Medication Dose Route Frequency    amino acid 4.25 % dextrose 5 % with electrolytes (CLINIMIX E) infusion   IntraVENous CONTINUOUS    fat emulsion 20% (LIPOSYN, INTRAlipid) infusion 250 mL  250 mL IntraVENous CONTINUOUS    nitroglycerin (NITROBID) 2 % ointment 1 Inch  1 Inch Topical BID    0.45% sodium chloride infusion  125 mL/hr IntraVENous CONTINUOUS    metoprolol (LOPRESSOR) injection 5 mg  5 mg IntraVENous Q4H    acetaminophen (TYLENOL) suppository 650 mg  650 mg Rectal Q4H PRN    famotidine (PF) (PEPCID) 20 mg in 0.9% sodium chloride 10 mL injection  20 mg IntraVENous Q12H    ondansetron (ZOFRAN) injection 4 mg  4 mg IntraVENous Q6H PRN       Review of Systems:   Review of Systems   Constitutional: Negative for chills, diaphoresis, fever, malaise/fatigue and weight loss. HENT: Negative for ear discharge, ear pain, hearing loss, nosebleeds, sinus pain and tinnitus. Respiratory: Negative for cough, hemoptysis, sputum production, shortness of breath and wheezing. Cardiovascular: Negative for chest pain, palpitations, orthopnea, claudication and leg swelling.    Gastrointestinal: Positive for abdominal pain. Negative for constipation, diarrhea, heartburn, nausea and vomiting. Genitourinary: Negative for dysuria, flank pain, frequency, hematuria and urgency. Musculoskeletal: Negative for back pain, falls, joint pain, myalgias and neck pain. Neurological: Negative for dizziness, tingling, focal weakness, seizures, weakness and headaches. Psychiatric/Behavioral: Negative for depression, hallucinations, memory loss, substance abuse and suicidal ideas. The patient is not nervous/anxious. Objective:   Physical Exam  Constitutional:       General: She is not in acute distress. Appearance: Normal appearance. HENT:      Head: Normocephalic and atraumatic. Mouth/Throat:      Mouth: Mucous membranes are moist.   Eyes:      Pupils: Pupils are equal, round, and reactive to light. Neck:      Musculoskeletal: No neck rigidity. Cardiovascular:      Rate and Rhythm: Normal rate and regular rhythm. Pulses: Normal pulses. Heart sounds: Normal heart sounds. Pulmonary:      Effort: Pulmonary effort is normal. No respiratory distress. Breath sounds: Normal breath sounds. No wheezing. Abdominal:      General: Bowel sounds are normal.      Palpations: Abdomen is soft. There is no mass. Tenderness: There is abdominal tenderness. Musculoskeletal:         General: No swelling, tenderness, deformity or signs of injury. Skin:     General: Skin is warm and dry. Findings: No bruising or erythema. Neurological:      General: No focal deficit present. Mental Status: She is alert and oriented to person, place, and time. Motor: No weakness.    Psychiatric:         Mood and Affect: Mood normal.          Visit Vitals  BP (!) 167/72 (BP Patient Position: At rest)   Pulse 68   Temp 98.2 °F (36.8 °C)   Resp 18   Ht 5' 3\" (1.6 m)   Wt 50 kg (110 lb 3.7 oz)   SpO2 99%   BMI 19.53 kg/m²          Data Review:       Recent Days:  Recent Labs 12/13/20  0726   WBC 7.8   HGB 10.8*   HCT 34.3*        Recent Labs     12/13/20  0726   *   K 3.3*   *   CO2 23   GLU 58*   BUN 58*   CREA 1.19*   CA 8.7        Past Medical History:   Diagnosis Date    Hypertension         Social History     Tobacco Use    Smoking status: Never Smoker    Smokeless tobacco: Never Used   Substance Use Topics    Alcohol use: Not Currently     Frequency: Never    Drug use: Not Currently      Assessment/Plan     1. Small bowel obstruction  Adhesions from previous abdominal surgeries  Keep n.p.o. with NG tube to suction as recommended by general surgery    2. Acute dehydration  Secondary to poor oral intake  Sodium trending up  Continue with IV fluids but change to half-normal saline    3. Hypertension  Blood pressures are above goal  Will add Nitropaste twice a day    4. Metabolic encephalopathy  Secondary to hydration   urinalysis to rule out UTI  Continue with IV fluids    CBC, BMP in a.m. DVT prophylaxis    Comments: Will add Nitropaste for better control of blood pressures and continue to follow along. Thank you  for involving me in the care of this patient.     Ursula Wright NP

## 2020-12-16 ENCOUNTER — APPOINTMENT (OUTPATIENT)
Dept: CT IMAGING | Age: 85
DRG: 388 | End: 2020-12-16
Attending: COLON & RECTAL SURGERY
Payer: MEDICARE

## 2020-12-16 LAB
ANION GAP SERPL CALC-SCNC: 9 MMOL/L (ref 5–15)
BASOPHILS # BLD: 0 K/UL (ref 0–0.1)
BASOPHILS NFR BLD: 0 % (ref 0–1)
BUN SERPL-MCNC: 23 MG/DL (ref 6–20)
BUN/CREAT SERPL: 26 (ref 12–20)
CA-I BLD-MCNC: 8.4 MG/DL (ref 8.5–10.1)
CHLORIDE SERPL-SCNC: 119 MMOL/L (ref 97–108)
CO2 SERPL-SCNC: 20 MMOL/L (ref 21–32)
CREAT SERPL-MCNC: 0.87 MG/DL (ref 0.55–1.02)
DIFFERENTIAL METHOD BLD: ABNORMAL
EOSINOPHIL # BLD: 0.1 K/UL (ref 0–0.4)
EOSINOPHIL NFR BLD: 1 % (ref 0–7)
ERYTHROCYTE [DISTWIDTH] IN BLOOD BY AUTOMATED COUNT: 14.9 % (ref 11.5–14.5)
GLUCOSE SERPL-MCNC: 106 MG/DL (ref 65–100)
HCT VFR BLD AUTO: 33.2 % (ref 35–47)
HGB BLD-MCNC: 10.4 G/DL (ref 11.5–16)
IMM GRANULOCYTES # BLD AUTO: 0 K/UL
IMM GRANULOCYTES NFR BLD AUTO: 0 %
LYMPHOCYTES # BLD: 0.6 K/UL (ref 0.8–3.5)
LYMPHOCYTES NFR BLD: 9 % (ref 12–49)
MCH RBC QN AUTO: 26.5 PG (ref 26–34)
MCHC RBC AUTO-ENTMCNC: 31.3 G/DL (ref 30–36.5)
MCV RBC AUTO: 84.7 FL (ref 80–99)
METAMYELOCYTES NFR BLD MANUAL: 1 %
MONOCYTES # BLD: 0.2 K/UL (ref 0–1)
MONOCYTES NFR BLD: 4 % (ref 5–13)
MYELOCYTES NFR BLD MANUAL: 6 %
NEUTS BAND NFR BLD MANUAL: 5 % (ref 0–6)
NEUTS SEG # BLD: 4.9 K/UL (ref 1.8–8)
NEUTS SEG NFR BLD: 74 % (ref 32–75)
PLATELET # BLD AUTO: 324 K/UL (ref 150–400)
PLATELET COMMENTS,PCOM: ABNORMAL
PMV BLD AUTO: 11.1 FL (ref 8.9–12.9)
POTASSIUM SERPL-SCNC: 2.8 MMOL/L (ref 3.5–5.1)
RBC # BLD AUTO: 3.92 M/UL (ref 3.8–5.2)
RBC MORPH BLD: ABNORMAL
SODIUM SERPL-SCNC: 148 MMOL/L (ref 136–145)
WBC # BLD AUTO: 6.2 K/UL (ref 3.6–11)

## 2020-12-16 PROCEDURE — 80048 BASIC METABOLIC PNL TOTAL CA: CPT

## 2020-12-16 PROCEDURE — 74011000250 HC RX REV CODE- 250: Performed by: NURSE PRACTITIONER

## 2020-12-16 PROCEDURE — 74011000250 HC RX REV CODE- 250: Performed by: COLON & RECTAL SURGERY

## 2020-12-16 PROCEDURE — 65270000029 HC RM PRIVATE

## 2020-12-16 PROCEDURE — 85025 COMPLETE CBC W/AUTO DIFF WBC: CPT

## 2020-12-16 PROCEDURE — 74011250637 HC RX REV CODE- 250/637: Performed by: NURSE PRACTITIONER

## 2020-12-16 PROCEDURE — 99232 SBSQ HOSP IP/OBS MODERATE 35: CPT | Performed by: COLON & RECTAL SURGERY

## 2020-12-16 PROCEDURE — 74011000258 HC RX REV CODE- 258: Performed by: COLON & RECTAL SURGERY

## 2020-12-16 PROCEDURE — 74011250636 HC RX REV CODE- 250/636: Performed by: COLON & RECTAL SURGERY

## 2020-12-16 PROCEDURE — 36415 COLL VENOUS BLD VENIPUNCTURE: CPT

## 2020-12-16 RX ORDER — POTASSIUM CHLORIDE 7.45 MG/ML
10 INJECTION INTRAVENOUS
Status: DISPENSED | OUTPATIENT
Start: 2020-12-16 | End: 2020-12-16

## 2020-12-16 RX ADMIN — ASCORBIC ACID, VITAMIN A PALMITATE, CHOLECALCIFEROL, THIAMINE HYDROCHLORIDE, RIBOFLAVIN-5 PHOSPHATE SODIUM, PYRIDOXINE HYDROCHLORIDE, NIACINAMIDE, DEXPANTHENOL, ALPHA-TOCOPHEROL ACETATE, VITAMIN K1, FOLIC ACID, BIOTIN, CYANOCOBALAMIN: 200; 3300; 200; 6; 3.6; 6; 40; 15; 10; 150; 600; 60; 5 INJECTION, SOLUTION INTRAVENOUS at 21:36

## 2020-12-16 RX ADMIN — METOPROLOL TARTRATE 5 MG: 5 INJECTION INTRAVENOUS at 21:26

## 2020-12-16 RX ADMIN — POTASSIUM CHLORIDE 10 MEQ: 7.46 INJECTION, SOLUTION INTRAVENOUS at 17:32

## 2020-12-16 RX ADMIN — I.V. FAT EMULSION 250 ML: 20 EMULSION INTRAVENOUS at 21:33

## 2020-12-16 RX ADMIN — NITROGLYCERIN 1 INCH: 20 OINTMENT TOPICAL at 11:30

## 2020-12-16 RX ADMIN — NITROGLYCERIN 1 INCH: 20 OINTMENT TOPICAL at 17:30

## 2020-12-16 RX ADMIN — POTASSIUM CHLORIDE 10 MEQ: 7.46 INJECTION, SOLUTION INTRAVENOUS at 19:29

## 2020-12-16 RX ADMIN — METOPROLOL TARTRATE 5 MG: 5 INJECTION INTRAVENOUS at 17:26

## 2020-12-16 RX ADMIN — METOPROLOL TARTRATE 5 MG: 5 INJECTION INTRAVENOUS at 06:43

## 2020-12-16 RX ADMIN — FAMOTIDINE 20 MG: 10 INJECTION INTRAVENOUS at 11:30

## 2020-12-16 RX ADMIN — FAMOTIDINE 20 MG: 10 INJECTION INTRAVENOUS at 21:26

## 2020-12-16 RX ADMIN — METOPROLOL TARTRATE 5 MG: 5 INJECTION INTRAVENOUS at 11:30

## 2020-12-16 RX ADMIN — NITROGLYCERIN 1 INCH: 20 OINTMENT TOPICAL at 06:43

## 2020-12-16 NOTE — CONSULTS
Hospitalist               Medical Consult Note: 12/16/2020      Subjective:     Patient is a 44-year-old female with history of hypertension and previous abdominal surgeries who was admitted on 12/11/2020 due to nausea vomiting and abdominal pain. KUB showed dilated loops of bowel in the mid abdomen possibly small bowel obstruction. He was started on NG tube to suction for decompression and kept n.p.o. Blood pressures found to be elevated so internal medicine invited to to assist with management of hypertension. Patient seen and examined at bedside  Reports abdominal pain is better today  She is in no acute distress    Problem List:  Patient Active Problem List   Diagnosis Code    Small bowel obstruction (HCC) K56.609         Medications reviewed  Current Facility-Administered Medications   Medication Dose Route Frequency    nitroglycerin (NITROBID) 2 % ointment 1 Inch  1 Inch Topical Q6H    amino acid 4.25 % dextrose 5 % with electrolytes (CLINIMIX E) infusion   IntraVENous CONTINUOUS    fat emulsion 20% (LIPOSYN, INTRAlipid) infusion 250 mL  250 mL IntraVENous CONTINUOUS    amino acid 4.25 % dextrose 5 % with electrolytes (CLINIMIX E) infusion   IntraVENous CONTINUOUS    0.45% sodium chloride infusion  125 mL/hr IntraVENous CONTINUOUS    metoprolol (LOPRESSOR) injection 5 mg  5 mg IntraVENous Q4H    acetaminophen (TYLENOL) suppository 650 mg  650 mg Rectal Q4H PRN    famotidine (PF) (PEPCID) 20 mg in 0.9% sodium chloride 10 mL injection  20 mg IntraVENous Q12H    ondansetron (ZOFRAN) injection 4 mg  4 mg IntraVENous Q6H PRN       Review of Systems:   Review of Systems   Constitutional: Negative for chills, diaphoresis, fever, malaise/fatigue and weight loss. HENT: Negative for ear discharge, ear pain, hearing loss, nosebleeds, sinus pain and tinnitus. Respiratory: Negative for cough, hemoptysis, sputum production, shortness of breath and wheezing. Cardiovascular: Negative for chest pain, palpitations, orthopnea, claudication and leg swelling. Gastrointestinal: Positive for abdominal pain. Negative for constipation, diarrhea, heartburn, nausea and vomiting. Genitourinary: Negative for dysuria, flank pain, frequency, hematuria and urgency. Musculoskeletal: Negative for back pain, falls, joint pain, myalgias and neck pain. Neurological: Negative for dizziness, tingling, focal weakness, seizures, weakness and headaches. Psychiatric/Behavioral: Negative for depression, hallucinations, memory loss, substance abuse and suicidal ideas. The patient is not nervous/anxious. Objective:   Physical Exam  Constitutional:       General: She is not in acute distress. Appearance: Normal appearance. HENT:      Head: Normocephalic and atraumatic. Mouth/Throat:      Mouth: Mucous membranes are moist.   Eyes:      Pupils: Pupils are equal, round, and reactive to light. Neck:      Musculoskeletal: No neck rigidity. Cardiovascular:      Rate and Rhythm: Normal rate and regular rhythm. Pulses: Normal pulses. Heart sounds: Normal heart sounds. Pulmonary:      Effort: Pulmonary effort is normal. No respiratory distress. Breath sounds: Normal breath sounds. No wheezing. Abdominal:      General: Bowel sounds are normal.      Palpations: Abdomen is soft. There is no mass. Tenderness: There is abdominal tenderness. Musculoskeletal:         General: No swelling, tenderness, deformity or signs of injury. Skin:     General: Skin is warm and dry. Findings: No bruising or erythema. Neurological:      General: No focal deficit present. Mental Status: She is alert and oriented to person, place, and time. Motor: No weakness.    Psychiatric:         Mood and Affect: Mood normal.          Visit Vitals  BP (!) 159/80   Pulse 94   Temp 97.6 °F (36.4 °C)   Resp 18   Ht 5' 3\" (1.6 m)   Wt 45.1 kg (99 lb 6.8 oz)   SpO2 96% BMI 17.61 kg/m²          Data Review:       Recent Days:  Recent Labs     12/15/20  2015   WBC 5.2   HGB 11.0*   HCT 35.0        Recent Labs     12/16/20  1140 12/15/20  2015   * 152*   K 2.8* 3.0*   * 120*   CO2 20* 16*   * 39*   BUN 23* 26*   CREA 0.87 0.80   CA 8.4* 8.6   ALB  --  2.4*   TBILI  --  0.4   ALT  --  13        Past Medical History:   Diagnosis Date    Hypertension         Social History     Tobacco Use    Smoking status: Never Smoker    Smokeless tobacco: Never Used   Substance Use Topics    Alcohol use: Not Currently     Frequency: Never    Drug use: Not Currently      Assessment/Plan     1. Small bowel obstruction  Likely secondary to adhesions from previous abdominal surgeries  Keep n.p.o. with NG tube to suction as recommended by general surgery  Repeat CT abdomen and pelvis has been ordered by general surgery and pending. She is on TPN for nutrition ( managed by surgery)      2. Acute dehydration  Secondary to poor oral intake  Sodium trending was trending up yesterday so fluids changed to half-normal saline  Labs today pending. 3.  Hypertension  Blood pressures have improved after adding Nitro-Bid ointment  Continue with Lopressor as well. 4.  Metabolic encephalopathy  Secondary to dehydration. I ordered a urinalysis to rule out UTI and pending. Continue with IV fluids    CBC, BMP in a.m. DVT prophylaxis    Comments: Blood pressures are improving and overall she appears to be improving clinically. Will continue to follow this patient with you.  Odilia Perla NP

## 2020-12-16 NOTE — PROGRESS NOTES
Progress Note    Patient: Felipe Bocanegra MRN: [id number]  SSN: [id number]    YOB: 1927  Age: 80 y.o. Sex: female      Admit Date: 12/11/2020    LOS: 5 days     Subjective:   Hospital day 6 for partial small bowel obstruction  Patient seen in bed resting comfortably    Objective:     Vitals:    12/15/20 2134 12/16/20 0642 12/16/20 0651 12/16/20 1130   BP:   (!) 166/77 (!) 159/80   Pulse:    94   Resp:       Temp:       SpO2: 96%      Weight:  99 lb 6.8 oz (45.1 kg)     Height:            Intake and Output:  Current Shift: No intake/output data recorded. Last three shifts: 12/14 1901 - 12/16 0700  In: -   Out: 601 [Urine:1]    Review of Systems:  ROS     Physical Exam:   Abdomen is soft, nontender, nondistended    Lab/Data Review:  Recent Results (from the past 24 hour(s))   CBC WITH AUTOMATED DIFF    Collection Time: 12/15/20  8:15 PM   Result Value Ref Range    WBC 5.2 3.6 - 11.0 K/uL    RBC 4.06 3.80 - 5.20 M/uL    HGB 11.0 (L) 11.5 - 16.0 g/dL    HCT 35.0 35.0 - 47.0 %    MCV 86.2 80.0 - 99.0 FL    MCH 27.1 26.0 - 34.0 PG    MCHC 31.4 30.0 - 36.5 g/dL    RDW 15.1 (H) 11.5 - 14.5 %    PLATELET 283 499 - 316 K/uL    MPV 11.7 8.9 - 12.9 FL    NEUTROPHILS 78 (H) 32 - 75 %    LYMPHOCYTES 11 (L) 12 - 49 %    MONOCYTES 8 5 - 13 %    EOSINOPHILS 1 0 - 7 %    BASOPHILS 0 0 - 1 %    IMMATURE GRANULOCYTES 2 (H) 0.0 - 0.5 %    ABS. NEUTROPHILS 4.1 1.8 - 8.0 K/UL    ABS. LYMPHOCYTES 0.6 (L) 0.8 - 3.5 K/UL    ABS. MONOCYTES 0.4 0.0 - 1.0 K/UL    ABS. EOSINOPHILS 0.1 0.0 - 0.4 K/UL    ABS. BASOPHILS 0.0 0.0 - 0.1 K/UL    ABS. IMM.  GRANS. 0.1 (H) 0.00 - 0.04 K/UL    DF AUTOMATED     METABOLIC PANEL, COMPREHENSIVE    Collection Time: 12/15/20  8:15 PM   Result Value Ref Range    Sodium 152 (H) 136 - 145 mmol/L    Potassium 3.0 (L) 3.5 - 5.1 mmol/L    Chloride 120 (H) 97 - 108 mmol/L    CO2 16 (L) 21 - 32 mmol/L    Anion gap 16 (H) 5 - 15 mmol/L    Glucose 39 (LL) 65 - 100 mg/dL    BUN 26 (H) 6 - 20 mg/dL Creatinine 0.80 0.55 - 1.02 mg/dL    BUN/Creatinine ratio 33 (H) 12 - 20      GFR est AA >60 >60 ml/min/1.73m2    GFR est non-AA >60 >60 ml/min/1.73m2    Calcium 8.6 8.5 - 10.1 mg/dL    Bilirubin, total 0.4 0.2 - 1.0 mg/dL    AST (SGOT) 17 15 - 37 U/L    ALT (SGPT) 13 12 - 78 U/L    Alk.  phosphatase 61 45 - 117 U/L    Protein, total 5.8 (L) 6.4 - 8.2 g/dL    Albumin 2.4 (L) 3.5 - 5.0 g/dL    Globulin 3.4 2.0 - 4.0 g/dL    A-G Ratio 0.7 (L) 1.1 - 2.2          Assessment:     Active Problems:    Small bowel obstruction (HCC) (12/11/2020)        Plan:   Continues to have fairly high NG tube outputs, 600 mL over the last 12 hours  Abdominal exam is markedly improved, no tenderness on my exam today  Labs are pending  Continue PPN will increase rate to 85 mL/h  Will order CT scan of the abdomen pelvis with contrast via nasogastric tube    Signed By: Juliette Dai MD     December 16, 2020

## 2020-12-16 NOTE — PROGRESS NOTES
Bedside and Verbal shift change report given to Meng Peraza RN (oncoming nurse) by Marisa Mondragon LPN (offgoing nurse). Report included the following information SBAR, Kardex and MAR.

## 2020-12-17 ENCOUNTER — APPOINTMENT (OUTPATIENT)
Dept: CT IMAGING | Age: 85
DRG: 388 | End: 2020-12-17
Attending: COLON & RECTAL SURGERY
Payer: MEDICARE

## 2020-12-17 PROCEDURE — 99232 SBSQ HOSP IP/OBS MODERATE 35: CPT | Performed by: COLON & RECTAL SURGERY

## 2020-12-17 PROCEDURE — 74011250637 HC RX REV CODE- 250/637: Performed by: NURSE PRACTITIONER

## 2020-12-17 PROCEDURE — 65270000029 HC RM PRIVATE

## 2020-12-17 PROCEDURE — 74011000258 HC RX REV CODE- 258: Performed by: COLON & RECTAL SURGERY

## 2020-12-17 PROCEDURE — 74011000250 HC RX REV CODE- 250: Performed by: COLON & RECTAL SURGERY

## 2020-12-17 PROCEDURE — 74011000250 HC RX REV CODE- 250: Performed by: NURSE PRACTITIONER

## 2020-12-17 PROCEDURE — 94762 N-INVAS EAR/PLS OXIMTRY CONT: CPT

## 2020-12-17 PROCEDURE — 74011250636 HC RX REV CODE- 250/636: Performed by: NURSE PRACTITIONER

## 2020-12-17 PROCEDURE — 74011000636 HC RX REV CODE- 636: Performed by: COLON & RECTAL SURGERY

## 2020-12-17 PROCEDURE — 74011250636 HC RX REV CODE- 250/636: Performed by: COLON & RECTAL SURGERY

## 2020-12-17 PROCEDURE — 74176 CT ABD & PELVIS W/O CONTRAST: CPT

## 2020-12-17 RX ORDER — LABETALOL HCL 20 MG/4 ML
10 SYRINGE (ML) INTRAVENOUS 3 TIMES DAILY
Status: DISCONTINUED | OUTPATIENT
Start: 2020-12-17 | End: 2020-12-19

## 2020-12-17 RX ORDER — LABETALOL HCL 20 MG/4 ML
10 SYRINGE (ML) INTRAVENOUS ONCE
Status: DISCONTINUED | OUTPATIENT
Start: 2020-12-17 | End: 2020-12-17

## 2020-12-17 RX ADMIN — DIATRIZOATE MEGLUMINE AND DIATRIZOATE SODIUM 30 ML: 660; 100 LIQUID ORAL; RECTAL at 11:39

## 2020-12-17 RX ADMIN — LABETALOL HYDROCHLORIDE 10 MG: 5 INJECTION, SOLUTION INTRAVENOUS at 17:45

## 2020-12-17 RX ADMIN — NITROGLYCERIN 1 INCH: 20 OINTMENT TOPICAL at 12:37

## 2020-12-17 RX ADMIN — NITROGLYCERIN 1 INCH: 20 OINTMENT TOPICAL at 17:45

## 2020-12-17 RX ADMIN — FAMOTIDINE 20 MG: 10 INJECTION INTRAVENOUS at 21:39

## 2020-12-17 RX ADMIN — METOPROLOL TARTRATE 5 MG: 5 INJECTION INTRAVENOUS at 02:20

## 2020-12-17 RX ADMIN — METOPROLOL TARTRATE 5 MG: 5 INJECTION INTRAVENOUS at 06:03

## 2020-12-17 RX ADMIN — LABETALOL HYDROCHLORIDE 10 MG: 5 INJECTION, SOLUTION INTRAVENOUS at 21:39

## 2020-12-17 RX ADMIN — ASCORBIC ACID, VITAMIN A PALMITATE, CHOLECALCIFEROL, THIAMINE HYDROCHLORIDE, RIBOFLAVIN-5 PHOSPHATE SODIUM, PYRIDOXINE HYDROCHLORIDE, NIACINAMIDE, DEXPANTHENOL, ALPHA-TOCOPHEROL ACETATE, VITAMIN K1, FOLIC ACID, BIOTIN, CYANOCOBALAMIN: 200; 3300; 200; 6; 3.6; 6; 40; 15; 10; 150; 600; 60; 5 INJECTION, SOLUTION INTRAVENOUS at 22:47

## 2020-12-17 RX ADMIN — FAMOTIDINE 20 MG: 10 INJECTION INTRAVENOUS at 10:28

## 2020-12-17 RX ADMIN — I.V. FAT EMULSION 250 ML: 20 EMULSION INTRAVENOUS at 22:46

## 2020-12-17 RX ADMIN — NITROGLYCERIN 1 INCH: 20 OINTMENT TOPICAL at 06:03

## 2020-12-17 RX ADMIN — NITROGLYCERIN 1 INCH: 20 OINTMENT TOPICAL at 02:20

## 2020-12-17 RX ADMIN — LABETALOL HYDROCHLORIDE 10 MG: 5 INJECTION, SOLUTION INTRAVENOUS at 10:28

## 2020-12-17 NOTE — CONSULTS
Hospitalist               Medical Consult Note: 12/17/2020      Subjective:     Patient is a 80-year-old female with history of hypertension and previous abdominal surgeries who was admitted on 12/11/2020 due to nausea vomiting and abdominal pain. KUB showed dilated loops of bowel in the mid abdomen possibly small bowel obstruction. He was started on NG tube to suction for decompression and kept n.p.o. Blood pressures found to be elevated so internal medicine invited to to assist with management of hypertension. Patient seen and examined at bedside  She is in no acute distress  No acute events overnight    Problem List:  Patient Active Problem List   Diagnosis Code    Small bowel obstruction (HCC) K56.609         Medications reviewed  Current Facility-Administered Medications   Medication Dose Route Frequency    nitroglycerin (NITROBID) 2 % ointment 1 Inch  1 Inch Topical Q6H    amino acid 4.25 % dextrose 5 % with electrolytes (CLINIMIX E) infusion   IntraVENous CONTINUOUS    0.45% sodium chloride infusion  125 mL/hr IntraVENous CONTINUOUS    metoprolol (LOPRESSOR) injection 5 mg  5 mg IntraVENous Q4H    acetaminophen (TYLENOL) suppository 650 mg  650 mg Rectal Q4H PRN    famotidine (PF) (PEPCID) 20 mg in 0.9% sodium chloride 10 mL injection  20 mg IntraVENous Q12H    ondansetron (ZOFRAN) injection 4 mg  4 mg IntraVENous Q6H PRN       Review of Systems:   Review of Systems   Constitutional: Negative for chills, diaphoresis, fever, malaise/fatigue and weight loss. HENT: Negative for ear discharge, ear pain, hearing loss, nosebleeds, sinus pain and tinnitus. Respiratory: Negative for cough, hemoptysis, sputum production, shortness of breath and wheezing. Cardiovascular: Negative for chest pain, palpitations, orthopnea, claudication and leg swelling. Gastrointestinal: Positive for abdominal pain. Negative for constipation, diarrhea, heartburn, nausea and vomiting. Genitourinary: Negative for dysuria, flank pain, frequency, hematuria and urgency. Musculoskeletal: Negative for back pain, falls, joint pain, myalgias and neck pain. Neurological: Negative for dizziness, tingling, focal weakness, seizures, weakness and headaches. Psychiatric/Behavioral: Negative for depression, hallucinations, memory loss, substance abuse and suicidal ideas. The patient is not nervous/anxious. Objective:   Physical Exam  Constitutional:       General: She is not in acute distress. Appearance: Normal appearance. HENT:      Head: Normocephalic and atraumatic. Mouth/Throat:      Mouth: Mucous membranes are moist.   Eyes:      Pupils: Pupils are equal, round, and reactive to light. Neck:      Musculoskeletal: No neck rigidity. Cardiovascular:      Rate and Rhythm: Normal rate and regular rhythm. Pulses: Normal pulses. Heart sounds: Normal heart sounds. Pulmonary:      Effort: Pulmonary effort is normal. No respiratory distress. Breath sounds: Normal breath sounds. No wheezing. Abdominal:      General: Bowel sounds are normal.      Palpations: Abdomen is soft. There is no mass. Tenderness: There is abdominal tenderness. Musculoskeletal:         General: No swelling, tenderness, deformity or signs of injury. Skin:     General: Skin is warm and dry. Findings: No bruising or erythema. Neurological:      General: No focal deficit present. Mental Status: She is alert and oriented to person, place, and time. Motor: No weakness.    Psychiatric:         Mood and Affect: Mood normal.          Visit Vitals  BP (!) 173/92   Pulse 89   Temp 97.7 °F (36.5 °C)   Resp 16   Ht 5' 3\" (1.6 m)   Wt 45.1 kg (99 lb 6.8 oz)   SpO2 97%   BMI 17.61 kg/m²          Data Review:       Recent Days:  Recent Labs     12/16/20  1140 12/15/20  2015   WBC 6.2 5.2   HGB 10.4* 11.0*   HCT 33.2* 35.0    333     Recent Labs     12/16/20  1140 12/15/20  2015   * 152*   K 2.8* 3.0*   * 120*   CO2 20* 16*   * 39*   BUN 23* 26*   CREA 0.87 0.80   CA 8.4* 8.6   ALB  --  2.4*   TBILI  --  0.4   ALT  --  13        Past Medical History:   Diagnosis Date    Hypertension         Social History     Tobacco Use    Smoking status: Never Smoker    Smokeless tobacco: Never Used   Substance Use Topics    Alcohol use: Not Currently     Frequency: Never    Drug use: Not Currently      Assessment/Plan     1. Small bowel obstruction  Likely secondary to adhesions from previous abdominal surgeries  Keep n.p.o. with NG tube to suction as recommended by general surgery  Repeat CT abdomen and pelvis has been ordered by general surgery and pending. She is on TPN for nutrition ( managed by surgery)    2. Acute dehydration  Secondary to poor oral intake  Continue gentle IV rehydration   Labs today pending. 3.  Hypertension  Blood pressure this morning is 173/92  Continue Nitrobid and lopressor  Will change Lopressor to labetalol to better blood pressure control  Will order for a one time dose of Labetalol 10 mg IV    4. Metabolic encephalopathy  Secondary to dehydration. I ordered a urinalysis to rule out UTI and pending. Continue with IV fluids    CBC, BMP in a.m. DVT prophylaxis    Comments: Will continue to follow this patient with you.      Melissa Kimble NP

## 2020-12-17 NOTE — PROGRESS NOTES
Bedside and Verbal shift change report given to Ramon Anderson RN (oncoming nurse) by Magy Burleson LPN (offgoing nurse). Report included the following information SBAR, Kardex, ED Summary, Intake/Output and MAR.

## 2020-12-17 NOTE — PROGRESS NOTES
Progress Note    Patient: Venu Deleon MRN: [id number]  SSN: [id number]    YOB: 1927  Age: 80 y.o. Sex: female      Admit Date: 12/11/2020    LOS: 6 days     Subjective:   Hospital day 7 for partial small bowel obstruction  Patient seen in bed  No acute distress    Objective:     Vitals:    12/17/20 1005 12/17/20 1141 12/17/20 1235 12/17/20 1703   BP:  (!) 167/87 (!) 167/89 (!) 170/83   Pulse:   78 80   Resp:    16   Temp:   97.8 °F (36.6 °C) 97.6 °F (36.4 °C)   SpO2: 96%  98% 95%   Weight:       Height:            Intake and Output:  Current Shift: No intake/output data recorded. Last three shifts: 12/15 1901 - 12/17 0700  In: -   Out: 600     Review of Systems:  ROS     Physical Exam:   Abdomen is soft, nontender, nondistended. Lab/Data Review:  No results found for this or any previous visit (from the past 24 hour(s)). A.m. labs have not been drawn    Assessment:     Active Problems:    Small bowel obstruction ([address] (12/11/2020)        Plan:   Patient has CT scan of the abdomen pelvis, no official read yet however to me it appears that contrast goes all the way into the right colon. There is some mildly dilated loops of small bowel. Laboratory values for today. I have no ins and outs for yesterday so I do not know how much the NG tube put out. Pending CT radiology read I believe we can probably get the NG tube out tomorrow and see how she does.     Signed By: Phong Ridley MD     December 17, 2020

## 2020-12-17 NOTE — ROUTINE PROCESS
Bedside and Verbal shift change report given to [facility] RN(oncoming nurse) by Blanca Chatman RN(offgoing nurse). Report included the following information SBAR, Intake/Output, MAR and Accordion.

## 2020-12-17 NOTE — PROGRESS NOTES
Problem: Pressure Injury - Risk of  Goal: *Prevention of pressure injury  Description: Document Bubba Scale and appropriate interventions in the flowsheet. Outcome: Progressing Towards Goal  Note: Pressure Injury Interventions:  Sensory Interventions: Assess changes in LOC, Assess need for specialty bed, Avoid rigorous massage over bony prominences, Discuss PT/OT consult with provider, Keep linens dry and wrinkle-free, Maintain/enhance activity level, Minimize linen layers, Monitor skin under medical devices    Moisture Interventions: Absorbent underpads, Apply protective barrier, creams and emollients, Assess need for specialty bed, Internal/External urinary devices, Limit adult briefs, Maintain skin hydration (lotion/cream), Minimize layers, Moisture barrier    Activity Interventions: Increase time out of bed, Pressure redistribution bed/mattress(bed type), PT/OT evaluation    Mobility Interventions: Assess need for specialty bed, HOB 30 degrees or less, Pressure redistribution bed/mattress (bed type), PT/OT evaluation, Turn and reposition approx.  every two hours(pillow and wedges)    Nutrition Interventions: Discuss nutritional consult with provider    Friction and Shear Interventions: Apply protective barrier, creams and emollients, HOB 30 degrees or less, Lift sheet, Lift team/patient mobility team, Minimize layers, Transfer aides:transfer board/Shell lift/ceiling lift, Transferring/repositioning devices

## 2020-12-18 LAB
ANION GAP SERPL CALC-SCNC: 9 MMOL/L (ref 5–15)
BASOPHILS # BLD: 0 K/UL (ref 0–0.1)
BASOPHILS NFR BLD: 0 % (ref 0–1)
BUN SERPL-MCNC: 36 MG/DL (ref 6–20)
BUN/CREAT SERPL: 43 (ref 12–20)
CA-I BLD-MCNC: 8.3 MG/DL (ref 8.5–10.1)
CHLORIDE SERPL-SCNC: 110 MMOL/L (ref 97–108)
CO2 SERPL-SCNC: 22 MMOL/L (ref 21–32)
CREAT SERPL-MCNC: 0.84 MG/DL (ref 0.55–1.02)
DIFFERENTIAL METHOD BLD: ABNORMAL
EOSINOPHIL # BLD: 0.2 K/UL (ref 0–0.4)
EOSINOPHIL NFR BLD: 2 % (ref 0–7)
ERYTHROCYTE [DISTWIDTH] IN BLOOD BY AUTOMATED COUNT: 14.9 % (ref 11.5–14.5)
GLUCOSE BLD STRIP.AUTO-MCNC: 109 MG/DL (ref 65–100)
GLUCOSE BLD STRIP.AUTO-MCNC: 120 MG/DL (ref 65–100)
GLUCOSE SERPL-MCNC: 111 MG/DL (ref 65–100)
HCT VFR BLD AUTO: 34.4 % (ref 35–47)
HGB BLD-MCNC: 11.1 G/DL (ref 11.5–16)
IMM GRANULOCYTES # BLD AUTO: 0 K/UL
IMM GRANULOCYTES NFR BLD AUTO: 0 %
LYMPHOCYTES # BLD: 1.7 K/UL (ref 0.8–3.5)
LYMPHOCYTES NFR BLD: 16 % (ref 12–49)
MAGNESIUM SERPL-MCNC: 2.1 MG/DL (ref 1.6–2.4)
MCH RBC QN AUTO: 26.8 PG (ref 26–34)
MCHC RBC AUTO-ENTMCNC: 32.3 G/DL (ref 30–36.5)
MCV RBC AUTO: 83.1 FL (ref 80–99)
MONOCYTES # BLD: 0.5 K/UL (ref 0–1)
MONOCYTES NFR BLD: 5 % (ref 5–13)
NEUTS SEG # BLD: 8.5 K/UL (ref 1.8–8)
NEUTS SEG NFR BLD: 77 % (ref 32–75)
NRBC # BLD: 0 K/UL (ref 0–0.01)
NRBC BLD-RTO: 0 PER 100 WBC
PERFORMED BY, TECHID: ABNORMAL
PERFORMED BY, TECHID: ABNORMAL
PHOSPHATE SERPL-MCNC: 3.8 MG/DL (ref 2.6–4.7)
PLATELET # BLD AUTO: 256 K/UL (ref 150–400)
PLATELET COMMENTS,PCOM: ABNORMAL
PMV BLD AUTO: 10.7 FL (ref 8.9–12.9)
POTASSIUM SERPL-SCNC: 3.4 MMOL/L (ref 3.5–5.1)
RBC # BLD AUTO: 4.14 M/UL (ref 3.8–5.2)
RBC MORPH BLD: ABNORMAL
SODIUM SERPL-SCNC: 141 MMOL/L (ref 136–145)
WBC # BLD AUTO: 10.9 K/UL (ref 3.6–11)

## 2020-12-18 PROCEDURE — 74011250636 HC RX REV CODE- 250/636: Performed by: NURSE PRACTITIONER

## 2020-12-18 PROCEDURE — 85025 COMPLETE CBC W/AUTO DIFF WBC: CPT

## 2020-12-18 PROCEDURE — 74011000250 HC RX REV CODE- 250: Performed by: COLON & RECTAL SURGERY

## 2020-12-18 PROCEDURE — 74011250637 HC RX REV CODE- 250/637: Performed by: NURSE PRACTITIONER

## 2020-12-18 PROCEDURE — 65270000029 HC RM PRIVATE

## 2020-12-18 PROCEDURE — 84100 ASSAY OF PHOSPHORUS: CPT

## 2020-12-18 PROCEDURE — 82962 GLUCOSE BLOOD TEST: CPT

## 2020-12-18 PROCEDURE — 74011250636 HC RX REV CODE- 250/636: Performed by: COLON & RECTAL SURGERY

## 2020-12-18 PROCEDURE — 80048 BASIC METABOLIC PNL TOTAL CA: CPT

## 2020-12-18 PROCEDURE — 99232 SBSQ HOSP IP/OBS MODERATE 35: CPT | Performed by: COLON & RECTAL SURGERY

## 2020-12-18 PROCEDURE — 74011000258 HC RX REV CODE- 258: Performed by: COLON & RECTAL SURGERY

## 2020-12-18 PROCEDURE — 83735 ASSAY OF MAGNESIUM: CPT

## 2020-12-18 PROCEDURE — 36415 COLL VENOUS BLD VENIPUNCTURE: CPT

## 2020-12-18 RX ADMIN — ASCORBIC ACID, VITAMIN A PALMITATE, CHOLECALCIFEROL, THIAMINE HYDROCHLORIDE, RIBOFLAVIN-5 PHOSPHATE SODIUM, PYRIDOXINE HYDROCHLORIDE, NIACINAMIDE, DEXPANTHENOL, ALPHA-TOCOPHEROL ACETATE, VITAMIN K1, FOLIC ACID, BIOTIN, CYANOCOBALAMIN: 200; 3300; 200; 6; 3.6; 6; 40; 15; 10; 150; 600; 60; 5 INJECTION, SOLUTION INTRAVENOUS at 21:35

## 2020-12-18 RX ADMIN — NITROGLYCERIN 1 INCH: 20 OINTMENT TOPICAL at 23:52

## 2020-12-18 RX ADMIN — NITROGLYCERIN 1 INCH: 20 OINTMENT TOPICAL at 00:06

## 2020-12-18 RX ADMIN — LABETALOL HYDROCHLORIDE 10 MG: 5 INJECTION, SOLUTION INTRAVENOUS at 08:02

## 2020-12-18 RX ADMIN — NITROGLYCERIN 1 INCH: 20 OINTMENT TOPICAL at 05:29

## 2020-12-18 RX ADMIN — LABETALOL HYDROCHLORIDE 10 MG: 5 INJECTION, SOLUTION INTRAVENOUS at 21:34

## 2020-12-18 RX ADMIN — FAMOTIDINE 20 MG: 10 INJECTION INTRAVENOUS at 21:34

## 2020-12-18 RX ADMIN — FAMOTIDINE 20 MG: 10 INJECTION INTRAVENOUS at 08:02

## 2020-12-18 RX ADMIN — LEUCINE, PHENYLALANINE, LYSINE, METHIONINE, ISOLEUCINE, VALINE, HISTIDINE, THREONINE, TRYPTOPHAN, ALANINE, GLYCINE, ARGININE, PROLINE, SERINE, TYROSINE, SODIUM ACETATE, DIBASIC POTASSIUM PHOSPHATE, MAGNESIUM CHLORIDE, SODIUM CHLORIDE, CALCIUM CHLORIDE, DEXTROSE
311; 238; 247; 170; 255; 247; 204; 179; 77; 880; 438; 489; 289; 213; 17; 297; 261; 51; 77; 33; 5 INJECTION INTRAVENOUS at 15:17

## 2020-12-18 RX ADMIN — NITROGLYCERIN 1 INCH: 20 OINTMENT TOPICAL at 18:23

## 2020-12-18 RX ADMIN — I.V. FAT EMULSION 250 ML: 20 EMULSION INTRAVENOUS at 21:34

## 2020-12-18 RX ADMIN — LABETALOL HYDROCHLORIDE 10 MG: 5 INJECTION, SOLUTION INTRAVENOUS at 16:11

## 2020-12-18 RX ADMIN — NITROGLYCERIN 1 INCH: 20 OINTMENT TOPICAL at 13:03

## 2020-12-18 NOTE — PROGRESS NOTES
Post transfer Skin assessment done by this RN and Herman Brooke RN,No skin issues noted will continue to monitor.

## 2020-12-18 NOTE — PROGRESS NOTES
Comprehensive Nutrition Assessment    Type and Reason for Visit: Reassess(Interim)    Nutrition Recommendations/Plan:     Start Clear Liquid diet if okay'd by GI surg -  Bowel fx has returned  Rec'd SLP eval once cleared by GI/Surg    RD to add supplementation as diet advanced    Continue Standard PPN at goal of 60mL/hr, continuous  Continue 250mL 20% lipids daily  Providing 989kcal, 61g pro (Meeting ~80%kcal, 100% pro) - sufficient while awaiting PO diet advancement  Replete lytes in PN    Nutrition Assessment:  Admitted for abd pain, n/v. Dx SBO. COVID negative. F/u CT abd yesterday found NGT in jejunum, noted significant rectal impaction in rectal vault. NGT dc'd, having BMs. Currently NPOx8. On PPN since NPO day 5. MD Surg appreciative of RD recs, PPN at goal. Allowed sips of clears per RN, rec'd diet advancement to reflect this. Pt seen at bedside, follows eyes however nonverbal with provider, unable to interview. Labs: H/H: 11.1/34.4, K 3.4, BUN 36, , Ca 8.3. Meds: pepcid, zofran. Malnutrition Assessment:  Malnutrition Status:  Mild malnutrition(RD suspects higher degree of malnutrition)    Context:  Chronic illness     Findings of the 6 clinical characteristics of malnutrition:   Energy Intake:  Mild decrease in energy intake (specify)  Weight Loss:  Unable to assess     Body Fat Loss:  No significant body fat loss,     Muscle Mass Loss:  7 - Severe muscle mass loss, Calf (gastrocnemius), Temples (temporalis)  Fluid Accumulation:  No significant fluid accumulation,      Estimated Daily Nutrient Needs:  Energy (kcal): 1250 kcal (25kcal/kg); Weight Used for Energy Requirements: Current  Protein (g): 60g pro (1.2g/kg); Weight Used for Protein Requirements: Current  Fluid (ml/day): 1250mL; Method Used for Fluid Requirements: 1 ml/kcal    Nutrition Related Findings:  NFPE finding mild and severe muscle wasting. NGT dc'd. Unclear if pt with dysphagia hx. No n/v.  Continues to have BMs, x2 yesterday and x2 overnight/today w/ additional BM during RD visit. No edema. Wounds:    None       Current Nutrition Therapies:  DIET NPO  amino acid 4.25 % dextrose 5 % with electrolytes (CLINIMIX E) infusion  Current Parenteral Nutrition Orders:  · Type and Formula: Standard PPN   · Lipids: 250ml, Daily  · Duration: Continuous  · Rate/Volume: 60mL/hr  · Current PN Order Provides: 989kcal, 61g pro  · Goal PN Orders Provides: At goal    Anthropometric Measures:  · Height:  5' 2.99\" (160 cm)  · Current Body Wt:  45.1 kg (99 lb 6.8 oz)   · Admission Body Wt:  110 lb 3.7 oz(Est)    · Usual Body Wt:  (ANNIE)     · Ideal Body Wt:  115 lbs:  86.5 %   · BMI Category:  Underweight (BMI less than 22) age over 72       Nutrition Diagnosis:   · Inadequate oral intake related to altered GI function(2/2 SBO) as evidenced by NPO or clear liquid status due to medical condition, nutrition support-parenteral nutrition      Nutrition Interventions:   Food and/or Nutrient Delivery: Start oral diet, Continue parenteral nutrition  Nutrition Education and Counseling: No recommendations at this time  Coordination of Nutrition Care: Continue to monitor while inpatient, Swallow evaluation    Goals:  Meet >75% EENs via PN while NPO (progressing)  Tolerating oral diet in 7 days (not progressing)  Lytes WNL (not met)  Maintain bowel fx   (progressing)    Nutrition Monitoring and Evaluation:   Behavioral-Environmental Outcomes: None identified  Food/Nutrient Intake Outcomes: Diet advancement/tolerance, Parenteral nutrition intake/tolerance  Physical Signs/Symptoms Outcomes: Biochemical data, Chewing or swallowing, GI status, Weight    Discharge Planning:     Too soon to determine     Electronically signed by Shayne Genao on 12/18/2020 at 11:48 AM    Contact: [phone]

## 2020-12-18 NOTE — PROGRESS NOTES
Problem: Pressure Injury - Risk of  Goal: *Prevention of pressure injury  Description: Document Bubba Scale and appropriate interventions in the flowsheet. Outcome: Progressing Towards Goal  Note: Pressure Injury Interventions:  Sensory Interventions: Assess changes in LOC, Keep linens dry and wrinkle-free, Minimize linen layers    Moisture Interventions: Minimize layers, Moisture barrier, Absorbent underpads    Activity Interventions: PT/OT evaluation    Mobility Interventions: Assess need for specialty bed, Turn and reposition approx.  every two hours(pillow and wedges), HOB 30 degrees or less    Nutrition Interventions: Discuss nutritional consult with provider    Friction and Shear Interventions: Apply protective barrier, creams and emollients, Minimize layers

## 2020-12-18 NOTE — PROGRESS NOTES
Problem: Pressure Injury - Risk of  Goal: *Prevention of pressure injury  Description: Document Bubba Scale and appropriate interventions in the flowsheet. Outcome: Progressing Towards Goal  Note: Pressure Injury Interventions:  Sensory Interventions: Keep linens dry and wrinkle-free, Minimize linen layers, Assess need for specialty bed, Turn and reposition approx. every two hours (pillows and wedges if needed)    Moisture Interventions: Minimize layers, Assess need for specialty bed, Absorbent underpads    Activity Interventions: PT/OT evaluation, Assess need for specialty bed    Mobility Interventions: Assess need for specialty bed, HOB 30 degrees or less, PT/OT evaluation, Turn and reposition approx.  every two hours(pillow and wedges)    Nutrition Interventions: Discuss nutritional consult with provider    Friction and Shear Interventions: Apply protective barrier, creams and emollients, Foam dressings/transparent film/skin sealants, HOB 30 degrees or less, Minimize layers

## 2020-12-18 NOTE — PROGRESS NOTES
Progress Note    Patient: Rachna Almendarez MRN: [id number]  SSN: [id number]    YOB: 1927  Age: 80 y.o. Sex: female      Admit Date: 12/11/2020    LOS: 7 days     Subjective:   Hospital day 8 for partial small bowel obstruction  Patient CT scan yesterday demonstrates contrast from the colon  Had 2 large bowel movements overnight    Objective:     Vitals:    12/17/20 1703 12/17/20 1933 12/17/20 2135 12/18/20 0330   BP: (!) 170/83 (!) 168/82 (!) 170/82 (!) 169/90   Pulse: 80 77 78 82   Resp: 16 18 18   Temp: 97.6 °F (36.4 °C) 97.9 °F (36.6 °C)  98.6 °F (37 °C)   SpO2: 95% 98%  97%   Weight:       Height:            Intake and Output:  Current Shift: No intake/output data recorded.   Last three shifts: 12/16 1901 - 12/18 0700  In: 1494.8 [I.V.:1494.8]  Out: 300     Review of Systems:  ROS     Physical Exam:   Abdomen is soft, nontender, nondistended    Lab/Data Review:  Recent Results (from the past 24 hour(s))   GLUCOSE, POC    Collection Time: 12/18/20 12:18 AM   Result Value Ref Range    Glucose (POC) 120 (H) 65 - 100 mg/dL    Performed by Tomás Bedolla           Assessment:     Active Problems:    Small bowel obstruction ([address] (12/11/2020)        Plan:   Discontinue nasogastric tube  We will reorder labs for this a.m. since yesterday's were not drawn  Start patient on sips of clear liquids    Signed By: Gema Wang MD     December 18, 2020

## 2020-12-18 NOTE — PROGRESS NOTES
CM sent multiple [facility] referrals. CM will continue to follow patient for discharge planning needs.

## 2020-12-18 NOTE — CONSULTS
Hospitalist               Medical Consult Note: 12/18/2020      Subjective:     Patient is a 59-year-old female with history of hypertension and previous abdominal surgeries who was admitted on 12/11/2020 due to nausea vomiting and abdominal pain. KUB showed dilated loops of bowel in the mid abdomen possibly small bowel obstruction. She was started on NG tube to suction for decompression and kept n.p.o. Blood pressures found to be elevated so internal medicine invited to to assist with management of hypertension. Patient seen and examined at bedside  She is in no acute distress  No acute events overnight    Repeat CT of abdomen showed there has been a decrease in the degree of distention of the small bowel, there still is a moderate residual dilatation of the small bowel.     Problem List:  Patient Active Problem List   Diagnosis Code    Small bowel obstruction (HCC) K56.609         Medications reviewed  Current Facility-Administered Medications   Medication Dose Route Frequency    amino acid 4.25 % dextrose 5 % with electrolytes (CLINIMIX E) infusion   IntraVENous CONTINUOUS    [START ON 12/19/2020] amino acid 4.25 % dextrose 5 % with electrolytes (CLINIMIX E) infusion   IntraVENous CONTINUOUS    fat emulsion 20% (LIPOSYN, INTRAlipid) infusion 250 mL  250 mL IntraVENous CONTINUOUS    labetaloL (NORMODYNE;TRANDATE) 20 mg/4 mL (5 mg/mL) injection 10 mg  10 mg IntraVENous TID    amino acid 4.25 % dextrose 5 % with electrolytes (CLINIMIX E) infusion   IntraVENous CONTINUOUS    nitroglycerin (NITROBID) 2 % ointment 1 Inch  1 Inch Topical Q6H    0.45% sodium chloride infusion  125 mL/hr IntraVENous CONTINUOUS    acetaminophen (TYLENOL) suppository 650 mg  650 mg Rectal Q4H PRN    famotidine (PF) (PEPCID) 20 mg in 0.9% sodium chloride 10 mL injection  20 mg IntraVENous Q12H    ondansetron (ZOFRAN) injection 4 mg  4 mg IntraVENous Q6H PRN       Review of Systems:   Review of Systems   Constitutional: Negative for chills, diaphoresis, fever, malaise/fatigue and weight loss.   HENT: Negative for ear discharge, ear pain, hearing loss, nosebleeds, sinus pain and tinnitus.    Respiratory: Negative for cough, hemoptysis, sputum production, shortness of breath and wheezing.    Cardiovascular: Negative for chest pain, palpitations, orthopnea, claudication and leg swelling.   Gastrointestinal: Positive for abdominal pain. Negative for constipation, diarrhea, heartburn, nausea and vomiting.   Genitourinary: Negative for dysuria, flank pain, frequency, hematuria and urgency.   Musculoskeletal: Negative for back pain, falls, joint pain, myalgias and neck pain.   Neurological: Negative for dizziness, tingling, focal weakness, seizures, weakness and headaches.   Psychiatric/Behavioral: Negative for depression, hallucinations, memory loss, substance abuse and suicidal ideas. The patient is not nervous/anxious.            Objective:   Physical Exam  Constitutional:       General: She is not in acute distress.     Appearance: Normal appearance.   HENT:      Head: Normocephalic and atraumatic.      Mouth/Throat:      Mouth: Mucous membranes are moist.   Eyes:      Pupils: Pupils are equal, round, and reactive to light.   Neck:      Musculoskeletal: No neck rigidity.   Cardiovascular:      Rate and Rhythm: Normal rate and regular rhythm.      Pulses: Normal pulses.      Heart sounds: Normal heart sounds.   Pulmonary:      Effort: Pulmonary effort is normal. No respiratory distress.      Breath sounds: Normal breath sounds. No wheezing.   Abdominal:      General: Bowel sounds are normal.      Palpations: Abdomen is soft. There is no mass.      Tenderness: There is abdominal tenderness.   Musculoskeletal:         General: No swelling, tenderness, deformity or signs of injury.   Skin:     General: Skin is warm and dry.      Findings: No bruising or erythema.   Neurological:      General: No focal deficit  present. Mental Status: She is alert and oriented to person, place, and time. Motor: No weakness. Psychiatric:         Mood and Affect: Mood normal.          Visit Vitals  BP (!) 166/81 (BP 1 Location: Right arm, BP Patient Position: At rest)   Pulse 78   Temp 98.3 °F (36.8 °C)   Resp 18   Ht 5' 2.99\" (1.6 m)   Wt 45.1 kg (99 lb 6.8 oz)   SpO2 97%   BMI 17.62 kg/m²          Data Review:       Recent Days:  Recent Labs     12/18/20  0545 12/16/20  1140 12/15/20  2015   WBC 10.9 6.2 5.2   HGB 11.1* 10.4* 11.0*   HCT 34.4* 33.2* 35.0    324 333     Recent Labs     12/18/20  0545 12/16/20  1140 12/15/20  2015    148* 152*   K 3.4* 2.8* 3.0*   * 119* 120*   CO2 22 20* 16*   * 106* 39*   BUN 36* 23* 26*   CREA 0.84 0.87 0.80   CA 8.3* 8.4* 8.6   ALB  --   --  2.4*   TBILI  --   --  0.4   ALT  --   --  13        Past Medical History:   Diagnosis Date    Hypertension         Social History     Tobacco Use    Smoking status: Never Smoker    Smokeless tobacco: Never Used   Substance Use Topics    Alcohol use: Not Currently     Frequency: Never    Drug use: Not Currently      Assessment/Plan     1. Small bowel obstruction  Likely secondary to adhesions from previous abdominal surgeries    . Repeat CT of abdomen showed there has been a decrease in the degree of distention of the small bowel, there still is a moderate residual dilatation of the small bowel. She is on TPN for nutrition ( managed by surgery)  NG tube is now out . Sips of clears per surgery    2. Acute dehydration  Secondary to poor oral intake  Continue gentle IV rehydration     3. Hypertension  Improving  Blood pressure this morning is 166/81  Continue Nitrobid and labetalol     4. Metabolic encephalopathy  Secondary to dehydration. Mental status is closer to her baseline now  I ordered a urinalysis to rule out UTI and pending. Continue with IV fluids    CBC, BMP in a.m.   SCDs for DVT prophylaxis    Comments: Repeat CT of abdomen showed there has been a decrease in the degree of distention of the small bowel, there still is a moderate residual dilatation of the small bowel. NG tube removed today with sips of clears per surgery. Blood pressures are improving. Will continue to follow this patient with you.      Nancy Yarbrough, NP

## 2020-12-19 LAB
GLUCOSE BLD STRIP.AUTO-MCNC: 111 MG/DL (ref 65–100)
PERFORMED BY, TECHID: ABNORMAL

## 2020-12-19 PROCEDURE — 82962 GLUCOSE BLOOD TEST: CPT

## 2020-12-19 PROCEDURE — 74011250637 HC RX REV CODE- 250/637: Performed by: NURSE PRACTITIONER

## 2020-12-19 PROCEDURE — 74011000250 HC RX REV CODE- 250: Performed by: COLON & RECTAL SURGERY

## 2020-12-19 PROCEDURE — 99232 SBSQ HOSP IP/OBS MODERATE 35: CPT | Performed by: COLON & RECTAL SURGERY

## 2020-12-19 PROCEDURE — 74011000258 HC RX REV CODE- 258: Performed by: COLON & RECTAL SURGERY

## 2020-12-19 PROCEDURE — 74011250637 HC RX REV CODE- 250/637: Performed by: COLON & RECTAL SURGERY

## 2020-12-19 PROCEDURE — 65270000029 HC RM PRIVATE

## 2020-12-19 RX ORDER — POLYETHYLENE GLYCOL 3350 17 G/17G
17 POWDER, FOR SOLUTION ORAL DAILY
Status: DISCONTINUED | OUTPATIENT
Start: 2020-12-19 | End: 2020-12-31 | Stop reason: HOSPADM

## 2020-12-19 RX ORDER — FAMOTIDINE 20 MG/1
20 TABLET, FILM COATED ORAL
Status: DISCONTINUED | OUTPATIENT
Start: 2020-12-19 | End: 2020-12-31 | Stop reason: HOSPADM

## 2020-12-19 RX ORDER — FAMOTIDINE 20 MG/1
20 TABLET, FILM COATED ORAL 2 TIMES DAILY
Status: DISCONTINUED | OUTPATIENT
Start: 2020-12-19 | End: 2020-12-19

## 2020-12-19 RX ORDER — NIFEDIPINE 30 MG/1
60 TABLET, EXTENDED RELEASE ORAL DAILY
Status: DISCONTINUED | OUTPATIENT
Start: 2020-12-19 | End: 2020-12-26

## 2020-12-19 RX ADMIN — FAMOTIDINE 20 MG: 20 TABLET ORAL at 09:42

## 2020-12-19 RX ADMIN — I.V. FAT EMULSION 250 ML: 20 EMULSION INTRAVENOUS at 22:35

## 2020-12-19 RX ADMIN — ASCORBIC ACID, VITAMIN A PALMITATE, CHOLECALCIFEROL, THIAMINE HYDROCHLORIDE, RIBOFLAVIN-5 PHOSPHATE SODIUM, PYRIDOXINE HYDROCHLORIDE, NIACINAMIDE, DEXPANTHENOL, ALPHA-TOCOPHEROL ACETATE, VITAMIN K1, FOLIC ACID, BIOTIN, CYANOCOBALAMIN: 200; 3300; 200; 6; 3.6; 6; 40; 15; 10; 150; 600; 60; 5 INJECTION, SOLUTION INTRAVENOUS at 22:35

## 2020-12-19 RX ADMIN — POLYETHYLENE GLYCOL 3350 17 G: 17 POWDER, FOR SOLUTION ORAL at 10:47

## 2020-12-19 RX ADMIN — LEUCINE, PHENYLALANINE, LYSINE, METHIONINE, ISOLEUCINE, VALINE, HISTIDINE, THREONINE, TRYPTOPHAN, ALANINE, GLYCINE, ARGININE, PROLINE, SERINE, TYROSINE, SODIUM ACETATE, DIBASIC POTASSIUM PHOSPHATE, MAGNESIUM CHLORIDE, SODIUM CHLORIDE, CALCIUM CHLORIDE, DEXTROSE
311; 238; 247; 170; 255; 247; 204; 179; 77; 880; 438; 489; 289; 213; 17; 297; 261; 51; 77; 33; 5 INJECTION INTRAVENOUS at 14:41

## 2020-12-19 RX ADMIN — NITROGLYCERIN 1 INCH: 20 OINTMENT TOPICAL at 05:52

## 2020-12-19 RX ADMIN — NIFEDIPINE 60 MG: 30 TABLET, FILM COATED, EXTENDED RELEASE ORAL at 09:42

## 2020-12-19 NOTE — PROGRESS NOTES
Physician Progress Note      Antonietta Cates  CSN #:                  [id number]  :                       10/28/1927  ADMIT DATE:       2020 2:03 PM  [address] [place] DATE:  RESPONDING  PROVIDER #:        Erasmo Howe NP          QUERY TEXT:    Patient admitted with bowel obstruction. If possible, please document in progress notes and discharge summary if you are evaluating and /or treating any of the following: The medical record reflects the following:  Risk Factors: 80year old female, BMI 17.6  Clinical Indicators:  ED note - presents to the ED c/o intermittent abdominal pain that began 2 weeks ago. Pt has accompanying constipation. Vomiting, and decreased appetite, sweats, and intermittent nausea. Cachectic and frail.  H&P - small bowel obstruction likely secondary to adhesive disease. 12/15 Dietician consult -  Mild malnutrition(Possibly with higher degree of malnutrition). Albumin: 3.2, 3.1, 2.4  Treatment: TPN started     Please call [phone] with any questions  Options provided:  -- Protein calorie malnutrition mild  -- Protein calorie malnutrition moderate  -- Protein calorie malnutrition severe  -- Other - I will add my own diagnosis  -- Disagree - Not applicable / Not valid  -- Disagree - Clinically unable to determine / Unknown  -- Refer to Clinical Documentation Reviewer    PROVIDER RESPONSE TEXT:    This patient has severe protein calorie malnutrition. Query created by:  Eve Alvarenga on 2020 11:56 AM      Electronically signed by:  Erasmo Howe NP 2020 2:13 PM

## 2020-12-19 NOTE — CONSULTS
Hospitalist               Medical Consult Note: 12/19/2020      Subjective:     Patient is a 68-year-old female with history of hypertension and previous abdominal surgeries who was admitted on 12/11/2020 due to nausea, vomiting and abdominal pain. KUB showed dilated loops of bowel in the mid abdomen possibly small bowel obstruction. She was started on NG tube to suction for decompression and kept n.p.o. Blood pressures found to be elevated so internal medicine invited to to assist with management of hypertension. Patient seen and examined at bedside  She is in no acute distress  No acute events overnight    Repeat CT of abdomen showed there has been a decrease in the degree of distention of the small bowel with moderate residual dilatation of the small bowel. NG tube was removed on 12/18/2020. Problem List:  Patient Active Problem List   Diagnosis Code    Small bowel obstruction (HCC) K56.609         Medications reviewed  Current Facility-Administered Medications   Medication Dose Route Frequency    amino acid 4.25 % dextrose 5 % with electrolytes (CLINIMIX E) infusion   IntraVENous CONTINUOUS    fat emulsion 20% (LIPOSYN, INTRAlipid) infusion 250 mL  250 mL IntraVENous CONTINUOUS    famotidine (PEPCID) tablet 20 mg  20 mg Oral BID    NIFEdipine ER (PROCARDIA XL) tablet 60 mg  60 mg Oral DAILY    amino acid 4.25 % dextrose 5 % with electrolytes (CLINIMIX E) infusion   IntraVENous CONTINUOUS    amino acid 4.25 % dextrose 5 % with electrolytes (CLINIMIX E) infusion   IntraVENous CONTINUOUS    fat emulsion 20% (LIPOSYN, INTRAlipid) infusion 250 mL  250 mL IntraVENous CONTINUOUS    acetaminophen (TYLENOL) suppository 650 mg  650 mg Rectal Q4H PRN    ondansetron (ZOFRAN) injection 4 mg  4 mg IntraVENous Q6H PRN       Review of Systems:   Review of Systems   Constitutional: Negative for chills, diaphoresis, fever, malaise/fatigue and weight loss.    HENT: Negative for ear discharge, ear pain, hearing loss, nosebleeds, sinus pain and tinnitus. Respiratory: Negative for cough, hemoptysis, sputum production, shortness of breath and wheezing. Cardiovascular: Negative for chest pain, palpitations, orthopnea, claudication and leg swelling. Gastrointestinal: Positive for abdominal pain. Negative for constipation, diarrhea, heartburn, nausea and vomiting. Genitourinary: Negative for dysuria, flank pain, frequency, hematuria and urgency. Musculoskeletal: Negative for back pain, falls, joint pain, myalgias and neck pain. Neurological: Negative for dizziness, tingling, focal weakness, seizures, weakness and headaches. Psychiatric/Behavioral: Negative for depression, hallucinations, memory loss, substance abuse and suicidal ideas. The patient is not nervous/anxious. Objective:   Physical Exam  Constitutional:       General: She is not in acute distress. Appearance: Normal appearance. HENT:      Head: Normocephalic and atraumatic. Mouth/Throat:      Mouth: Mucous membranes are moist.   Eyes:      Pupils: Pupils are equal, round, and reactive to light. Neck:      Musculoskeletal: No neck rigidity. Cardiovascular:      Rate and Rhythm: Normal rate and regular rhythm. Pulses: Normal pulses. Heart sounds: Normal heart sounds. Pulmonary:      Effort: Pulmonary effort is normal. No respiratory distress. Breath sounds: Normal breath sounds. No wheezing. Abdominal:      General: Bowel sounds are normal.      Palpations: Abdomen is soft. There is no mass. Tenderness: There is abdominal tenderness. Musculoskeletal:         General: No swelling, tenderness, deformity or signs of injury. Skin:     General: Skin is warm and dry. Findings: No bruising or erythema. Neurological:      General: No focal deficit present. Mental Status: She is alert and oriented to person, place, and time. Motor: No weakness.    Psychiatric: Mood and Affect: Mood normal.          Visit Vitals  BP (!) 148/72 (BP 1 Location: Left arm, BP Patient Position: At rest)   Pulse 76   Temp 98.8 °F (37.1 °C)   Resp 18   Ht 5' 2.99\" (1.6 m)   Wt 45.5 kg (100 lb 5 oz)   SpO2 97%   BMI 17.77 kg/m²          Data Review:       Recent Days:  Recent Labs     12/18/20  0545 12/16/20  1140   WBC 10.9 6.2   HGB 11.1* 10.4*   HCT 34.4* 33.2*    324     Recent Labs     12/18/20  2110 12/18/20  0545 12/16/20  1140   NA  --  141 148*   K  --  3.4* 2.8*   CL  --  110* 119*   CO2  --  22 20*   GLU  --  111* 106*   BUN  --  36* 23*   CREA  --  0.84 0.87   CA  --  8.3* 8.4*   MG 2.1  --   --    PHOS 3.8  --   --         Past Medical History:   Diagnosis Date    Hypertension         Social History     Tobacco Use    Smoking status: Never Smoker    Smokeless tobacco: Never Used   Substance Use Topics    Alcohol use: Not Currently     Frequency: Never    Drug use: Not Currently      Assessment/Plan     1. Small bowel obstruction  Likely secondary to adhesions from previous abdominal surgeries    . Repeat CT of abdomen showed there has been a decrease in the degree of distention of the small bowel, with a moderate residual dilatation of the small bowel. She is on TPN for nutrition ( managed by surgery)  NG tube was removed on 12/18/2020  Diet advanced to full liquid today. 2.  Acute dehydration  Secondary to poor oral intake  Continue gentle IV rehydration     3. Hypertension  Blood pressures are acceptable  Blood pressure this morning is 148/72  Stop Labetalol and Nitro bid  Start Nifedipine 60 mg daily PO    4. Metabolic encephalopathy  Secondary to dehydration. She is  Now back to her baseline mental status  Not UTI  Continue with IV fluids nutrition    CBC, BMP in a.m. SCDs for DVT prophylaxis    Comments:  Repeat CT of abdomen showed there has been a decrease in the degree of distention of the small bowel.  NG tube removed on 12/18/2020 and her diet has been advanced to  Full liquid today. Will start her oral blood pressure medications today.      Funmilayo Anglin, MIMI

## 2020-12-19 NOTE — PROGRESS NOTES
Problem: Falls - Risk of  Goal: *Absence of Falls  Description: Document Faheem Parks Fall Risk and appropriate interventions in the flowsheet.   Outcome: Progressing Towards Goal  Note: Fall Risk Interventions:  Mobility Interventions: Bed/chair exit alarm, Strengthening exercises (ROM-active/passive), PT Consult for assist device competence    Mentation Interventions: Bed/chair exit alarm    Medication Interventions: Bed/chair exit alarm, Patient to call before getting OOB    Elimination Interventions: Bed/chair exit alarm, Call light in reach, Patient to call for help with toileting needs

## 2020-12-19 NOTE — PROGRESS NOTES
Progress Note    Patient: Rachna Almendarez MRN: [id number]  SSN: [id number]    YOB: 1927  Age: 80 y.o. Sex: female      Admit Date: 12/11/2020    LOS: 8 days     Subjective:   Postoperative day 9 for partial small bowel obstruction  Patient tolerating clear liquids with no nausea or vomiting  Denies abdominal pain this a.m. Objective:     Vitals:    12/18/20 1906 12/18/20 2350 12/19/20 0314 12/19/20 0745   BP: (!) 175/88 (!) 150/73 (!) 150/70 (!) 148/72   Pulse: 76 67 75 76   Resp: 18 16 18 18   Temp: 98.5 °F (36.9 °C) 97.8 °F (36.6 °C) 98.2 °F (36.8 °C) 98.8 °F (37.1 °C)   SpO2: 96% 96% 96% 97%   Weight:       Height:            Intake and Output:  Current Shift: No intake/output data recorded. Last three shifts: 12/17 1901 - 12/19 0700  In: 1494.8 [I.V.:1494.8]  Out: 400     Review of Systems:  ROS     Physical Exam:   Abdomen is soft, nontender, nondistended    Lab/Data Review:  Recent Results (from the past 24 hour(s))   MAGNESIUM    Collection Time: 12/18/20  9:10 PM   Result Value Ref Range    Magnesium 2.1 1.6 - 2.4 mg/dL   PHOSPHORUS    Collection Time: 12/18/20  9:10 PM   Result Value Ref Range    Phosphorus 3.8 2.6 - 4.7 mg/dL   GLUCOSE, POC    Collection Time: 12/18/20 11:52 PM   Result Value Ref Range    Glucose (POC) 109 (H) 65 - 100 mg/dL    Performed by [facility]           Assessment:     Active Problems:    Small bowel obstruction ([address] (12/11/2020)        Plan:   Full liquid diet  Continue with PT/OT  Repeat labs in a.m.     Signed By: Gema Wang MD     December 19, 2020

## 2020-12-20 LAB
ANION GAP SERPL CALC-SCNC: 13 MMOL/L (ref 5–15)
BASOPHILS # BLD: 0 K/UL (ref 0–0.1)
BASOPHILS NFR BLD: 0 % (ref 0–1)
BUN SERPL-MCNC: 26 MG/DL (ref 6–20)
BUN/CREAT SERPL: 38 (ref 12–20)
CA-I BLD-MCNC: 8 MG/DL (ref 8.5–10.1)
CHLORIDE SERPL-SCNC: 108 MMOL/L (ref 97–108)
CO2 SERPL-SCNC: 20 MMOL/L (ref 21–32)
CREAT SERPL-MCNC: 0.69 MG/DL (ref 0.55–1.02)
DIFFERENTIAL METHOD BLD: ABNORMAL
EOSINOPHIL # BLD: 0.4 K/UL (ref 0–0.4)
EOSINOPHIL NFR BLD: 5 % (ref 0–7)
ERYTHROCYTE [DISTWIDTH] IN BLOOD BY AUTOMATED COUNT: 14.5 % (ref 11.5–14.5)
GLUCOSE BLD STRIP.AUTO-MCNC: 105 MG/DL (ref 65–100)
GLUCOSE BLD STRIP.AUTO-MCNC: 119 MG/DL (ref 65–100)
GLUCOSE SERPL-MCNC: 98 MG/DL (ref 65–100)
HCT VFR BLD AUTO: 30.3 % (ref 35–47)
HGB BLD-MCNC: 9.6 G/DL (ref 11.5–16)
IMM GRANULOCYTES # BLD AUTO: 0 K/UL
IMM GRANULOCYTES NFR BLD AUTO: 0 %
LYMPHOCYTES # BLD: 1.9 K/UL (ref 0.8–3.5)
LYMPHOCYTES NFR BLD: 26 % (ref 12–49)
MCH RBC QN AUTO: 26.2 PG (ref 26–34)
MCHC RBC AUTO-ENTMCNC: 31.7 G/DL (ref 30–36.5)
MCV RBC AUTO: 82.6 FL (ref 80–99)
METAMYELOCYTES NFR BLD MANUAL: 3 %
MONOCYTES # BLD: 0.7 K/UL (ref 0–1)
MONOCYTES NFR BLD: 10 % (ref 5–13)
MYELOCYTES NFR BLD MANUAL: 4 %
NEUTS BAND NFR BLD MANUAL: 1 % (ref 0–6)
NEUTS SEG # BLD: 3.8 K/UL (ref 1.8–8)
NEUTS SEG NFR BLD: 51 % (ref 32–75)
PERFORMED BY, TECHID: ABNORMAL
PERFORMED BY, TECHID: ABNORMAL
PLATELET # BLD AUTO: 228 K/UL (ref 150–400)
PMV BLD AUTO: 10.9 FL (ref 8.9–12.9)
POTASSIUM SERPL-SCNC: 4 MMOL/L (ref 3.5–5.1)
RBC # BLD AUTO: 3.67 M/UL (ref 3.8–5.2)
RBC MORPH BLD: ABNORMAL
SODIUM SERPL-SCNC: 141 MMOL/L (ref 136–145)
WBC # BLD AUTO: 7.4 K/UL (ref 3.6–11)

## 2020-12-20 PROCEDURE — 74011250637 HC RX REV CODE- 250/637: Performed by: NURSE PRACTITIONER

## 2020-12-20 PROCEDURE — 80048 BASIC METABOLIC PNL TOTAL CA: CPT

## 2020-12-20 PROCEDURE — 74011000258 HC RX REV CODE- 258: Performed by: COLON & RECTAL SURGERY

## 2020-12-20 PROCEDURE — 82962 GLUCOSE BLOOD TEST: CPT

## 2020-12-20 PROCEDURE — 74011250637 HC RX REV CODE- 250/637: Performed by: COLON & RECTAL SURGERY

## 2020-12-20 PROCEDURE — 99232 SBSQ HOSP IP/OBS MODERATE 35: CPT | Performed by: COLON & RECTAL SURGERY

## 2020-12-20 PROCEDURE — 85025 COMPLETE CBC W/AUTO DIFF WBC: CPT

## 2020-12-20 PROCEDURE — 65270000029 HC RM PRIVATE

## 2020-12-20 PROCEDURE — 94760 N-INVAS EAR/PLS OXIMETRY 1: CPT

## 2020-12-20 PROCEDURE — 36415 COLL VENOUS BLD VENIPUNCTURE: CPT

## 2020-12-20 PROCEDURE — 74011000250 HC RX REV CODE- 250: Performed by: COLON & RECTAL SURGERY

## 2020-12-20 RX ADMIN — NIFEDIPINE 60 MG: 30 TABLET, FILM COATED, EXTENDED RELEASE ORAL at 08:57

## 2020-12-20 RX ADMIN — I.V. FAT EMULSION 250 ML: 20 EMULSION INTRAVENOUS at 23:02

## 2020-12-20 RX ADMIN — LEUCINE, PHENYLALANINE, LYSINE, METHIONINE, ISOLEUCINE, VALINE, HISTIDINE, THREONINE, TRYPTOPHAN, ALANINE, GLYCINE, ARGININE, PROLINE, SERINE, TYROSINE, SODIUM ACETATE, DIBASIC POTASSIUM PHOSPHATE, MAGNESIUM CHLORIDE, SODIUM CHLORIDE, CALCIUM CHLORIDE, DEXTROSE
311; 238; 247; 170; 255; 247; 204; 179; 77; 880; 438; 489; 289; 213; 17; 297; 261; 51; 77; 33; 5 INJECTION INTRAVENOUS at 14:00

## 2020-12-20 RX ADMIN — POLYETHYLENE GLYCOL 3350 17 G: 17 POWDER, FOR SOLUTION ORAL at 08:57

## 2020-12-20 RX ADMIN — ASCORBIC ACID, VITAMIN A PALMITATE, CHOLECALCIFEROL, THIAMINE HYDROCHLORIDE, RIBOFLAVIN-5 PHOSPHATE SODIUM, PYRIDOXINE HYDROCHLORIDE, NIACINAMIDE, DEXPANTHENOL, ALPHA-TOCOPHEROL ACETATE, VITAMIN K1, FOLIC ACID, BIOTIN, CYANOCOBALAMIN: 200; 3300; 200; 6; 3.6; 6; 40; 15; 10; 150; 600; 60; 5 INJECTION, SOLUTION INTRAVENOUS at 23:02

## 2020-12-20 NOTE — PROGRESS NOTES
Progress Note    Patient: Karla Alvarez MRN: [id number]  SSN: [id number]    YOB: 1927  Age: 80 y.o. Sex: female      Admit Date: 12/11/2020    LOS: 9 days     Subjective:   Hospital day 10 for partial small bowel obstruction  Apparently yesterday patient developed abdominal pain after having a small amount of pudding she is put back on clear liquid diet  This a.m. resting quietly in bed    Objective:     Vitals:    12/19/20 1500 12/19/20 2203 12/20/20 0119 12/20/20 0552   BP: 126/65 (!) 148/67 (!) 142/71 (!) 165/70   Pulse: 73 76 77 77   Resp: 18 18 20 20   Temp: 97.9 °F (36.6 °C) 98.1 °F (36.7 °C) 97.7 °F (36.5 °C) 97.9 °F (36.6 °C)   SpO2: 97% 98% 97% 99%   Weight:       Height:            Intake and Output:  Current Shift: No intake/output data recorded. Last three shifts: 12/18 1901 - 12/20 0700  In: -   Out: 900 [Urine:900]    Review of Systems:  ROS     Physical Exam:   Abdomen is soft, nondistended, patient subjectively reports pain on palpation however when she is distracted it does not appear to be tender    Lab/Data Review:  Recent Results (from the past 24 hour(s))   GLUCOSE, POC    Collection Time: 12/19/20  9:11 AM   Result Value Ref Range    Glucose (POC) 111 (H) 65 - 100 mg/dL    Performed by Jose Caldera    GLUCOSE, POC    Collection Time: 12/20/20  1:16 AM   Result Value Ref Range    Glucose (POC) 105 (H) 65 - 100 mg/dL    Performed by [address] [place] [address] pending    Assessment:     Active Problems:    Small bowel obstruction ([address] (12/11/2020)        Plan:   60-year-old female partial small bowel obstruction. On her last CT scan contrast went all the way the colon. She has had 2 large bowel movements since her CT scan. Is not clear to me whether she has had more bowel movement since those. She has not had any nausea or vomiting. Her abdomen is soft. Continue with clear liquid diet.   Follow-up a.m. labs  We will likely get a repeat CT scan tomorrow    Signed By: Pippa Lopez MD     December 20, 2020

## 2020-12-20 NOTE — CONSULTS
Hospitalist               Medical Consult Note: 12/20/2020      Subjective:     Patient is a 51-year-old female with history of hypertension and previous abdominal surgeries who was admitted on 12/11/2020 due to nausea, vomiting and abdominal pain. KUB showed dilated loops of bowel in the mid abdomen possibly small bowel obstruction. She was started on NG tube to suction for decompression and kept n.p.o. Blood pressures found to be elevated so internal medicine invited to to assist with management of hypertension. Patient seen and examined at bedside  She is in no acute distress  No acute events overnight  Abdomen is soft and she has had BMs  She was had abdominal discomfort on 12/19/2020 after trying a full liquid diet so she has been downgraded back to clear liquid    Repeat CT of abdomen showed there has been a decrease in the degree of distention of the small bowel with moderate residual dilatation of the small bowel. NG tube was removed on 12/18/2020. Problem List:  Patient Active Problem List   Diagnosis Code    Small bowel obstruction (HCC) K56.609         Medications reviewed  Current Facility-Administered Medications   Medication Dose Route Frequency    amino acid 4.25 % dextrose 5 % with electrolytes (CLINIMIX E) infusion   IntraVENous CONTINUOUS    NIFEdipine ER (PROCARDIA XL) tablet 60 mg  60 mg Oral DAILY    famotidine (PEPCID) tablet 20 mg  20 mg Oral Q48H    polyethylene glycol (MIRALAX) packet 17 g  17 g Oral DAILY    amino acid 4.25 % dextrose 5 % with electrolytes (CLINIMIX E) infusion   IntraVENous CONTINUOUS    acetaminophen (TYLENOL) suppository 650 mg  650 mg Rectal Q4H PRN    ondansetron (ZOFRAN) injection 4 mg  4 mg IntraVENous Q6H PRN       Review of Systems:   Review of Systems   Constitutional: Negative for chills, diaphoresis, fever, malaise/fatigue and weight loss.    HENT: Negative for ear discharge, ear pain, hearing loss, nosebleeds, sinus pain and tinnitus. Respiratory: Negative for cough, hemoptysis, sputum production, shortness of breath and wheezing. Cardiovascular: Negative for chest pain, palpitations, orthopnea, claudication and leg swelling. Gastrointestinal: Positive for abdominal pain. Negative for constipation, diarrhea, heartburn, nausea and vomiting. Genitourinary: Negative for dysuria, flank pain, frequency, hematuria and urgency. Musculoskeletal: Negative for back pain, falls, joint pain, myalgias and neck pain. Neurological: Negative for dizziness, tingling, focal weakness, seizures, weakness and headaches. Psychiatric/Behavioral: Negative for depression, hallucinations, memory loss, substance abuse and suicidal ideas. The patient is not nervous/anxious. Objective:   Physical Exam  Constitutional:       General: She is not in acute distress. Appearance: Normal appearance. HENT:      Head: Normocephalic and atraumatic. Mouth/Throat:      Mouth: Mucous membranes are moist.   Eyes:      Pupils: Pupils are equal, round, and reactive to light. Neck:      Musculoskeletal: No neck rigidity. Cardiovascular:      Rate and Rhythm: Normal rate and regular rhythm. Pulses: Normal pulses. Heart sounds: Normal heart sounds. Pulmonary:      Effort: Pulmonary effort is normal. No respiratory distress. Breath sounds: Normal breath sounds. No wheezing. Abdominal:      General: Bowel sounds are normal.      Palpations: Abdomen is soft. There is no mass. Tenderness: There is abdominal tenderness. Musculoskeletal:         General: No swelling, tenderness, deformity or signs of injury. Skin:     General: Skin is warm and dry. Findings: No bruising or erythema. Neurological:      General: No focal deficit present. Mental Status: She is alert and oriented to person, place, and time. Motor: No weakness.    Psychiatric:         Mood and Affect: Mood normal.          Visit Vitals  /66 (BP 1 Location: Right arm, BP Patient Position: At rest)   Pulse 91   Temp 98 °F (36.7 °C)   Resp 20   Ht 5' 2.99\" (1.6 m)   Wt 45.5 kg (100 lb 5 oz)   SpO2 97%   BMI 17.77 kg/m²          Data Review:       Recent Days:  Recent Labs     12/20/20  0557 12/18/20  0545   WBC 7.4 10.9   HGB 9.6* 11.1*   HCT 30.3* 34.4*    256     Recent Labs     12/20/20  0557 12/18/20  2110 12/18/20  0545     --  141   K 4.0  --  3.4*     --  110*   CO2 20*  --  22   GLU 98  --  111*   BUN 26*  --  36*   CREA 0.69  --  0.84   CA 8.0*  --  8.3*   MG  --  2.1  --    PHOS  --  3.8  --         Past Medical History:   Diagnosis Date    Hypertension         Social History     Tobacco Use    Smoking status: Never Smoker    Smokeless tobacco: Never Used   Substance Use Topics    Alcohol use: Not Currently     Frequency: Never    Drug use: Not Currently      Assessment/Plan     1. Small bowel obstruction  Likely secondary to adhesions from previous abdominal surgeries    . Repeat CT of abdomen showed there has been a decrease in the degree of distention of the small bowel, with a moderate residual dilatation of the small bowel. She is on TPN for nutrition ( managed by surgery)  NG tube was removed on 12/18/2020  She was had abdominal discomfort on 12/19/2020 after trying a full liquid diet so she has been downgraded back to clear liquids    2. Acute dehydration  Secondary to poor oral intake  Continue gentle IV rehydration     3. Hypertension  Blood pressures are acceptable  Blood pressure this morning is 130/66  Continue Nifedipine 60 mg daily PO    4. Metabolic encephalopathy  Secondary to dehydration. She is  now back to her baseline mental status  Continue with IV fluids nutrition    CBC, BMP in a.m. SCDs for DVT prophylaxis    Comments:  Repeat CT of abdomen showed there has been a decrease in the degree of distention of the small bowel. NG tube removed on 12/18/2020.  She was had abdominal discomfort on 12/19/2020 after trying a full liquid diet so she has been downgraded back to clear liquid. Blood pressures are  now acceptable.      Deepti Michelle NP

## 2020-12-21 ENCOUNTER — APPOINTMENT (OUTPATIENT)
Dept: GENERAL RADIOLOGY | Age: 85
DRG: 388 | End: 2020-12-21
Attending: COLON & RECTAL SURGERY
Payer: MEDICARE

## 2020-12-21 LAB
ANION GAP SERPL CALC-SCNC: 5 MMOL/L (ref 5–15)
BASOPHILS # BLD: 0 K/UL (ref 0–0.1)
BASOPHILS NFR BLD: 0 % (ref 0–1)
BUN SERPL-MCNC: 25 MG/DL (ref 6–20)
BUN/CREAT SERPL: 42 (ref 12–20)
CA-I BLD-MCNC: 8 MG/DL (ref 8.5–10.1)
CHLORIDE SERPL-SCNC: 106 MMOL/L (ref 97–108)
CO2 SERPL-SCNC: 27 MMOL/L (ref 21–32)
CREAT SERPL-MCNC: 0.59 MG/DL (ref 0.55–1.02)
DIFFERENTIAL METHOD BLD: ABNORMAL
EOSINOPHIL # BLD: 0.1 K/UL (ref 0–0.4)
EOSINOPHIL NFR BLD: 1 % (ref 0–7)
ERYTHROCYTE [DISTWIDTH] IN BLOOD BY AUTOMATED COUNT: 14.6 % (ref 11.5–14.5)
GLUCOSE BLD STRIP.AUTO-MCNC: 103 MG/DL (ref 65–100)
GLUCOSE BLD STRIP.AUTO-MCNC: 110 MG/DL (ref 65–100)
GLUCOSE BLD STRIP.AUTO-MCNC: 114 MG/DL (ref 65–100)
GLUCOSE BLD STRIP.AUTO-MCNC: 137 MG/DL (ref 65–100)
GLUCOSE SERPL-MCNC: 101 MG/DL (ref 65–100)
HCT VFR BLD AUTO: 30.3 % (ref 35–47)
HGB BLD-MCNC: 9.9 G/DL (ref 11.5–16)
IMM GRANULOCYTES # BLD AUTO: 0.3 K/UL (ref 0–0.04)
IMM GRANULOCYTES NFR BLD AUTO: 4 % (ref 0–0.5)
LYMPHOCYTES # BLD: 0.9 K/UL (ref 0.8–3.5)
LYMPHOCYTES NFR BLD: 11 % (ref 12–49)
MAGNESIUM SERPL-MCNC: 2.3 MG/DL (ref 1.6–2.4)
MCH RBC QN AUTO: 26.7 PG (ref 26–34)
MCHC RBC AUTO-ENTMCNC: 32.7 G/DL (ref 30–36.5)
MCV RBC AUTO: 81.7 FL (ref 80–99)
MONOCYTES # BLD: 0.7 K/UL (ref 0–1)
MONOCYTES NFR BLD: 9 % (ref 5–13)
NEUTS SEG # BLD: 6 K/UL (ref 1.8–8)
NEUTS SEG NFR BLD: 78 % (ref 32–75)
PERFORMED BY, TECHID: ABNORMAL
PHOSPHATE SERPL-MCNC: 3.7 MG/DL (ref 2.6–4.7)
PLATELET # BLD AUTO: 242 K/UL (ref 150–400)
PMV BLD AUTO: 10.5 FL (ref 8.9–12.9)
POTASSIUM SERPL-SCNC: 4.3 MMOL/L (ref 3.5–5.1)
RBC # BLD AUTO: 3.71 M/UL (ref 3.8–5.2)
SODIUM SERPL-SCNC: 138 MMOL/L (ref 136–145)
WBC # BLD AUTO: 7.7 K/UL (ref 3.6–11)

## 2020-12-21 PROCEDURE — 74011250637 HC RX REV CODE- 250/637: Performed by: NURSE PRACTITIONER

## 2020-12-21 PROCEDURE — 85025 COMPLETE CBC W/AUTO DIFF WBC: CPT

## 2020-12-21 PROCEDURE — 74011250637 HC RX REV CODE- 250/637: Performed by: COLON & RECTAL SURGERY

## 2020-12-21 PROCEDURE — 74011000250 HC RX REV CODE- 250: Performed by: COLON & RECTAL SURGERY

## 2020-12-21 PROCEDURE — 74011000258 HC RX REV CODE- 258: Performed by: COLON & RECTAL SURGERY

## 2020-12-21 PROCEDURE — 74011000258 HC RX REV CODE- 258: Performed by: NURSE PRACTITIONER

## 2020-12-21 PROCEDURE — 80048 BASIC METABOLIC PNL TOTAL CA: CPT

## 2020-12-21 PROCEDURE — 82962 GLUCOSE BLOOD TEST: CPT

## 2020-12-21 PROCEDURE — 74011000250 HC RX REV CODE- 250: Performed by: NURSE PRACTITIONER

## 2020-12-21 PROCEDURE — 99232 SBSQ HOSP IP/OBS MODERATE 35: CPT | Performed by: COLON & RECTAL SURGERY

## 2020-12-21 PROCEDURE — 84100 ASSAY OF PHOSPHORUS: CPT

## 2020-12-21 PROCEDURE — 74011250636 HC RX REV CODE- 250/636: Performed by: NURSE PRACTITIONER

## 2020-12-21 PROCEDURE — 36415 COLL VENOUS BLD VENIPUNCTURE: CPT

## 2020-12-21 PROCEDURE — 65270000029 HC RM PRIVATE

## 2020-12-21 PROCEDURE — 83735 ASSAY OF MAGNESIUM: CPT

## 2020-12-21 PROCEDURE — 74018 RADEX ABDOMEN 1 VIEW: CPT

## 2020-12-21 RX ORDER — LABETALOL HCL 20 MG/4 ML
10 SYRINGE (ML) INTRAVENOUS ONCE
Status: COMPLETED | OUTPATIENT
Start: 2020-12-21 | End: 2020-12-21

## 2020-12-21 RX ORDER — LABETALOL HCL 20 MG/4 ML
10 SYRINGE (ML) INTRAVENOUS
Status: DISCONTINUED | OUTPATIENT
Start: 2020-12-21 | End: 2020-12-31 | Stop reason: HOSPADM

## 2020-12-21 RX ADMIN — POLYETHYLENE GLYCOL 3350 17 G: 17 POWDER, FOR SOLUTION ORAL at 09:12

## 2020-12-21 RX ADMIN — LEUCINE, PHENYLALANINE, LYSINE, METHIONINE, ISOLEUCINE, VALINE, HISTIDINE, THREONINE, TRYPTOPHAN, ALANINE, GLYCINE, ARGININE, PROLINE, SERINE, TYROSINE, SODIUM ACETATE, DIBASIC POTASSIUM PHOSPHATE, MAGNESIUM CHLORIDE, SODIUM CHLORIDE, CALCIUM CHLORIDE, DEXTROSE
311; 238; 247; 170; 255; 247; 204; 179; 77; 880; 438; 489; 289; 213; 17; 297; 261; 51; 77; 33; 5 INJECTION INTRAVENOUS at 14:58

## 2020-12-21 RX ADMIN — NIFEDIPINE 60 MG: 30 TABLET, FILM COATED, EXTENDED RELEASE ORAL at 09:11

## 2020-12-21 RX ADMIN — LABETALOL HYDROCHLORIDE 10 MG: 5 INJECTION, SOLUTION INTRAVENOUS at 14:58

## 2020-12-21 RX ADMIN — LABETALOL HYDROCHLORIDE 10 MG: 5 INJECTION, SOLUTION INTRAVENOUS at 09:12

## 2020-12-21 RX ADMIN — I.V. FAT EMULSION 250 ML: 20 EMULSION INTRAVENOUS at 21:35

## 2020-12-21 RX ADMIN — ASCORBIC ACID, VITAMIN A PALMITATE, CHOLECALCIFEROL, THIAMINE HYDROCHLORIDE, RIBOFLAVIN-5 PHOSPHATE SODIUM, PYRIDOXINE HYDROCHLORIDE, NIACINAMIDE, DEXPANTHENOL, ALPHA-TOCOPHEROL ACETATE, VITAMIN K1, FOLIC ACID, BIOTIN, CYANOCOBALAMIN: 200; 3300; 200; 6; 3.6; 6; 40; 15; 10; 150; 600; 60; 5 INJECTION, SOLUTION INTRAVENOUS at 21:35

## 2020-12-21 RX ADMIN — FAMOTIDINE 20 MG: 20 TABLET ORAL at 09:11

## 2020-12-21 NOTE — PROGRESS NOTES
CM spoke to son Feliciano Cesar) at bedside. Son reported that he thinks the patient will need rehab after hospital discharge due to the patient being off her feet for so long. Son preffered IRF because he thought that an IRF would have lower Covid cases in house compared to a SNF. Son signed a choicefor SNF and IRF. CM sent referrals to [facility] and [facility].     CM will continue to follow

## 2020-12-21 NOTE — PROGRESS NOTES
Progress Note    Patient: Bernadette Batista MRN: [id number]  SSN: [id number]    YOB: 1927  Age: 80 y.o. Sex: female      Admit Date: 12/11/2020    LOS: 10 days     Subjective:   Hospital day 11 for partial small bowel obstruction  Patient seen in bed, resting comfortably  Denies abdominal pain    Objective:     Vitals:    12/20/20 1116 12/20/20 1554 12/20/20 1957 12/21/20 0014   BP: 130/66 (!) 148/79 (!) 142/71 (!) 162/79   Pulse: 91 87 93 83   Resp: 20 20 20 20   Temp: 98 °F (36.7 °C) 97.9 °F (36.6 °C) 98 °F (36.7 °C) 97.5 °F (36.4 °C)   SpO2: 97% 100% 97% 99%   Weight:  100 lb 5 oz (45.5 kg)     Height:            Intake and Output:  Current Shift: No intake/output data recorded. Last three shifts: 12/19 1901 - 12/21 0700  In: 623.3 [I.V.:623.3]  Out: 1600 [Urine:1600]    Review of Systems:  ROS     Physical Exam:   Abdomen is soft, nondistended, nontender    Lab/Data Review:  Recent Results (from the past 24 hour(s))   GLUCOSE, POC    Collection Time: 12/20/20  8:56 AM   Result Value Ref Range    Glucose (POC) 119 (H) 65 - 100 mg/dL    Performed by Ashley Viramontes    GLUCOSE, POC    Collection Time: 12/21/20  1:06 AM   Result Value Ref Range    Glucose (POC) 103 (H) 65 - 100 mg/dL    Performed by FLAQUITA ALVAREZ    CBC WITH AUTOMATED DIFF    Collection Time: 12/21/20  7:10 AM   Result Value Ref Range    WBC 7.7 3.6 - 11.0 K/uL    RBC 3.71 (L) 3.80 - 5.20 M/uL    HGB 9.9 (L) 11.5 - 16.0 g/dL    HCT 30.3 (L) 35.0 - 47.0 %    MCV 81.7 80.0 - 99.0 FL    MCH 26.7 26.0 - 34.0 PG    MCHC 32.7 30.0 - 36.5 g/dL    RDW 14.6 (H) 11.5 - 14.5 %    PLATELET 426 781 - 442 K/uL    MPV 10.5 8.9 - 12.9 FL    NEUTROPHILS 78 (H) 32 - 75 %    LYMPHOCYTES 11 (L) 12 - 49 %    MONOCYTES 9 5 - 13 %    EOSINOPHILS 1 0 - 7 %    BASOPHILS 0 0 - 1 %    IMMATURE GRANULOCYTES 4 (H) 0.0 - 0.5 %    ABS. NEUTROPHILS 6.0 1.8 - 8.0 K/UL    ABS. LYMPHOCYTES 0.9 0.8 - 3.5 K/UL    ABS. MONOCYTES 0.7 0.0 - 1.0 K/UL    ABS. EOSINOPHILS 0.1 0.0 - 0.4 K/UL    ABS. BASOPHILS 0.0 0.0 - 0.1 K/UL    ABS. IMM.  GRANS. 0.3 (H) 0.00 - 0.04 K/UL    DF AUTOMATED            Assessment:     Active Problems:    Small bowel obstruction ([address] [address] (12/11/2020)        Plan:   Nasogastric tube removed 3 days ago, patient has had no vomiting  Abdomen remains soft and nondistended  Difficult to gauge whether or not the patient is passing flatus will order a KUB of the abdomen  Blood work is all within normal  Start full liquids    Signed By: Phong Ridley MD     December 21, 2020

## 2020-12-21 NOTE — CONSULTS
Hospitalist               Medical Consult Note: 12/21/2020      Subjective:     Patient is a 19-year-old female with history of hypertension and previous abdominal surgeries who was admitted on 12/11/2020 due to nausea, vomiting and abdominal pain. KUB showed dilated loops of bowel in the mid abdomen possibly small bowel obstruction. She was started on NG tube to suction for decompression and kept n.p.o. Blood pressures found to be elevated so internal medicine invited to to assist with management of hypertension. Patient seen and examined at bedside  She is in no acute distress  No acute events overnight  Abdomen is soft and she has had BMs  Full liquids started today    Repeat CT of abdomen showed there has been a decrease in the degree of distention of the small bowel with moderate residual dilatation of the small bowel. NG tube was removed on 12/18/2020.      Problem List:  Patient Active Problem List   Diagnosis Code    Small bowel obstruction (HCC) K56.609         Medications reviewed  Current Facility-Administered Medications   Medication Dose Route Frequency    amino acid 4.25 % dextrose 5 % with electrolytes (CLINIMIX E) infusion   IntraVENous CONTINUOUS    fat emulsion 20% (LIPOSYN, INTRAlipid) infusion 250 mL  250 mL IntraVENous CONTINUOUS    labetaloL (NORMODYNE;TRANDATE) 20 mg/4 mL (5 mg/mL) injection 10 mg  10 mg IntraVENous TID PRN    amino acid 4.25 % dextrose 5 % with electrolytes (CLINIMIX E) infusion   IntraVENous CONTINUOUS    amino acid 4.25 % dextrose 5 % with electrolytes (CLINIMIX E) infusion   IntraVENous CONTINUOUS    NIFEdipine ER (PROCARDIA XL) tablet 60 mg  60 mg Oral DAILY    famotidine (PEPCID) tablet 20 mg  20 mg Oral Q48H    polyethylene glycol (MIRALAX) packet 17 g  17 g Oral DAILY    acetaminophen (TYLENOL) suppository 650 mg  650 mg Rectal Q4H PRN    ondansetron (ZOFRAN) injection 4 mg  4 mg IntraVENous Q6H PRN       Review of Systems: Review of Systems   Constitutional: Negative for chills, diaphoresis, fever, malaise/fatigue and weight loss. HENT: Negative for ear discharge, ear pain, hearing loss, nosebleeds, sinus pain and tinnitus. Respiratory: Negative for cough, hemoptysis, sputum production, shortness of breath and wheezing. Cardiovascular: Negative for chest pain, palpitations, orthopnea, claudication and leg swelling. Gastrointestinal: Negative for abdominal pain, constipation, diarrhea, heartburn, nausea and vomiting. Genitourinary: Negative for dysuria, flank pain, frequency, hematuria and urgency. Musculoskeletal: Negative for back pain, falls, joint pain, myalgias and neck pain. Neurological: Negative for dizziness, tingling, focal weakness, seizures, weakness and headaches. Psychiatric/Behavioral: Negative for depression, hallucinations, memory loss, substance abuse and suicidal ideas. The patient is not nervous/anxious. Objective:   Physical Exam  Constitutional:       General: She is not in acute distress. Appearance: Normal appearance. HENT:      Head: Normocephalic and atraumatic. Mouth/Throat:      Mouth: Mucous membranes are moist.   Eyes:      Pupils: Pupils are equal, round, and reactive to light. Neck:      Musculoskeletal: No neck rigidity. Cardiovascular:      Rate and Rhythm: Normal rate and regular rhythm. Pulses: Normal pulses. Heart sounds: Normal heart sounds. Pulmonary:      Effort: Pulmonary effort is normal. No respiratory distress. Breath sounds: Normal breath sounds. No wheezing. Abdominal:      General: Bowel sounds are normal.      Palpations: Abdomen is soft. There is no mass. Tenderness: There is no abdominal tenderness. Musculoskeletal:         General: No swelling, tenderness, deformity or signs of injury. Skin:     General: Skin is warm and dry. Findings: No bruising or erythema.    Neurological:      General: No focal deficit present. Mental Status: She is alert and oriented to person, place, and time. Motor: No weakness. Psychiatric:         Mood and Affect: Mood normal.          Visit Vitals  BP (!) 198/89 (BP 1 Location: Right arm, BP Patient Position: At rest)   Pulse 76   Temp 98.1 °F (36.7 °C)   Resp 20   Ht 5' 2.99\" (1.6 m)   Wt 45.5 kg (100 lb 5 oz)   SpO2 99%   BMI 17.77 kg/m²          Data Review:       Recent Days:  Recent Labs     12/21/20  0710 12/20/20  0557   WBC 7.7 7.4   HGB 9.9* 9.6*   HCT 30.3* 30.3*    228     Recent Labs     12/21/20  0710 12/20/20  0557 12/18/20  2110    141  --    K 4.3 4.0  --     108  --    CO2 27 20*  --    * 98  --    BUN 25* 26*  --    CREA 0.59 0.69  --    CA 8.0* 8.0*  --    MG 2.3  --  2.1   PHOS 3.7  --  3.8        Past Medical History:   Diagnosis Date    Hypertension         Social History     Tobacco Use    Smoking status: Never Smoker    Smokeless tobacco: Never Used   Substance Use Topics    Alcohol use: Not Currently     Frequency: Never    Drug use: Not Currently      Assessment/Plan     1. Small bowel obstruction  Likely secondary to adhesions from previous abdominal surgeries    Repeat CT of abdomen showed there has been a decrease in the degree of distention of the small bowel, with a moderate residual dilatation of the small bowel. She is on TPN for nutrition ( managed by surgery)  NG tube was removed on 12/18/2020  Full liquid diet started today    2. Acute dehydration  Secondary to poor oral intake  Continue gentle IV rehydration     3. Hypertension  Overall blood pressures have been acceptable  Although with some episodes of uncontrolled blood pressures  Will continue Nifedipine 60 mg daily PO for now  Continue with Labetalol 10 mg TID PRN for systolic blood pressures >260    4. Metabolic encephalopathy  Secondary to dehydration.   She is  now back to her baseline mental status  Continue with IV fluids and nutrition    CBC, BMP in a.m. SCDs for DVT prophylaxis    Comments:  Repeat CT of abdomen showed there has been a decrease in the degree of distention of the small bowel. NG tube removed on 12/18/2020. She is now on a full liquid diet and blood pressures are generally acceptable.      Nancy De Los Santos NP

## 2020-12-22 LAB
ALBUMIN SERPL-MCNC: 2.4 G/DL (ref 3.5–5)
ALBUMIN/GLOB SERPL: 0.7 {RATIO} (ref 1.1–2.2)
ALP SERPL-CCNC: 69 U/L (ref 45–117)
ALT SERPL-CCNC: 41 U/L (ref 12–78)
ANION GAP SERPL CALC-SCNC: 4 MMOL/L (ref 5–15)
AST SERPL W P-5'-P-CCNC: 30 U/L (ref 15–37)
BASOPHILS # BLD: 0 K/UL (ref 0–0.1)
BASOPHILS NFR BLD: 0 % (ref 0–1)
BILIRUB SERPL-MCNC: 0.4 MG/DL (ref 0.2–1)
BUN SERPL-MCNC: 30 MG/DL (ref 6–20)
BUN/CREAT SERPL: 43 (ref 12–20)
CA-I BLD-MCNC: 8.4 MG/DL (ref 8.5–10.1)
CHLORIDE SERPL-SCNC: 105 MMOL/L (ref 97–108)
CO2 SERPL-SCNC: 28 MMOL/L (ref 21–32)
CREAT SERPL-MCNC: 0.69 MG/DL (ref 0.55–1.02)
DIFFERENTIAL METHOD BLD: ABNORMAL
EOSINOPHIL # BLD: 0.1 K/UL (ref 0–0.4)
EOSINOPHIL NFR BLD: 1 % (ref 0–7)
ERYTHROCYTE [DISTWIDTH] IN BLOOD BY AUTOMATED COUNT: 14.7 % (ref 11.5–14.5)
GLOBULIN SER CALC-MCNC: 3.6 G/DL (ref 2–4)
GLUCOSE BLD STRIP.AUTO-MCNC: 106 MG/DL (ref 65–100)
GLUCOSE BLD STRIP.AUTO-MCNC: 111 MG/DL (ref 65–100)
GLUCOSE BLD STRIP.AUTO-MCNC: 122 MG/DL (ref 65–100)
GLUCOSE BLD STRIP.AUTO-MCNC: 130 MG/DL (ref 65–100)
GLUCOSE SERPL-MCNC: 106 MG/DL (ref 65–100)
HCT VFR BLD AUTO: 32 % (ref 35–47)
HGB BLD-MCNC: 10.4 G/DL (ref 11.5–16)
IMM GRANULOCYTES # BLD AUTO: 0.1 K/UL (ref 0–0.04)
IMM GRANULOCYTES NFR BLD AUTO: 1 % (ref 0–0.5)
LYMPHOCYTES # BLD: 0.8 K/UL (ref 0.8–3.5)
LYMPHOCYTES NFR BLD: 10 % (ref 12–49)
MCH RBC QN AUTO: 26.7 PG (ref 26–34)
MCHC RBC AUTO-ENTMCNC: 32.5 G/DL (ref 30–36.5)
MCV RBC AUTO: 82.1 FL (ref 80–99)
MONOCYTES # BLD: 0.7 K/UL (ref 0–1)
MONOCYTES NFR BLD: 9 % (ref 5–13)
NEUTS SEG # BLD: 6.3 K/UL (ref 1.8–8)
NEUTS SEG NFR BLD: 79 % (ref 32–75)
PERFORMED BY, TECHID: ABNORMAL
PLATELET # BLD AUTO: 263 K/UL (ref 150–400)
PMV BLD AUTO: 10.3 FL (ref 8.9–12.9)
POTASSIUM SERPL-SCNC: 4.5 MMOL/L (ref 3.5–5.1)
PROT SERPL-MCNC: 6 G/DL (ref 6.4–8.2)
RBC # BLD AUTO: 3.9 M/UL (ref 3.8–5.2)
SODIUM SERPL-SCNC: 137 MMOL/L (ref 136–145)
TROPONIN I SERPL-MCNC: <0.05 NG/ML
WBC # BLD AUTO: 7.8 K/UL (ref 3.6–11)

## 2020-12-22 PROCEDURE — 85025 COMPLETE CBC W/AUTO DIFF WBC: CPT

## 2020-12-22 PROCEDURE — 93005 ELECTROCARDIOGRAM TRACING: CPT

## 2020-12-22 PROCEDURE — 74011000258 HC RX REV CODE- 258: Performed by: SURGERY

## 2020-12-22 PROCEDURE — 74011250637 HC RX REV CODE- 250/637: Performed by: NURSE PRACTITIONER

## 2020-12-22 PROCEDURE — 74011000258 HC RX REV CODE- 258: Performed by: COLON & RECTAL SURGERY

## 2020-12-22 PROCEDURE — 84484 ASSAY OF TROPONIN QUANT: CPT

## 2020-12-22 PROCEDURE — 80053 COMPREHEN METABOLIC PANEL: CPT

## 2020-12-22 PROCEDURE — 97162 PT EVAL MOD COMPLEX 30 MIN: CPT

## 2020-12-22 PROCEDURE — 82962 GLUCOSE BLOOD TEST: CPT

## 2020-12-22 PROCEDURE — 97165 OT EVAL LOW COMPLEX 30 MIN: CPT

## 2020-12-22 PROCEDURE — 74011250637 HC RX REV CODE- 250/637: Performed by: COLON & RECTAL SURGERY

## 2020-12-22 PROCEDURE — 97530 THERAPEUTIC ACTIVITIES: CPT

## 2020-12-22 PROCEDURE — 36415 COLL VENOUS BLD VENIPUNCTURE: CPT

## 2020-12-22 PROCEDURE — 65270000029 HC RM PRIVATE

## 2020-12-22 PROCEDURE — 74011000250 HC RX REV CODE- 250: Performed by: SURGERY

## 2020-12-22 RX ADMIN — ACETAMINOPHEN 650 MG: 650 SUPPOSITORY RECTAL at 18:07

## 2020-12-22 RX ADMIN — NIFEDIPINE 60 MG: 30 TABLET, FILM COATED, EXTENDED RELEASE ORAL at 09:21

## 2020-12-22 RX ADMIN — I.V. FAT EMULSION 250 ML: 20 EMULSION INTRAVENOUS at 21:43

## 2020-12-22 RX ADMIN — POLYETHYLENE GLYCOL 3350 17 G: 17 POWDER, FOR SOLUTION ORAL at 09:21

## 2020-12-22 RX ADMIN — LEUCINE, PHENYLALANINE, LYSINE, METHIONINE, ISOLEUCINE, VALINE, HISTIDINE, THREONINE, TRYPTOPHAN, ALANINE, GLYCINE, ARGININE, PROLINE, SERINE, TYROSINE, SODIUM ACETATE, DIBASIC POTASSIUM PHOSPHATE, MAGNESIUM CHLORIDE, SODIUM CHLORIDE, CALCIUM CHLORIDE, DEXTROSE
311; 238; 247; 170; 255; 247; 204; 179; 77; 880; 438; 489; 289; 213; 17; 297; 261; 51; 77; 33; 5 INJECTION INTRAVENOUS at 15:35

## 2020-12-22 RX ADMIN — ASCORBIC ACID, VITAMIN A PALMITATE, CHOLECALCIFEROL, THIAMINE HYDROCHLORIDE, RIBOFLAVIN-5 PHOSPHATE SODIUM, PYRIDOXINE HYDROCHLORIDE, NIACINAMIDE, DEXPANTHENOL, ALPHA-TOCOPHEROL ACETATE, VITAMIN K1, FOLIC ACID, BIOTIN, CYANOCOBALAMIN: 200; 3300; 200; 6; 3.6; 6; 40; 15; 10; 150; 600; 60; 5 INJECTION, SOLUTION INTRAVENOUS at 21:43

## 2020-12-22 NOTE — PROGRESS NOTES
Progress Note  Date:2020       Room:[facility]  Patient Monica Diaz     YOB: 1927     Age:89 y.o. Subjective    Subjective:  Symptoms:  Stable. She reports chest pain, weakness and anorexia. No shortness of breath or cough. Diet:  Poor intake. No nausea or vomiting. Activity level: Impaired due to weakness. Pain:  She reports no pain. Review of Systems   Constitutional: Negative for chills and fever. Respiratory: Negative for cough, shortness of breath, wheezing and stridor. Cardiovascular: Positive for chest pain. Gastrointestinal: Positive for anorexia. Negative for abdominal pain, nausea and vomiting. Endocrine: Negative. Genitourinary: Negative. Skin: Negative. Neurological: Positive for weakness. Objective         Vitals Last 24 Hours:  TEMPERATURE:  Temp  Av.7 °F (37.1 °C)  Min: 98 °F (36.7 °C)  Max: 99.3 °F (37.4 °C)  RESPIRATIONS RANGE: Resp  Av  Min: 16  Max: 20  PULSE OXIMETRY RANGE: SpO2  Av.8 %  Min: 97 %  Max: 100 %  PULSE RANGE: Pulse  Av.3  Min: 76  Max: 93  BLOOD PRESSURE RANGE: Systolic (73WIZ), FTO:055 , Min:123 , FVL:945   ; Diastolic (49REB), UZH:92, Min:68, Max:87    I/O (24Hr): Intake/Output Summary (Last 24 hours) at 2020 1150  Last data filed at 2020 0245  Gross per 24 hour   Intake 1124.62 ml   Output    Net 1124.62 ml     Objective:  General Appearance:  Comfortable and in no acute distress. Vital signs: (most recent): Blood pressure 123/68, pulse 91, temperature 98 °F (36.7 °C), resp. rate 16, height 5' 2.99\" (1.6 m), weight 45.5 kg (100 lb 5 oz), SpO2 98 %. Vital signs are normal.  No fever. Output: Producing urine. Lungs:  Normal effort and normal respiratory rate. Breath sounds clear to auscultation. She is not in respiratory distress. Heart: Normal rate. Regular rhythm. No murmur, gallop or friction rub.    Abdomen: Abdomen is soft and non-distended. Bowel sounds are normal.   There is no abdominal tenderness. Pulses: Distal pulses are intact. Neurological: Patient is alert. Skin:  Warm and dry. Labs/Imaging/Diagnostics    Labs:  CBC:  Recent Labs     12/21/20  0710 12/20/20  0557   WBC 7.7 7.4   RBC 3.71* 3.67*   HGB 9.9* 9.6*   HCT 30.3* 30.3*   MCV 81.7 82.6   RDW 14.6* 14.5    228     CHEMISTRIES:  Recent Labs     12/21/20  0710 12/20/20  0557    141   K 4.3 4.0    108   CO2 27 20*   BUN 25* 26*   CA 8.0* 8.0*   PHOS 3.7  --    MG 2.3  --    PT/INR:No results for input(s): INR, INREXT in the last 72 hours. No lab exists for component: PROTIME  APTT:No results for input(s): APTT in the last 72 hours. LIVER PROFILE:No results for input(s): AST, ALT in the last 72 hours. No lab exists for component: DELFINA TejadaPHOS  Lab Results   Component Value Date/Time    ALT (SGPT) 13 12/15/2020 08:15 PM    AST (SGOT) 17 12/15/2020 08:15 PM    Alk. phosphatase 61 12/15/2020 08:15 PM    Bilirubin, total 0.4 12/15/2020 08:15 PM       Imaging Last 24 Hours:  No results found. Assessment//Plan   Active Problems:    Small bowel obstruction ([address] (12/11/2020)    Patient seen and examined. Patient denies abdominal pain, nausea, or vomiting. Liquid diet ordered yesterday, but patient does not feel like eating. PPN continues. Labs and rads reviewed. KUB from yesterday demonstrates stool and gas in the large bowel. There are nondistended gas-filled loops of small bowel. SBO appears to be resolving. However, will be difficult to fully assess gut activity until patient is taking adequate po. Continue to encourage. No indication for surgery at this time. Continue discharge planning, likely to inpatient rehab. Patient also complains of new onset chest pain. STAT EKG and labs ordered, cardiology consulted. Will await their assessment and recommendation.      Electronically signed by Sarmad Pretty DO on 12/22/2020 at 11:50 AM

## 2020-12-22 NOTE — PROGRESS NOTES
Comprehensive Nutrition Assessment    Type and Reason for Visit: Reassess(Goal)    Nutrition Recommendations/Plan:     Continue Full Liquids  Continue Ensure Compact TID  Rec'd SLP eval once cleared by GI/Surg for advancement to solids     RD to modify supplementation as appropriate     Continue Standard PPN at goal of 60mL/hr, continuous  Continue 250mL 20% lipids daily  Providing 989kcal, 61g pro (Meeting ~78%kcal, >100% pro) - sufficient while awaiting diet advancement        Taper down PPN as diet advanced and/or intakes improved on FL    Obtain updated triglyceride lab  Nursing to document %meal and supplement intakes in I/Os  Obtain weekly measured wts     Nutrition Assessment:  Admitted for abd pain, n/v. COVID negative. Dx SBO, confirmed on previous scans. NGT dc'd 12/18, having BMs. F/u KUB (12/21) showed small bowel gas without distention. Previously NPO/CLx10. On PPN since NPO day 5. MD Surg appreciative of RD recs, PPN at goal. Advanced to Full Liquids today, however pt with poor appetite per chart review. Nsg staff stated pt ate better at breakfast today, accepted juice, jello, and half of soup. RD added supplementation, will monitor acceptance/tolerance. Labs: H/H: 9.9/30.3, BUN 25, , POC -122, Ca 8.0. Meds: pepcid, miralax, zofran. Malnutrition Assessment:  Malnutrition Status:  Mild malnutrition(RD suspects higher degree of malnutrition)    Context:  Chronic illness     Findings of the 6 clinical characteristics of malnutrition:   Energy Intake:  Mild decrease in energy intake (specify)  Weight Loss:  Unable to assess     Body Fat Loss:  No significant body fat loss,     Muscle Mass Loss:  7 - Severe muscle mass loss, Calf (gastrocnemius), Temples (temporalis)  Fluid Accumulation:  No significant fluid accumulation,        Estimated Daily Nutrient Needs:  Energy (kcal): 1275kcal (28kcal/kg); Weight Used for Energy Requirements: Current  Protein (g): 55g (1.2g/kg);  Weight Used for Protein Requirements: Current  Fluid (ml/day): 1275mL; Method Used for Fluid Requirements: 1 ml/kcal       Needs for wt gain, geriatric age    Nutrition Related Findings:  NFPE finding mild and severe muscle wasting. NGT dc'd. Unclear if pt with dysphagia hx. No n/v. Continues to have BMs. No edema. Wounds:    None       Current Nutrition Therapies:  DIET FULL LIQUID  amino acid 4.25 % dextrose 5 % with electrolytes (CLINIMIX E) infusion  DIET NUTRITIONAL SUPPLEMENTS Breakfast, Lunch, Dinner; Ensure Compact  amino acid 4.25 % dextrose 5 % with electrolytes (CLINIMIX E) infusion  amino acid 4.25 % dextrose 5 % with electrolytes (CLINIMIX E) infusion  Current Parenteral Nutrition Orders:  · Type and Formula: Standard PPN   · Lipids: 250ml, Daily  · Duration: Continuous  · Rate/Volume: 60mL/hr  · Current PN Order Provides: 989kcal, 61g pro  · Goal PN Orders Provides: At goal    Anthropometric Measures:  · Height:  5' 3\" (160 cm)  · Current Body Wt:  45.5 kg (100 lb 5 oz)(12/20)   · Admission Body Wt:  110 lb 3.7 oz(Est)    · Usual Body Wt:  (ANNIE)     · Ideal Body Wt:  115 lbs:  87.2 %   · BMI Category:  Underweight (BMI less than 18.5)     Wt hx: 45.1kg (12/18), 50kg (12/15-admit).     Nutrition Diagnosis:   · Inadequate oral intake related to inadequate protein-energy intake as evidenced by nutrition support-parenteral nutrition, intake 26-50%, BMI    Nutrition Interventions:   Food and/or Nutrient Delivery: Continue current diet, Continue oral nutrition supplement  Nutrition Education and Counseling: No recommendations at this time  Coordination of Nutrition Care: Continue to monitor while inpatient, Swallow evaluation, Speech therapy    Goals:  Meet >75% EENs via PO vs PN        Meet >75% EENs via PN while NPO (met, d/c)  Tolerating oral diet in 7 days (met)  Lytes WNL (met)  Maintain bowel fx  (met)     Nutrition Monitoring and Evaluation:   Behavioral-Environmental Outcomes: None identified  Food/Nutrient Intake Outcomes: Diet advancement/tolerance, Parenteral nutrition intake/tolerance, Food and nutrient intake, Supplement intake  Physical Signs/Symptoms Outcomes: Biochemical data, Chewing or swallowing, GI status, Weight    Discharge Planning:     Too soon to determine     Electronically signed by Diane Mcrae on 12/22/2020 at 2:35 PM    Contact:  [phone]

## 2020-12-22 NOTE — CONSULTS
Consult    NAME: Lillian Mejias   :  10/28/1927   MRN:  [id number]     Date/Time:  2020 3:23 PM    Patient PCP: Evette Dubon MD  ________________________________________________________________________     Problem List:   -Hypertension  -Chest Pain  -Bowel obstruction  -Poor historian      Assessment/Plan:   -Admitted to the hospital with abdominal distention and pain and found to have bowel obstruction  -Consult was invited secondary to new onset chest pain  -EKG and initial troponin is pending.  -At the time of my examination patient was unable to engage in meaningful communication with me.  -Denies chest pain or shortness of breath but this is not reliable. -To obtain serial cardiac enzymes EKG and then we will evaluate after cardiogram is obtained.  -Current conservative medical management at this time.  -Once results are available we will give our input based on our cardiac evaluation. For consultation and letting me participate in the care of your patient. []        High complexity decision making was performed      Patient Active Problem List   Diagnosis Code    [facility] ([facility] K56.609        Subjective:   CHIEF COMPLAINT:     HISTORY OF PRESENT ILLNESS:     Ishmael Oakes is a 80 y.o.  female who was admitted to the hospital with abdominal distention and found to have bowel obstruction. When I saw the patient she was lying comfortably in the bed and I was unable to engage her in meaningful communication with me. No records available to me at this time and initial EKG and cardiac enzyme is pending. Obtain an echocardiogram for preoperative evaluation for possible noncardiac surgery. Continue current conservative medical management. We were asked to consult for work up and evaluation of the above problems. Past Medical History:   Diagnosis Date    Hypertension       History reviewed. No pertinent surgical history.   No Known Allergies Meds:  See below  Social History     Tobacco Use    Smoking status: Never Smoker    Smokeless tobacco: Never Used   Substance Use Topics    Alcohol use: Not Currently     Frequency: Never      History reviewed. No pertinent family history. REVIEW OF SYSTEMS:     []         Unable to obtain  ROS due to ---   [x]         Total of 12 systems reviewed as follows:    Constitutional: negative fever, negative chills, negative weight loss  Eyes:   negative visual changes  ENT:   negative sore throat, tongue or lip swelling  Respiratory:  negative cough, negative dyspnea  Cards:  negative for chest pain, palpitations, lower extremity edema  GI:   negative for nausea, vomiting, diarrhea, and abdominal pain  Genitourinary: negative for frequency, dysuria  Integument:  negative for rash   Hematologic:  negative for easy bruising and gum/nose bleeding  Musculoskel: negative for myalgias,  back pain  Neurological:  negative for headaches, dizziness, vertigo, weakness  Behavl/Psych: negative for feelings of anxiety, depression     Pertinent Positives include :    Objective:      Physical Exam:    Last 24hrs VS reviewed since prior progress note. Most recent are:    Visit Vitals  /68 (BP 1 Location: Right arm, BP Patient Position: At rest)   Pulse 91   Temp 98 °F (36.7 °C)   Resp 16   Ht 5' 3\" (1.6 m)   Wt 45.5 kg (100 lb 5 oz)   SpO2 98%   BMI 17.77 kg/m²       Intake/Output Summary (Last 24 hours) at 12/22/2020 1523  Last data filed at 12/22/2020 0245  Gross per 24 hour   Intake 1124.62 ml   Output    Net 1124.62 ml        General Appearance: Well developed, well nourished, alert & oriented x 3,    no acute distress. Ears/Nose/Mouth/Throat: Pupils equal and round, Hearing grossly normal.  Neck: Supple. JVP within normal limits. Carotids good upstrokes, with no bruit. Chest: Lungs clear to auscultation bilaterally. Cardiovascular: Regular rate and rhythm, S1S2 normal, no murmur, rubs, gallops.   Abdomen: Soft, non-tender, bowel sounds are active. No organomegaly. Extremities: No edema bilaterally. Femoral pulses +2, Distal Pulses +1. Skin: Warm and dry. Neuro: CN II-XII grossly intact, Strength and sensation grossly intact. []         Post-cath site without hematoma, bruit, tenderness, or thrill. Distal pulses intact. XR ABD (KUB)   Final Result   IMPRESSION: Small bowel gas without distention. CT ABD PELV WO CONT   Final Result   IMPRESSION:   1. The patient's undergone insertion of a nasogastric tube. The tube traverses   well beyond the pyloric channel and the tip of the tube is located within the   proximal jejunum. 2.  There has been a decrease in the degree of distention of the small bowel. There still is a moderate residual dilatation of the small bowel within the left   abdominal region with a relative decompression of the more distal small bowel. There is an apparent transition point (series 2 image number 81). The small   bowel loops beyond this transition point to the right of midline are narrowed   and are associated with edema within their wall. The small bowel from this   transition point toward the cecum is decompressed. 3.  There is significant persistent fecal impaction within the rectal vault with   stercoral proctitis. XR ABD (KUB)   Final Result   Findings/impression: Persistent dilatation of small bowel consistent distal   small bowel obstruction. Enteric tube remains looped within the distal   esophagus. Advanced degenerative changes of the spine and left pelvic hardware   again noted. CT ABD PELV WO CONT   Final Result   IMPRESSION: High-grade mechanical small bowel obstruction, etiology   indeterminate, possibly adhesions but internal hernia is not excluded. Some   bowel contacts the right femoral vein, possibly from over i.e. panniculitis but   correlate with any clinical suspicion of femoral hernia.  Gastric tube   incompletely visualized, coiled within the esophagus. Results called to [facility] on 12/13/2020 5:40 PM.         XR ABD (KUB)   Final Result      CT ABD PELV WO CONT   Final Result   Impression:   1. High-grade small bowel obstruction with a transition point in the mid   abdomen involving the distal small bowel. No evidence of perforation. Small   amount of ascites. 2.  Small bilateral pleural effusions. 3.  Large volume of stool within the rectum consistent with constipation. 4.  Multiple bilateral hyperattenuating renal lesions, likely consistent with   proteinaceous or hemorrhagic cysts. If indicated, consider follow-up CT in 6   months. 5.  Coronary artery disease and atherosclerotic disease of the aorta. XR ABD ACUTE W 1 V CHEST   Final Result   Impression:   1. No acute cardiopulmonary process. Hyperlucent lungs could indicate COPD   changes. 2.  Dilated loop of bowel in the midabdomen, possibly small bowel. Recommend CT   of the abdomen and pelvis for further evaluation. Data:      Telemetry:    EKG:  []  No new EKG for review.         Prior to Admission medications    Not on File       Recent Results (from the past 24 hour(s))   GLUCOSE, POC    Collection Time: 12/21/20  3:44 PM   Result Value Ref Range    Glucose (POC) 114 (H) 65 - 100 mg/dL    Performed by Hector Gaming, POC    Collection Time: 12/22/20 12:29 AM   Result Value Ref Range    Glucose (POC) 106 (H) 65 - 100 mg/dL    Performed by [address] [place] [address], POC    Collection Time: 12/22/20  5:36 AM   Result Value Ref Range    Glucose (POC) 111 (H) 65 - 100 mg/dL    Performed by [address] [place] [address], POC    Collection Time: 12/22/20 12:13 PM   Result Value Ref Range    Glucose (POC) 122 (H) 65 - 100 mg/dL    Performed by Peter Parson MD

## 2020-12-22 NOTE — PROGRESS NOTES
Hospitalist Progress Note    Subjective:   Daily Progress Note: 12/22/2020 2:26 PM    Reason for consult:   Patient is a 70-year-old female with history of hypertension and previous abdominal surgeries who was admitted on 12/11/2020 due to nausea, vomiting and abdominal pain. KUB showed dilated loops of bowel in the mid abdomen possibly small bowel obstruction. She was started on NG tube to suction for decompression and kept n.p.o. Blood pressures found to be elevated so internal medicine invited to to assist with management of hypertension. Subjective:  Examined patient at the bedside. Occupational therapy working with her. She remains profoundly weak after extended hospitalization. She is just had a loose bowel movement with associated abdominal discomfort. Current Facility-Administered Medications   Medication Dose Route Frequency    amino acid 4.25 % dextrose 5 % with electrolytes (CLINIMIX E) infusion   IntraVENous CONTINUOUS    fat emulsion 20% (LIPOSYN, INTRAlipid) infusion 250 mL  250 mL IntraVENous CONTINUOUS    [START ON 12/23/2020] amino acid 4.25 % dextrose 5 % with electrolytes (CLINIMIX E) infusion   IntraVENous CONTINUOUS    fat emulsion 20% (LIPOSYN, INTRAlipid) infusion 250 mL  250 mL IntraVENous CONTINUOUS    labetaloL (NORMODYNE;TRANDATE) 20 mg/4 mL (5 mg/mL) injection 10 mg  10 mg IntraVENous TID PRN    amino acid 4.25 % dextrose 5 % with electrolytes (CLINIMIX E) infusion   IntraVENous CONTINUOUS    NIFEdipine ER (PROCARDIA XL) tablet 60 mg  60 mg Oral DAILY    famotidine (PEPCID) tablet 20 mg  20 mg Oral Q48H    polyethylene glycol (MIRALAX) packet 17 g  17 g Oral DAILY    acetaminophen (TYLENOL) suppository 650 mg  650 mg Rectal Q4H PRN    ondansetron (ZOFRAN) injection 4 mg  4 mg IntraVENous Q6H PRN        REVIEW OF SYSTEMS    Review of Systems   Constitutional: Positive for malaise/fatigue and weight loss. Gastrointestinal: Positive for abdominal pain and diarrhea. Neurological: Positive for weakness. Objective:     Visit Vitals  /68 (BP 1 Location: Right arm, BP Patient Position: At rest)   Pulse 91   Temp 98 °F (36.7 °C)   Resp 16   Ht 5' 2.99\" (1.6 m)   Wt 45.5 kg (100 lb 5 oz)   SpO2 98%   BMI 17.77 kg/m²      O2 Device: Room air    Temp (24hrs), Av.8 °F (37.1 °C), Min:98 °F (36.7 °C), Max:99.3 °F (37.4 °C)      No intake/output data recorded.  1901 -  0700  In: 1638.9 [I.V.:1638.9]  Out: 700 [Urine:700]    PHYSICAL EXAM:    Physical Exam  Constitutional:       Appearance: She is ill-appearing. Cardiovascular:      Rate and Rhythm: Normal rate. Pulmonary:      Effort: Pulmonary effort is normal.   Abdominal:      Tenderness: There is abdominal tenderness. Neurological:      Mental Status: Mental status is at baseline. Motor: Weakness present. Coordination: Coordination abnormal.      Gait: Gait abnormal.          Data Review    Recent Results (from the past 24 hour(s))   GLUCOSE, POC    Collection Time: 20  3:44 PM   Result Value Ref Range    Glucose (POC) 114 (H) 65 - 100 mg/dL    Performed by Hector Gaming, POC    Collection Time: 20 12:29 AM   Result Value Ref Range    Glucose (POC) 106 (H) 65 - 100 mg/dL    Performed by [address] [place] [address], POC    Collection Time: 20  5:36 AM   Result Value Ref Range    Glucose (POC) 111 (H) 65 - 100 mg/dL    Performed by [address] [place] [address], POC    Collection Time: 20 12:13 PM   Result Value Ref Range    Glucose (POC) 122 (H) 65 - 100 mg/dL    Performed by Calos CHIRINOS        XR ABD (KUB)   Final Result   IMPRESSION: Small bowel gas without distention. CT ABD PELV WO CONT   Final Result   IMPRESSION:   1. The patient's undergone insertion of a nasogastric tube. The tube traverses   well beyond the pyloric channel and the tip of the tube is located within the   proximal jejunum.    2.  There has been a decrease in the degree of distention of the small bowel. There still is a moderate residual dilatation of the small bowel within the left   abdominal region with a relative decompression of the more distal small bowel. There is an apparent transition point (series 2 image number 81). The small   bowel loops beyond this transition point to the right of midline are narrowed   and are associated with edema within their wall. The small bowel from this   transition point toward the cecum is decompressed. 3.  There is significant persistent fecal impaction within the rectal vault with   stercoral proctitis. XR ABD (KUB)   Final Result   Findings/impression: Persistent dilatation of small bowel consistent distal   small bowel obstruction. Enteric tube remains looped within the distal   esophagus. Advanced degenerative changes of the spine and left pelvic hardware   again noted. CT ABD PELV WO CONT   Final Result   IMPRESSION: High-grade mechanical small bowel obstruction, etiology   indeterminate, possibly adhesions but internal hernia is not excluded. Some   bowel contacts the right femoral vein, possibly from over i.e. panniculitis but   correlate with any clinical suspicion of femoral hernia. Gastric tube   incompletely visualized, coiled within the esophagus. Results called to [facility] on 12/13/2020 5:40 PM.         XR ABD (KUB)   Final Result      CT ABD PELV WO CONT   Final Result   Impression:   1. High-grade small bowel obstruction with a transition point in the mid   abdomen involving the distal small bowel. No evidence of perforation. Small   amount of ascites. 2.  Small bilateral pleural effusions. 3.  Large volume of stool within the rectum consistent with constipation. 4.  Multiple bilateral hyperattenuating renal lesions, likely consistent with   proteinaceous or hemorrhagic cysts. If indicated, consider follow-up CT in 6   months.    5.  Coronary artery disease and atherosclerotic disease of the aorta. XR ABD ACUTE W 1 V CHEST   Final Result   Impression:   1. No acute cardiopulmonary process. Hyperlucent lungs could indicate COPD   changes. 2.  Dilated loop of bowel in the midabdomen, possibly small bowel. Recommend CT   of the abdomen and pelvis for further evaluation. Active Problems:    Small bowel obstruction ([address] (12/11/2020)        Assessment/Plan:     1. Small bowel obstruction: Resolving  · Likely secondary to adhesions from previous abdominal surgeries  · Repeat CT of abdomen showed there has been a decrease in the degree of distention of the small bowel, with a moderate residual dilatation of the small bowel  · She is on PPN for nutrition ( managed by surgery)  · NG tube was removed on 12/18/2020  · Full liquid diet but has poor oral intake     2. Acute dehydration: Improving  · Secondary to poor oral intake  · Continue gentle IV rehydration      3. Hypertension: Stable  · Blood pressures are acceptable  · Blood pressure this morning is 130/66  · Continue Nifedipine 60 mg daily PO     4.  Metabolic encephalopathy: Resolved  · Secondary to dehydration. · She is  now back to her baseline mental status  · Continue with IV fluids nutrition     DVT Prophylaxis: SCDs        ______________________________________________________________________________  Time spent in direct care including coordination of service, review of data and examination: > 35 minutes  Care Plan discussed with: RN and patient  ______________________________________________________________________________    French Billingsley NP    This is dictation was done by dragon, computer voice recognition software. Quite often unanticipated grammatical, syntax, homophones and other interpretive errors or inadvertently transcribed by the computer software. Please excuse errors that have escaped final proofreading. Thank you.

## 2020-12-22 NOTE — PROGRESS NOTES
OCCUPATIONAL THERAPY EVALUATION  Patient: Klaus Dutton (74 y.o. female)  Date: 12/22/2020  Primary Diagnosis: Small bowel obstruction ([facility] [K56.609]        Precautions: Fall risk      ASSESSMENT  Pt is a 81 y/o F with PMH of hypertension and previous abdominal surgeries who was admitted on 12/11/2020 due to nausea, vomiting and abdominal pain. KUB showed dilated loops of bowel in the mid abdomen possibly small bowel obstruction. She was started on NG tube to suction for decompression and kept n.p.o. NG tube removed 12/18/20. Pt admitted 12/11/20 and being treated for SBO. Pt received semi-supine in bed upon arrival, AXO to person only, and agreeable to OT evaluation at this time. Per pt, she lives alone in a one-story home, required assist from aides for ADLs at [facility] and was ambulatory using RW. Pt is noted to be a poor historian, therefore unclear of reliability of information at this time. Based on current observations, pt presents with deficits in generalized strength/AROM, static/dynamic sitting balance, bed mobility, functional activity tolerance, coordination, cognition, and increased abdominal pain impacting overall performance of ADLs and functional transfers/mobility. Pt noted to be soiled upon removal of covers. Pt currently requires mod A to roll to each side and total A for bowel/bladder hygiene and changing of chux pads. Pt winces with bed mobility while holding her abdomen. She is agreeable to sit EOB, transfers supine>sit with max A with continued support EOB 2' to impaired sitting balance and pain. Simulation of socks with total A. Pt tolerates <30 seconds EOB before requesting to lie back down req max A x2. Pt completes basic face washing task with mod A in long sitting.  Pt would benefit from continued skilled OT services during hospital stay and at next level of care to address current impairments and improve overall IND and safety with self cares and functional mobility upon d/c. OT recommending SNF at d/c once medically appropriate. Other factors to consider for discharge: Family support, DME, time since onset, severity of deficits      Patient will benefit from skilled therapy intervention to address the above noted impairments. PLAN :  Recommendations and Planned Interventions: self care training, functional mobility training, therapeutic exercise, balance training, therapeutic activities, cognitive retraining, endurance activities, and patient education    Frequency/Duration: Patient will be followed by occupational therapy 3 times a week to address goals. Recommendation for discharge: (in order for the patient to meet his/her long term goals)  SNF    This discharge recommendation:  Has been made in collaboration with the attending provider and/or case management       SUBJECTIVE:   Patient stated Gabe Sack and held her abdomen when rolling in bed. OBJECTIVE DATA SUMMARY:   HISTORY:   Past Medical History:   Diagnosis Date    Hypertension    History reviewed. No pertinent surgical history. Expanded or extensive additional review of patient history:     Home Situation  Home Environment: Private residence  Living Alone: Yes  Support Systems: Home care staff  Patient Expects to be Discharged to[de-identified] Skilled nursing facility  Current DME Used/Available at Home: césar Hodges, [address] [employer] [address] chair  Tub or Shower Type: Tub/Shower combination    EXAMINATION OF PERFORMANCE DEFICITS:  Cognitive/Behavioral Status:  Neurologic State: Alert  Orientation Level: Oriented to person;Disoriented to place; Disoriented to situation;Disoriented to time  Cognition: Decreased command following;Decreased attention/concentration; Impaired decision making    Hearing:   Auditory  Auditory Impairment: None    Vision/Perceptual:     WFL      Range of Motion:  AROM: Generally decreased, functional    Strength:  Strength: Generally decreased, functional      Coordination:     Fine Motor Skills-Upper: Comment(Grossly decreased, functional)       Tone & Sensation:  Sensation: Intact    Balance:  Sitting: Intact; With support  Standing: Impaired; With support  Standing - Static: Poor;Constant support  Standing - Dynamic : Poor;Constant support    Functional Mobility and Transfers for ADLs:  Bed Mobility:  Rolling: Moderate assistance  Supine to Sit: Maximum assistance;Assist x1;Assist x2  Sit to Supine: Maximum assistance;Assist x1;Assist x2  Scooting: Maximum assistance;Assist x1;Assist x2    Transfers:  Sit to Stand: Maximum assistance  Stand to Sit: Maximum assistance    ADL Intervention and task modifications:  Grooming  Grooming Assistance: Moderate assistance  Position Performed: Long sitting on bed  Washing Face: Moderate assistance    Lower Body Dressing Assistance  Socks: Total assistance (dependent)  Position Performed: Long sitting on bed    Toileting  Toileting Assistance: Total assistance(dependent)  Bowel Hygiene: Total assistance (dependent)  Clothing Management: Total assistance (dependent)  Cues: Verbal cues provided; Tactile cues provided    Therapeutic Exercise:  Pt would benefit from UE HEP to improve overall UE AROM/strength and can be further educated in next treatment session. Functional Measure:    [address] [facility] [address] [address] AM-PACTM \"6 Clicks\"                                                       Daily Activity Inpatient Short Form  How much help from another person does the patient currently need. .. Total; A Lot A Little None   1. Putting on and taking off regular lower body clothing? [x]  1 []  2 []  3 []  4   2. Bathing (including washing, rinsing, drying)? []  1 [x]  2 []  3 []  4   3. Toileting, which includes using toilet, bedpan or urinal? [x] 1 []  2 []  3 []  4   4. Putting on and taking off regular upper body clothing? []  1 [x]  2 []  3 []  4   5. Taking care of personal grooming such as brushing teeth? []  1 [x]  2 []  3 []  4   6. Eating meals?  []  1 []  2 [x]  3 []  4   © 2007, Trustees of [employer], under license to [employer]. All rights reserved     Score: 11/24     Interpretation of Tool:  Represents clinically-significant functional categories (i.e. Activities of daily living). Percentage of Impairment CH    0%   CI    1-19% CJ    20-39% CK    40-59% CL    60-79% CM    80-99% CN     100%   [facility]  Score 6-24 24 23 20-22 15-19 10-14 7-9 6        Occupational Therapy Evaluation Charge Determination   History Examination Decision-Making   LOW Complexity : Brief history review  LOW Complexity : 1-3 performance deficits relating to physical, cognitive , or psychosocial skils that result in activity limitations and / or participation restrictions  MEDIUM Complexity : Patient may present with comorbidities that affect occupational performnce. Miniml to moderate modification of tasks or assistance (eg, physical or verbal ) with assesment(s) is necessary to enable patient to complete evaluation       Based on the above components, the patient evaluation is determined to be of the following complexity level: LOW   Pain Rating:  Pt winces and holds abdomen with mobility, does not provide specific pain rating at this time    Activity Tolerance:   Poor and requires rest breaks    After treatment patient left in no apparent distress:    Supine in bed, Call bell within reach, Bed / chair alarm activated, and Side rails x 3    COMMUNICATION/EDUCATION:   The patients plan of care was discussed with: Physical therapist and Registered nurse. Patient/family have participated as able in goal setting and plan of care. and Patient/family agree to work toward stated goals and plan of care. This patients plan of care is appropriate for delegation to [facility].     Thank you for this referral.  Darinel Asencio  Time Calculation: 24 mins   Problem: Self Care Deficits Care Plan (Adult)  Goal: *Acute Goals and Plan of Care (Insert Text)  Description: Pt will be SBA sup <> sit in prep for EOB ADLs  Pt will be SBA grooming sitting EOB  Pt will be min A LE dressing sitting EOB/long sit  Pt will be min A sit <>  prep for toileting LRAD  Pt will be min A toileting/toilet transfer/cloth mgmt LRAD  Pt will be min A following UE HEP in prep for self care tasks  Outcome: Not Met

## 2020-12-22 NOTE — PROGRESS NOTES
Problem: Pressure Injury - Risk of  Goal: *Prevention of pressure injury  Description: Document Bubba Scale and appropriate interventions in the flowsheet. Outcome: Progressing Towards Goal  Note: Pressure Injury Interventions:  Sensory Interventions: Assess changes in LOC, Keep linens dry and wrinkle-free, Minimize linen layers, Turn and reposition approx. every two hours (pillows and wedges if needed)    Moisture Interventions: Absorbent underpads, Internal/External urinary devices, Minimize layers    Activity Interventions: Increase time out of bed    Mobility Interventions: PT/OT evaluation, Turn and reposition approx.  every two hours(pillow and wedges)    Nutrition Interventions: Document food/fluid/supplement intake    Friction and Shear Interventions: Minimize layers

## 2020-12-22 NOTE — PROGRESS NOTES
Problem: Mobility Impaired (Adult and Pediatric)  Goal: *Acute Goals and Plan of Care (Insert Text)  Description: Patient will move from supine to sit and sit to supine , scoot up and down, and roll side to side in bed with moderate assistance  within 7 day(s).    Patient will transfer from bed to chair and chair to bed with moderate assistance  using the least restrictive device within 7 day(s).    Patient will improve static standing balance to moderate assistance within 1 week(s).  Patient will ambulate 10 feet with moderate assistance with least restrictive device within 1 weeks.       Outcome: Not Met   PHYSICAL THERAPY EVALUATION  Patient: Caitie Ulloa (89 y.o. female)  Date: 12/22/2020  Primary Diagnosis: Small bowel obstruction (HCC) [K56.609]        Precautions:fall  ASSESSMENT  Based on the objective data described below, Caitie Ulloa is a 89 y.o. female who presents for evaluation of abdominal pain accompanied by nausea and vomiting.. The pain is described as sharp, cramping, intermittent. Onset was a few days ago.  Patient is unable to give me any aggravating or alleviating factors.  She does state that she thought she had constipation but then started vomiting.  patient adm 12/11/2020. patient has had NGT and removed.   Patient lives at home in [place] alone.  She has a personal care aid that comes twice a day everyday.  She uses a walker at home but has no other DME.  She is able to get around with the walker.  Patient does have Dementia and she supposedly had a stroke a while ago.patient required max assist for bed mob, transfers unable to stand with max assist of 1.at bed side.has decreased vfunctional level and will benefit from cont of therapy.   Current Level of Function Impacting Discharge (mobility/balance): patient required max assist for al mobility. Alert but disoriented.  Other factors to consider for discharge: will need snf prior to going home.   Patient will benefit from skilled  therapy intervention to address the above noted impairments. PLAN :  Recommendations and Planned Interventions: bed mobility training, transfer training, therapeutic exercises, patient and family training/education, and therapeutic activities      Frequency/Duration: Patient will be followed by physical therapy:  3 times a week to address goals. Recommendation for discharge: (in order for the patient to meet his/her long term goals)  Therapy up to 5 days/week in SNF setting    This discharge recommendation:  Has been made in collaboration with the attending provider and/or case management    IF patient discharges home will need the following DME: hospital bed, walker, and wheelchair         SUBJECTIVE:   Patient stated Patient c/o pain in lower abd part. Unable to verbalized but kept holding the lower area with facial expression of pauin-clenching. OBJECTIVE DATA SUMMARY:   HISTORY:    Past Medical History:   Diagnosis Date    Hypertension    History reviewed. No pertinent surgical history. Personal factors and/or comorbidities impacting plan of care:   Home Situation  Home Environment: Private residence  Living Alone: Yes  Support Systems: Home care staff  Patient Expects to be Discharged to[de-identified] Skilled nursing facility  Current DME Used/Available at Home: Walker, rolling    EXAMINATION/PRESENTATION/DECISION MAKING:   Critical Behavior:  Neurologic State: Alert  Orientation Level: Disoriented X4  Cognition: Impaired decision making     Hearing: Auditory  Auditory Impairment: None  Range Of Motion:  AROM: Generally decreased, functional                       Strength:    Strength: Generally decreased, functional                    Tone & Sensation:                  Sensation: Intact               Functional Mobility:  Bed Mobility:  Rolling:  Moderate assistance  Supine to Sit: Maximum assistance;Assist x1;Assist x2  Sit to Supine: Maximum assistance;Assist x1;Assist x2  Scooting: Maximum assistance;Assist x1;Assist x2  Transfers:  Sit to Stand: Maximum assistance  Stand to Sit: Maximum assistance                       Balance:   Sitting: Intact; With support  Standing: Impaired; With support  Standing - Static: Poor;Constant support  Standing - Dynamic : Poor;Constant support  Ambulation/Gait Training:                                                    Functional Measure:  [address] [facility] [address] [address] AM-PAC 6 Clicks         Basic Mobility Inpatient Short Form  How much difficulty does the patient currently have. .. Unable A Lot A Little None   1. Turning over in bed (including adjusting bedclothes, sheets and blankets)? [] 1   [x] 2   [] 3   [] 4   2. Sitting down on and standing up from a chair with arms ( e.g., wheelchair, bedside commode, etc.)   [x] 1   [] 2   [] 3   [] 4   3. Moving from lying on back to sitting on the side of the bed? [] 1   [x] 2   [] 3   [] 4          How much help from another person does the patient currently need. .. Total A Lot A Little None   4. Moving to and from a bed to a chair (including a wheelchair)? [x] 1   [] 2   [] 3   [] 4   5. Need to walk in hospital room? [x] 1   [] 2   [] 3   [] 4   6. Climbing 3-5 steps with a railing? [x] 1   [] 2   [] 3   [] 4   © 2007, Trustees of [address] [place] [address] [address] [address], under license to [employer]. All rights reserved     Score:  Initial: 8/24 Most Recent: X (Date: 12/22/2020 )   Interpretation of Tool:  Represents activities that are increasingly more difficult (i.e. Bed mobility, Transfers, Gait).   Score 24 23 22-20 19-15 14-10 9-7 6   Modifier CH CI CJ CK CL CM CN           Physical Therapy Evaluation Charge Determination   History Examination Presentation Decision-Making   MEDIUM  Complexity : 1-2 comorbidities / personal factors will impact the outcome/ POC  MEDIUM Complexity : 3 Standardized tests and measures addressing body structure, function, activity limitation and / or participation in recreation  MEDIUM Complexity : Evolving with changing characteristics  MEDIUM Complexity : FOTO score of 26-74      Based on the above components, the patient evaluation is determined to be of the following complexity level: MEDIUM    Pain Ratin/10    Activity Tolerance:   Fair  Please refer to the flowsheet for vital signs taken during this treatment. After treatment patient left in no apparent distress:   Supine in bed    COMMUNICATION/EDUCATION:   The patients plan of care was discussed with: Registered nurse.          Thank you for this referral.  Randa Price   Time Calculation: 24 mins

## 2020-12-23 ENCOUNTER — APPOINTMENT (OUTPATIENT)
Dept: NON INVASIVE DIAGNOSTICS | Age: 85
DRG: 388 | End: 2020-12-23
Attending: INTERNAL MEDICINE
Payer: MEDICARE

## 2020-12-23 ENCOUNTER — APPOINTMENT (OUTPATIENT)
Dept: CT IMAGING | Age: 85
DRG: 388 | End: 2020-12-23
Attending: SURGERY
Payer: MEDICARE

## 2020-12-23 LAB
ATRIAL RATE: 87 BPM
CALCULATED P AXIS, ECG09: 53 DEGREES
CALCULATED R AXIS, ECG10: 57 DEGREES
CALCULATED T AXIS, ECG11: 44 DEGREES
DIAGNOSIS, 93000: NORMAL
ECHO AV PEAK GRADIENT: 14 MMHG
ECHO EST RA PRESSURE: 8 MMHG
ECHO LV EDV A2C: 39 CM3
ECHO LV EJECTION FRACTION A2C: 48 %
ECHO LV EJECTION FRACTION BIPLANE: 80.8 % (ref 55–100)
ECHO LV ESV A2C: 5.36 CM3
ECHO LV INTERNAL DIMENSION DIASTOLIC: 3.39 CM (ref 3.9–5.3)
ECHO LV INTERNAL DIMENSION SYSTOLIC: 1.75 CM
ECHO LV IVSD: 1.04 CM (ref 0.6–0.9)
ECHO LV MASS 2D: 107.4 G (ref 67–162)
ECHO LV MASS INDEX 2D: 74.5 G/M2 (ref 43–95)
ECHO LV POSTERIOR WALL DIASTOLIC: 1.08 CM (ref 0.6–0.9)
ECHO LVOT PEAK GRADIENT: 7 MMHG
ECHO MV E DECELERATION TIME (DT): 143 MS
ECHO MV E VELOCITY: 137 CM/S
ECHO PV PEAK INSTANTANEOUS GRADIENT SYSTOLIC: 9 MMHG
ECHO PV REGURGITANT MAX VELOCITY: 131 CM/S
ECHO PV REGURGITANT MAX VELOCITY: 149 CM/S
ECHO PV REGURGITANT MAX VELOCITY: 189 CM/S
ECHO RIGHT VENTRICULAR SYSTOLIC PRESSURE (RVSP): 63 MMHG
ECHO RV INTERNAL DIMENSION: 2.88 CM
ECHO TV MAX VELOCITY: 370 CM/S
ECHO TV REGURGITANT PEAK GRADIENT: 55 MMHG
GLUCOSE BLD STRIP.AUTO-MCNC: 112 MG/DL (ref 65–100)
GLUCOSE BLD STRIP.AUTO-MCNC: 117 MG/DL (ref 65–100)
GLUCOSE BLD STRIP.AUTO-MCNC: 118 MG/DL (ref 65–100)
P-R INTERVAL, ECG05: 304 MS
PERFORMED BY, TECHID: ABNORMAL
Q-T INTERVAL, ECG07: 336 MS
QRS DURATION, ECG06: 84 MS
QTC CALCULATION (BEZET), ECG08: 404 MS
VENTRICULAR RATE, ECG03: 87 BPM

## 2020-12-23 PROCEDURE — 74176 CT ABD & PELVIS W/O CONTRAST: CPT

## 2020-12-23 PROCEDURE — 97530 THERAPEUTIC ACTIVITIES: CPT

## 2020-12-23 PROCEDURE — 82962 GLUCOSE BLOOD TEST: CPT

## 2020-12-23 PROCEDURE — 74011250636 HC RX REV CODE- 250/636: Performed by: SURGERY

## 2020-12-23 PROCEDURE — 65270000029 HC RM PRIVATE

## 2020-12-23 PROCEDURE — 74011000250 HC RX REV CODE- 250: Performed by: NURSE PRACTITIONER

## 2020-12-23 PROCEDURE — 74011250637 HC RX REV CODE- 250/637: Performed by: COLON & RECTAL SURGERY

## 2020-12-23 PROCEDURE — 74011000636 HC RX REV CODE- 636: Performed by: COLON & RECTAL SURGERY

## 2020-12-23 PROCEDURE — 74011000258 HC RX REV CODE- 258: Performed by: NURSE PRACTITIONER

## 2020-12-23 PROCEDURE — 74011250637 HC RX REV CODE- 250/637: Performed by: NURSE PRACTITIONER

## 2020-12-23 PROCEDURE — 93306 TTE W/DOPPLER COMPLETE: CPT

## 2020-12-23 PROCEDURE — 74011000258 HC RX REV CODE- 258: Performed by: SURGERY

## 2020-12-23 RX ORDER — KETOROLAC TROMETHAMINE 15 MG/ML
15 INJECTION, SOLUTION INTRAMUSCULAR; INTRAVENOUS ONCE
Status: COMPLETED | OUTPATIENT
Start: 2020-12-23 | End: 2020-12-23

## 2020-12-23 RX ADMIN — KETOROLAC TROMETHAMINE 15 MG: 15 INJECTION, SOLUTION INTRAMUSCULAR; INTRAVENOUS at 16:10

## 2020-12-23 RX ADMIN — NIFEDIPINE 60 MG: 30 TABLET, FILM COATED, EXTENDED RELEASE ORAL at 08:42

## 2020-12-23 RX ADMIN — DIATRIZOATE MEGLUMINE AND DIATRIZOATE SODIUM 30 ML: 660; 100 LIQUID ORAL; RECTAL at 18:28

## 2020-12-23 RX ADMIN — ASCORBIC ACID, VITAMIN A PALMITATE, CHOLECALCIFEROL, THIAMINE HYDROCHLORIDE, RIBOFLAVIN-5 PHOSPHATE SODIUM, PYRIDOXINE HYDROCHLORIDE, NIACINAMIDE, DEXPANTHENOL, ALPHA-TOCOPHEROL ACETATE, VITAMIN K1, FOLIC ACID, BIOTIN, CYANOCOBALAMIN: 200; 3300; 200; 6; 3.6; 6; 40; 15; 10; 150; 600; 60; 5 INJECTION, SOLUTION INTRAVENOUS at 21:46

## 2020-12-23 RX ADMIN — POLYETHYLENE GLYCOL 3350 17 G: 17 POWDER, FOR SOLUTION ORAL at 08:42

## 2020-12-23 RX ADMIN — LEUCINE, PHENYLALANINE, LYSINE, METHIONINE, ISOLEUCINE, VALINE, HISTIDINE, THREONINE, TRYPTOPHAN, ALANINE, GLYCINE, ARGININE, PROLINE, SERINE, TYROSINE, SODIUM ACETATE, DIBASIC POTASSIUM PHOSPHATE, MAGNESIUM CHLORIDE, SODIUM CHLORIDE, CALCIUM CHLORIDE, DEXTROSE
311; 238; 247; 170; 255; 247; 204; 179; 77; 880; 438; 489; 289; 213; 17; 297; 261; 51; 77; 33; 5 INJECTION INTRAVENOUS at 13:42

## 2020-12-23 RX ADMIN — FAMOTIDINE 20 MG: 20 TABLET ORAL at 08:42

## 2020-12-23 NOTE — PROGRESS NOTES
Problem: Pressure Injury - Risk of  Goal: *Prevention of pressure injury  Description: Document Bubba Scale and appropriate interventions in the flowsheet. Outcome: Progressing Towards Goal  Note: Pressure Injury Interventions:  Sensory Interventions: Keep linens dry and wrinkle-free, Minimize linen layers, Turn and reposition approx.  every two hours (pillows and wedges if needed)    Moisture Interventions: Absorbent underpads, Minimize layers, Check for incontinence Q2 hours and as needed    Activity Interventions: Increase time out of bed    Mobility Interventions: HOB 30 degrees or less    Nutrition Interventions: Document food/fluid/supplement intake    Friction and Shear Interventions: Foam dressings/transparent film/skin sealants, Minimize layers

## 2020-12-23 NOTE — PROGRESS NOTES
Hospitalist Progress Note    Subjective:   Daily Progress Note: 2020 2:26 PM    Reason for consult:   Patient is a 72-year-old female with history of hypertension and previous abdominal surgeries who was admitted on 2020 due to nausea, vomiting and abdominal pain. KUB showed dilated loops of bowel in the mid abdomen possibly small bowel obstruction. She was started on NG tube to suction for decompression and kept n.p.o. Blood pressures found to be elevated so internal medicine invited to to assist with management of hypertension. Subjective: Follow-up examination of patient at the bedside. She remains profoundly weak after extended hospitalization. Continue to wait for complete return of bowel function. Current Facility-Administered Medications   Medication Dose Route Frequency    amino acid 4.25 % dextrose 5 % with electrolytes (CLINIMIX E) infusion   IntraVENous CONTINUOUS    amino acid 4.25 % dextrose 5 % with electrolytes (CLINIMIX E) infusion   IntraVENous CONTINUOUS    labetaloL (NORMODYNE;TRANDATE) 20 mg/4 mL (5 mg/mL) injection 10 mg  10 mg IntraVENous TID PRN    NIFEdipine ER (PROCARDIA XL) tablet 60 mg  60 mg Oral DAILY    famotidine (PEPCID) tablet 20 mg  20 mg Oral Q48H    polyethylene glycol (MIRALAX) packet 17 g  17 g Oral DAILY    acetaminophen (TYLENOL) suppository 650 mg  650 mg Rectal Q4H PRN    ondansetron (ZOFRAN) injection 4 mg  4 mg IntraVENous Q6H PRN        REVIEW OF SYSTEMS    Review of Systems   Constitutional: Positive for malaise/fatigue and weight loss. Gastrointestinal: Positive for abdominal pain and diarrhea. Neurological: Positive for weakness.         Objective:     Visit Vitals  /61   Pulse (!) 113   Temp 98.4 °F (36.9 °C)   Resp 18   Ht 5' 2.99\" (1.6 m)   Wt 45.5 kg (100 lb 5 oz)   SpO2 97%   BMI 17.77 kg/m²      O2 Device: Room air    Temp (24hrs), Av.3 °F (36.8 °C), Min:98.2 °F (36.8 °C), Max:98.4 °F (36.9 °C)      No intake/output data recorded. 12/21 1901 - 12/23 0700  In: 1224.6 [P.O.:100; I.V.:1124.6]  Out: 300 [Urine:300]    PHYSICAL EXAM:    Physical Exam  Constitutional:       Appearance: She is ill-appearing. Cardiovascular:      Rate and Rhythm: Normal rate. Pulmonary:      Effort: Pulmonary effort is normal.   Abdominal:      Tenderness: There is abdominal tenderness. Neurological:      Mental Status: Mental status is at baseline. Motor: Weakness present. Coordination: Coordination abnormal.      Gait: Gait abnormal.          Data Review    Recent Results (from the past 24 hour(s))   TROPONIN I    Collection Time: 12/22/20  3:55 PM   Result Value Ref Range    Troponin-I, Qt. <0.05 <9.35 ng/mL   METABOLIC PANEL, COMPREHENSIVE    Collection Time: 12/22/20  3:55 PM   Result Value Ref Range    Sodium 137 136 - 145 mmol/L    Potassium 4.5 3.5 - 5.1 mmol/L    Chloride 105 97 - 108 mmol/L    CO2 28 21 - 32 mmol/L    Anion gap 4 (L) 5 - 15 mmol/L    Glucose 106 (H) 65 - 100 mg/dL    BUN 30 (H) 6 - 20 mg/dL    Creatinine 0.69 0.55 - 1.02 mg/dL    BUN/Creatinine ratio 43 (H) 12 - 20      GFR est AA >60 >60 ml/min/1.73m2    GFR est non-AA >60 >60 ml/min/1.73m2    Calcium 8.4 (L) 8.5 - 10.1 mg/dL    Bilirubin, total 0.4 0.2 - 1.0 mg/dL    AST (SGOT) 30 15 - 37 U/L    ALT (SGPT) 41 12 - 78 U/L    Alk.  phosphatase 69 45 - 117 U/L    Protein, total 6.0 (L) 6.4 - 8.2 g/dL    Albumin 2.4 (L) 3.5 - 5.0 g/dL    Globulin 3.6 2.0 - 4.0 g/dL    A-G Ratio 0.7 (L) 1.1 - 2.2     CBC WITH AUTOMATED DIFF    Collection Time: 12/22/20  3:55 PM   Result Value Ref Range    WBC 7.8 3.6 - 11.0 K/uL    RBC 3.90 3.80 - 5.20 M/uL    HGB 10.4 (L) 11.5 - 16.0 g/dL    HCT 32.0 (L) 35.0 - 47.0 %    MCV 82.1 80.0 - 99.0 FL    MCH 26.7 26.0 - 34.0 PG    MCHC 32.5 30.0 - 36.5 g/dL    RDW 14.7 (H) 11.5 - 14.5 %    PLATELET 392 561 - 130 K/uL    MPV 10.3 8.9 - 12.9 FL    NEUTROPHILS 79 (H) 32 - 75 %    LYMPHOCYTES 10 (L) 12 - 49 %    MONOCYTES 9 5 - 13 % EOSINOPHILS 1 0 - 7 %    BASOPHILS 0 0 - 1 %    IMMATURE GRANULOCYTES 1 (H) 0.0 - 0.5 %    ABS. NEUTROPHILS 6.3 1.8 - 8.0 K/UL    ABS. LYMPHOCYTES 0.8 0.8 - 3.5 K/UL    ABS. MONOCYTES 0.7 0.0 - 1.0 K/UL    ABS. EOSINOPHILS 0.1 0.0 - 0.4 K/UL    ABS. BASOPHILS 0.0 0.0 - 0.1 K/UL    ABS. IMM. GRANS. 0.1 (H) 0.00 - 0.04 K/UL    DF AUTOMATED     GLUCOSE, POC    Collection Time: 12/22/20  7:07 PM   Result Value Ref Range    Glucose (POC) 130 (H) 65 - 100 mg/dL    Performed by Drew Lewis    GLUCOSE, POC    Collection Time: 12/23/20  1:12 AM   Result Value Ref Range    Glucose (POC) 112 (H) 65 - 100 mg/dL    Performed by Osiris Schulz, POC    Collection Time: 12/23/20  8:45 AM   Result Value Ref Range    Glucose (POC) 118 (H) 65 - 100 mg/dL    Performed by Ricka Libman    ECHO ADULT COMPLETE    Collection Time: 12/23/20 10:36 AM   Result Value Ref Range    Pulmonic Regurgitant End Max Velocity 189.00 cm/s    AoV PG 14.00 mmHg    IVSd 1.04 (A) 0.6 - 0.9 cm    LVIDd 3.39 (A) 3.9 - 5.3 cm    LVIDs 1.75 cm    Pulmonic Regurgitant End Max Velocity 131.00 cm/s    LVOT Peak Gradient 7.00 mmHg    LVPWd 1.08 (A) 0.6 - 0.9 cm    LV ED Vol A2C 39.00 cm3    BP EF 80.8 55 - 100 %    LV ES Vol A2C 5.36 cm3    LV Ejection Fraction MOD 2C 48 %    Mitral Valve E Wave Deceleration Time 143.00 ms    MV E Darwin 137.00 cm/s    Pulmonic Regurgitant End Max Velocity 149.00 cm/s    Pulmonic Valve Systolic Peak Instantaneous Gradient 9.00 mmHg    Est. RA Pressure 8.00 mmHg    RVIDd 2.88 cm    RVSP 63.00 mmHg    Tricuspid Valve Max Velocity 370.00 cm/s    Triscuspid Valve Regurgitation Peak Gradient 55.00 mmHg    LV Mass .4 67 - 162 g    LV Mass AL Index 74.5 43 - 95 g/m2       XR ABD (KUB)   Final Result   IMPRESSION: Small bowel gas without distention. CT ABD PELV WO CONT   Final Result   IMPRESSION:   1. The patient's undergone insertion of a nasogastric tube.  The tube traverses   well beyond the pyloric channel and the tip of the tube is located within the   proximal jejunum. 2.  There has been a decrease in the degree of distention of the small bowel. There still is a moderate residual dilatation of the small bowel within the left   abdominal region with a relative decompression of the more distal small bowel. There is an apparent transition point (series 2 image number 81). The small   bowel loops beyond this transition point to the right of midline are narrowed   and are associated with edema within their wall. The small bowel from this   transition point toward the cecum is decompressed. 3.  There is significant persistent fecal impaction within the rectal vault with   stercoral proctitis. XR ABD (KUB)   Final Result   Findings/impression: Persistent dilatation of small bowel consistent distal   small bowel obstruction. Enteric tube remains looped within the distal   esophagus. Advanced degenerative changes of the spine and left pelvic hardware   again noted. CT ABD PELV WO CONT   Final Result   IMPRESSION: High-grade mechanical small bowel obstruction, etiology   indeterminate, possibly adhesions but internal hernia is not excluded. Some   bowel contacts the right femoral vein, possibly from over i.e. panniculitis but   correlate with any clinical suspicion of femoral hernia. Gastric tube   incompletely visualized, coiled within the esophagus. Results called to [facility] on 12/13/2020 5:40 PM.         XR ABD (KUB)   Final Result      CT ABD PELV WO CONT   Final Result   Impression:   1. High-grade small bowel obstruction with a transition point in the mid   abdomen involving the distal small bowel. No evidence of perforation. Small   amount of ascites. 2.  Small bilateral pleural effusions. 3.  Large volume of stool within the rectum consistent with constipation.    4.  Multiple bilateral hyperattenuating renal lesions, likely consistent with   proteinaceous or hemorrhagic cysts. If indicated, consider follow-up CT in 6   months. 5.  Coronary artery disease and atherosclerotic disease of the aorta. XR ABD ACUTE W 1 V CHEST   Final Result   Impression:   1. No acute cardiopulmonary process. Hyperlucent lungs could indicate COPD   changes. 2.  Dilated loop of bowel in the midabdomen, possibly small bowel. Recommend CT   of the abdomen and pelvis for further evaluation. Active Problems:    Small bowel obstruction ([address] (12/11/2020)        Assessment/Plan:     1. Small bowel obstruction: Resolving  · Likely secondary to adhesions from previous abdominal surgeries  · Repeat CT of abdomen showed there has been a decrease in the degree of distention of the small bowel, with a moderate residual dilatation of the small bowel  · She is on PPN/IL for nutrition ( managed by surgery)  · NG tube was removed on 12/18/2020  · Full liquid diet but has poor oral intake  · Functioning returning slowly, positive flatus, positive stool     2. Acute dehydration: Improving  · Secondary to poor oral intake  · Continue gentle IV rehydration      3. Hypertension: Stable  · Blood pressures are acceptable  · Blood pressure this morning is 130/66  · Continue Nifedipine 60 mg daily PO     4.  Metabolic encephalopathy: Resolved  · Secondary to dehydration. · She is  now back to her baseline mental status  · Continue with IV fluids nutrition     DVT Prophylaxis: SCDs    ______________________________________________________________________________  Time spent in direct care including coordination of service, review of data and examination: > 35 minutes  Care Plan discussed with: RN and patient  ______________________________________________________________________________    Jean Carlos Hammonds NP    This is dictation was done by dragon, computer voice recognition software.   Quite often unanticipated grammatical, syntax, homophones and other interpretive errors or inadvertently transcribed by the computer software. Please excuse errors that have escaped final proofreading. Thank you.

## 2020-12-23 NOTE — PROGRESS NOTES
Progress Note  Date:2020       Room:[facility]  Patient Jean Carlos Godfrey     YOB: 1927     Age:89 y.o. Subjective    Subjective:  Symptoms:  Worsening. She reports cough. No shortness of breath, chest pain or diarrhea. Diet:  Poor intake. No nausea or vomiting. Activity level: Impaired due to weakness. Pain:  She complains of pain that is moderate. She reports pain is worsening. Review of Systems   Constitutional: Negative for chills and fever. Respiratory: Positive for cough. Negative for shortness of breath. Cardiovascular: Negative for chest pain. Gastrointestinal: Positive for abdominal pain. Negative for abdominal distention, blood in stool, constipation, diarrhea, nausea and vomiting. Genitourinary: Negative for difficulty urinating. Skin: Negative. Neurological: Negative. Objective         Vitals Last 24 Hours:  TEMPERATURE:  Temp  Av.3 °F (36.8 °C)  Min: 98.2 °F (36.8 °C)  Max: 98.4 °F (36.9 °C)  RESPIRATIONS RANGE: Resp  Av.6  Min: 16  Max: 18  PULSE OXIMETRY RANGE: SpO2  Av.8 %  Min: 97 %  Max: 99 %  PULSE RANGE: Pulse  Av.8  Min: 74  Max: 113  BLOOD PRESSURE RANGE: Systolic (30FIN), XQQ:339 , Min:119 , HPY:275   ; Diastolic (01NTN), JKO:14, Min:61, Max:77    I/O (24Hr): Intake/Output Summary (Last 24 hours) at 2020 1523  Last data filed at 2020 1342  Gross per 24 hour   Intake 1000 ml   Output 300 ml   Net 700 ml     Objective:  General Appearance:  General patient appearance: Patient resting comfortably, with intermittent abdominal pain. She will grimace for a few moments and then relax again. Vital signs: (most recent): Blood pressure 119/61, pulse (!) 113, temperature 98.4 °F (36.9 °C), resp. rate 18, height 5' 2.99\" (1.6 m), weight 45.5 kg (100 lb 5 oz), SpO2 97 %. No fever.   (Vital signs reviewed, has had intermittent hypertension today, and most recently noted to also have tachycardia. ). Lungs:  Normal effort and normal respiratory rate. There are rales and rhonchi. Heart: Tachycardia. Regular rhythm. No murmur, gallop or friction rub. Abdomen: Abdomen is soft. There is lara-umbilical (Pain at the superiormost aspect of her midline scar. Non-tender in the remainder of her abdomen. No peritoneal signs.) tenderness. Pulses: Distal pulses are intact. Neurological: Patient is alert. Skin:  Warm and dry. Labs/Imaging/Diagnostics    Labs:  CBC:  Recent Labs     12/22/20  1555 12/21/20  0710   WBC 7.8 7.7   RBC 3.90 3.71*   HGB 10.4* 9.9*   HCT 32.0* 30.3*   MCV 82.1 81.7   RDW 14.7* 14.6*    242     CHEMISTRIES:  Recent Labs     12/22/20  1555 12/21/20  0710    138   K 4.5 4.3    106   CO2 28 27   BUN 30* 25*   CA 8.4* 8.0*   PHOS  --  3.7   MG  --  2.3   PT/INR:No results for input(s): INR, INREXT in the last 72 hours. No lab exists for component: PROTIME  APTT:No results for input(s): APTT in the last 72 hours. LIVER PROFILE:  Recent Labs     12/22/20  1555   AST 30   ALT 41     Lab Results   Component Value Date/Time    ALT (SGPT) 41 12/22/2020 03:55 PM    AST (SGOT) 30 12/22/2020 03:55 PM    Alk. phosphatase 69 12/22/2020 03:55 PM    Bilirubin, total 0.4 12/22/2020 03:55 PM       Imaging Last 24 Hours:  Echo Adult Complete    Result Date: 12/23/2020  · LV: Estimated LVEF is 65 - 70%. Normal cavity size and wall thickness. Hyperdynamic systolic function. · Image quality for this study was technically difficult. · LA: Mildly dilated left atrium. · AV: Aortic valve leaflet calcification present. · MV: Mild mitral annular calcification. · TV: Mild tricuspid valve regurgitation is present. · PA: Pulmonary arterial systolic pressure is 63 mmHg. · Pericardium: Trivial-to-small pericardial effusion adjacent to right ventricle. Effusion is fibrinous.       Assessment//Plan   Active Problems:    Small bowel obstruction (HCC) (12/11/2020)      Assessment:    Condition: In stable condition. Worsening. (Mixed clinical picture in this 81yo woman admitted for incomplete bowel obstruction. On the one hand, she is tolerating a diet without nausea or vomiting, is having bowel movements. On the other, she complains of intermittent abdominal pain located in the middle and appreciated on physical exam at the top of a midline scar from previous surgery. The remainder of her abdominal exam is unremarkable and without peritoneal signs. Review of previous imaging demonstrates that no prior studies have included the use of oral contrast. As patient is tolerating po, will obtain CT A/P with oral contrast to determine if contrast transits to the colon, or if there is evidence of an obstruction. Cont PPN in combination with encouraging po intake. Son at bedside, case discussed with him.).        Electronically signed by Larry Arciniega DO on 12/23/2020 at 3:23 PM

## 2020-12-23 NOTE — PROGRESS NOTES
Problem: Self Care Deficits Care Plan (Adult)  Goal: *Acute Goals and Plan of Care (Insert Text)  Description: Pt will be SBA sup <> sit in prep for EOB ADLs  Pt will be SBA grooming sitting EOB  Pt will be min A LE dressing sitting EOB/long sit  Pt will be min A sit <>  prep for toileting LRAD  Pt will be min A toileting/toilet transfer/cloth mgmt LRAD  Pt will be min A following UE HEP in prep for self care tasks  Outcome: Progressing Towards Goal   OCCUPATIONAL THERAPY TREATMENT  Patient: Ana Cristina Chen (32 y.o. female)  Date: 12/23/2020  Diagnosis: [facility] bowel obstruction ([facility] [K56.609] <principal problem not specified>       Precautions:    Chart, occupational therapy assessment, plan of care, and goals were reviewed. ASSESSMENT  Patient continues with skilled OT services and is progressing towards goals. Patient demos increased pain and req increased motivation to participate with all bed mobility and UE semi- supine therex. Pt was max A x2 for bed mobility and sit to stand transfers x2 trials. Patient stood with flexed posture and post weight shift with inability to maintain static standing more than a few seconds. Patient req constant encouragement throughout therapy session and encouragement to perform UE therex. Limited to therapy session d/t pt's increased abdominal pain and decreased activity tolerance sitting at EOB. PLAN :  Patient continues to benefit from skilled intervention to address the above impairments. Continue treatment per established plan of care. to address goals. Recommendation for discharge: (in order for the patient to meet his/her long term goals)  Therapy up to 5 days/week in SNF setting    This discharge recommendation:  Has been made in collaboration with the attending provider and/or case management    IF patient discharges home will need the following DME: TBD       SUBJECTIVE:   Patient stated I dont want to.  when asked to perform UE and LE therex. OBJECTIVE DATA SUMMARY:   Cognitive/Behavioral Status:  Neurologic State: Alert;Confused  Orientation Level: Oriented to person  Cognition: Impaired decision making    Functional Mobility and Transfers for ADLs:  Bed Mobility:  Rolling: Maximum assistance;Assist x2  Supine to Sit: Maximum assistance;Assist x2  Sit to Supine: Maximum assistance;Assist x2  Scooting: Maximum assistance;Assist x2    Transfers:  Sit to Stand: Maximum assistance;Assist x2      Balance:  Sitting: Intact; With support  Standing: Impaired; With support  Standing - Static: Poor;Constant support  Standing - Dynamic : Poor;Constant support    Therapeutic Exercises:   Exercise Sets Reps AROM AAROM PROM Self PROM Comments   Shoulder flex/ext   [] [] [x] []    Elbow flex/ext 1 10 [x] [] [] []    Wrist flex/ext   [] [] [] []       [] [] [] []          Pain:  Pt reported increased abdominal pain     Activity Tolerance:   Poor  Please refer to the flowsheet for vital signs taken during this treatment. After treatment patient left in no apparent distress:   Supine in bed, Call bell within reach, Bed / chair alarm activated, and Side rails x 3    COMMUNICATION/COLLABORATION:   The patients plan of care was discussed with: Physical therapy assistant and Registered nurse. Co-tx with PTA for increased safety for bed mobility and functional transfers.      Bill Marc  Time Calculation: 17 mins

## 2020-12-23 NOTE — PROGRESS NOTES
Progress Note      12/23/2020 8:33 AM  NAME: Vivi Hinds   MRN:  [id number]   Admit Diagnosis: [facility] bowel obstruction [facility] [P81.673]      Problem List:   -Hypertension  -Chest Pain  -Bowel obstruction  -Poor historian     Assessment/Plan:   -Patient is lying comfortably in the bed.  -Is a follow-up visit from cardiology as patient was having chest pain.  -She denies any chest pain or shortness of breath or any other anginal equivalent this morning. -EKG showed no acute changes but first-degree AV block.  -Cardiac enzyme x1 is unremarkable.  -Echocardiogram is pending.  -No acute cardiac issue based on my overall cardiac assessment and we will continue current conservative medical management at this time.  -Once echo results are available we will be able to address perioperative risk  -We will continue to follow while patient is in the hospital along with the team         []       High complexity decision making was performed in this patient at high risk for decompensation with multiple organ involvement. Subjective:     Vivi Hinds denies chest pain, dyspnea and lying comfortably in the bed. This is a follow-up visit from cardiology as patient was having chest pain as of yesterday. An EKG was obtained yesterday which shows normal sinus rhythm first-degree AV block and no acute EKG changes consistent with significant ischemia or injury pattern. First set of cardiac enzyme was unremarkable. Patient denies any chest pain or shortness of breath this morning. We will check echocardiogram and address perioperative risk assessment for noncardiac surgery if patient has to go for surgical intervention. We will continue to follow while patient is in the hospital along with the team.  Discussed with RN events overnight.      Review of Systems:    Symptom Y/N Comments  Symptom Y/N Comments   Fever/Chills N   Chest Pain N    Poor Appetite N   Edema N    Cough N   Abdominal Pain N    Sputum N   Joint Pain N    SOB/MAURO N   Pruritis/Rash N    Nausea/vomit N   Tolerating PT/OT Y    Diarrhea N   Tolerating Diet Y    Constipation N   Other       Could NOT obtain due to:      Objective:      Physical Exam:    Last 24hrs VS reviewed since prior progress note. Most recent are:    Visit Vitals  BP (!) 174/73   Pulse 74   Temp 98.2 °F (36.8 °C)   Resp 18   Ht 5' 3\" (1.6 m)   Wt 45.5 kg (100 lb 5 oz)   SpO2 98%   BMI 17.77 kg/m²       Intake/Output Summary (Last 24 hours) at 12/23/2020 9662  Last data filed at 12/23/2020 0451  Gross per 24 hour   Intake 100 ml   Output 300 ml   Net -200 ml        General Appearance: Well developed, well nourished, alert & oriented x 3,    no acute distress. Ears/Nose/Mouth/Throat: Hearing grossly normal.  Neck: Supple. Chest: Lungs clear to auscultation bilaterally. Cardiovascular: Regular rate and rhythm, S1S2 normal, no murmur. Abdomen: Soft, non-tender, bowel sounds are active. Extremities: No edema bilaterally. Skin: Warm and dry. []         Post-cath site without hematoma, bruit, tenderness, or thrill. Distal pulses intact. PMH/SH reviewed - no change compared to H&P    Data Review    Telemetry: normal sinus rhythm     EKG:   []  No new EKG for review  XR ABD (KUB)   Final Result   IMPRESSION: Small bowel gas without distention. CT ABD PELV WO CONT   Final Result   IMPRESSION:   1. The patient's undergone insertion of a nasogastric tube. The tube traverses   well beyond the pyloric channel and the tip of the tube is located within the   proximal jejunum. 2.  There has been a decrease in the degree of distention of the small bowel. There still is a moderate residual dilatation of the small bowel within the left   abdominal region with a relative decompression of the more distal small bowel. There is an apparent transition point (series 2 image number 81).  The small   bowel loops beyond this transition point to the right of midline are narrowed   and are associated with edema within their wall. The small bowel from this   transition point toward the cecum is decompressed. 3.  There is significant persistent fecal impaction within the rectal vault with   stercoral proctitis. XR ABD (KUB)   Final Result   Findings/impression: Persistent dilatation of small bowel consistent distal   small bowel obstruction. Enteric tube remains looped within the distal   esophagus. Advanced degenerative changes of the spine and left pelvic hardware   again noted. CT ABD PELV WO CONT   Final Result   IMPRESSION: High-grade mechanical small bowel obstruction, etiology   indeterminate, possibly adhesions but internal hernia is not excluded. Some   bowel contacts the right femoral vein, possibly from over i.e. panniculitis but   correlate with any clinical suspicion of femoral hernia. Gastric tube   incompletely visualized, coiled within the esophagus. Results called to [place] [id number] on 12/13/2020 5:40 PM.         XR ABD (KUB)   Final Result      CT ABD PELV WO CONT   Final Result   Impression:   1. High-grade small bowel obstruction with a transition point in the mid   abdomen involving the distal small bowel. No evidence of perforation. Small   amount of ascites. 2.  Small bilateral pleural effusions. 3.  Large volume of stool within the rectum consistent with constipation. 4.  Multiple bilateral hyperattenuating renal lesions, likely consistent with   proteinaceous or hemorrhagic cysts. If indicated, consider follow-up CT in 6   months. 5.  Coronary artery disease and atherosclerotic disease of the aorta. XR ABD ACUTE W 1 V CHEST   Final Result   Impression:   1. No acute cardiopulmonary process. Hyperlucent lungs could indicate COPD   changes. 2.  Dilated loop of bowel in the midabdomen, possibly small bowel. Recommend CT   of the abdomen and pelvis for further evaluation.            Lab Data Personally Reviewed:    Recent Labs 12/22/20  1555 12/21/20  0710   WBC 7.8 7.7   HGB 10.4* 9.9*   HCT 32.0* 30.3*    242     No results for input(s): INR, PTP, APTT, INREXT in the last 72 hours. Recent Labs     12/22/20  1555 12/21/20  0710    138   K 4.5 4.3    106   CO2 28 27   BUN 30* 25*   CREA 0.69 0.59   * 101*   CA 8.4* 8.0*   MG  --  2.3     Recent Labs     12/22/20  1555   TROIQ <0.05     No results found for: CHOL, CHOLX, CHLST, CHOLV, HDL, HDLP, LDL, LDLC, DLDLP, TGLX, TRIGL, TRIGP, CHHD, CHHDX    Recent Labs     12/22/20  1555   AP 69   TP 6.0*   ALB 2.4*   GLOB 3.6     No results for input(s): PH, PCO2, PO2 in the last 72 hours.     Medications Personally Reviewed:    Current Facility-Administered Medications   Medication Dose Route Frequency    amino acid 4.25 % dextrose 5 % with electrolytes (CLINIMIX E) infusion   IntraVENous CONTINUOUS    amino acid 4.25 % dextrose 5 % with electrolytes (CLINIMIX E) infusion   IntraVENous CONTINUOUS    labetaloL (NORMODYNE;TRANDATE) 20 mg/4 mL (5 mg/mL) injection 10 mg  10 mg IntraVENous TID PRN    NIFEdipine ER (PROCARDIA XL) tablet 60 mg  60 mg Oral DAILY    famotidine (PEPCID) tablet 20 mg  20 mg Oral Q48H    polyethylene glycol (MIRALAX) packet 17 g  17 g Oral DAILY    acetaminophen (TYLENOL) suppository 650 mg  650 mg Rectal Q4H PRN    ondansetron (ZOFRAN) injection 4 mg  4 mg IntraVENous Q6H PRN         Winston Galarza MD

## 2020-12-23 NOTE — PROGRESS NOTES
Problem: Falls - Risk of  Goal: *Absence of Falls  Description: Document [place] Led Fall Risk and appropriate interventions in the flowsheet.   Outcome: Progressing Towards Goal  Note: Fall Risk Interventions:  Mobility Interventions: Bed/chair exit alarm    Mentation Interventions: Bed/chair exit alarm    Medication Interventions: Patient to call before getting OOB, Bed/chair exit alarm    Elimination Interventions: Bed/chair exit alarm

## 2020-12-23 NOTE — PROGRESS NOTES
Length of stay skin note completed with Kelsie Valdez RN. Skin clean, dry and intact. Mepilex to sacrum for prophylaxis.

## 2020-12-23 NOTE — PROGRESS NOTES
PHYSICAL THERAPY TREATMENT  Patient: Nata Comer (66 y.o. female)  Date: 12/23/2020  Diagnosis: Small bowel obstruction ([facility] [Y77.871] <principal problem not specified>       Precautions:    Chart, physical therapy assessment, plan of care and goals were reviewed. ASSESSMENT  Patient continues with skilled PT services and is progressing towards goals. Patient demos increased pain and req increased motivation to participate with all mobility including supine therex. Pt was max A x2 for bed mobility and sit to stand transfers x2 trials. Patient stood with extremely flexed posture and post weight shift with inability to maintain standing more than a few seconds. Patient req constant encouragement and also req AAROM to perform LAQ and hip abd/add. Patient able to complete the mobility but req constant encouragement and pt rerported that she just did not want to do it. Pt also appeared to be in high pain when performing all mobility. Will continue to further progress towards PT goals as pt is able to participate. Current Level of Function Impacting Discharge (mobility/balance): weakness, lack of mobility    Other factors to consider for discharge: willingness to participate with mobility         PLAN :  Patient continues to benefit from skilled intervention to address the above impairments. Continue treatment per established plan of care. to address goals. Recommendation for discharge: (in order for the patient to meet his/her long term goals)  Therapy up to 5 days/week in SNF setting    This discharge recommendation:  Has been made in collaboration with the attending provider and/or case management    IF patient discharges home will need the following DME: to be determined (TBD)       SUBJECTIVE:   Patient stated I just dont want to. -when asked to perform LE therex.       OBJECTIVE DATA SUMMARY:   Critical Behavior:  Neurologic State: Alert, Confused  Orientation Level: Oriented to person  Cognition: Impaired decision making     Functional Mobility Training:  Bed Mobility:  Rolling: Maximum assistance;Assist x2  Supine to Sit: Maximum assistance;Assist x2  Sit to Supine: Maximum assistance;Assist x2  Scooting: Maximum assistance;Assist x2        Transfers:  Sit to Stand: Maximum assistance;Assist x2  Stand to Sit: Maximum assistance;Assist x2  Poor flexed posture, unsteady. Balance:  Sitting: Intact; With support  Standing: Impaired; With support  Standing - Static: Poor;Constant support  Standing - Dynamic : Poor;Constant support      Therapeutic Exercises:   Tried to encourage ambulation    Pain Rating:  Reported high pain in abdomen but did not rate number. Activity Tolerance:   Poor  Please refer to the flowsheet for vital signs taken during this treatment. After treatment patient left in no apparent distress:   Supine in bed and Call bell within reach    COMMUNICATION/COLLABORATION:   The patients plan of care was discussed with: Occupational therapy assistant. Cotx with SHAN for increased mobility and poor pt activity tolerance. Brooke Wilcox   Time Calculation: 17 mins         Problem: Mobility Impaired (Adult and Pediatric)  Goal: *Acute Goals and Plan of Care (Insert Text)  Description: Patient will move from supine to sit and sit to supine , scoot up and down, and roll side to side in bed with moderate assistance  within 7 day(s). Patient will transfer from bed to chair and chair to bed with moderate assistance  using the least restrictive device within 7 day(s). Patient will improve static standing balance to moderate assistance within 1 week(s). Patient will ambulate 10 feet with moderate assistance with least restrictive device within 1 weeks.        Outcome: Progressing Towards Goal

## 2020-12-23 NOTE — PROGRESS NOTES
Spiritual Care Assessment/Progress Note  [facility]      NAME: Felipe Bocanegra      MRN: [id number]  AGE: 80 y.o.  SEX: female  Restoration Affiliation: No preference   Language: English     12/23/2020     Total Time (in minutes): 6     Spiritual Assessment begun in [facility] through conversation with:         [x]Patient        [] Family    [] Friend(s)        Reason for Consult: Initial visit     Spiritual beliefs: (Please include comment if needed)     [] Identifies with a ben tradition:         [] Supported by a ben community:            [] Claims no spiritual orientation:           [] Seeking spiritual identity:                [] Adheres to an individual form of spirituality:           [x] Not able to assess:                           Identified resources for coping:      [] Prayer                               [] Music                  [] Guided Imagery     [] Family/friends                 [] Pet visits     [] Devotional reading                         [x] Unknown     [] Other:                                             Interventions offered during this visit: (See comments for more details)    Patient Interventions: Affirmation of emotions/emotional suffering, Coping skills reviewed/reinforced, Initial/Spiritual assessment, patient floor, Prayer (actual)           Plan of Care:     [] Support spiritual and/or cultural needs    [] Support AMD and/or advance care planning process      [] Support grieving process   [] Coordinate Rites and/or Rituals    [] Coordination with community clergy   [] No spiritual needs identified at this time   [] Detailed Plan of Care below (See Comments)  [] Make referral to Music Therapy  [] Make referral to Pet Therapy     [] Make referral to Addiction services  [] Make referral to [facility]  [] Make referral to Spiritual Care Partner  [] No future visits requested        [x] Follow up upon further referrals     Comments:  Visited the patient in 2 [facility] surgical during rounds. Only the patient was present during the visit. Patient stated she is doing well and was mostly quiet during the visit. Her voice was faint and she seemed to have difficulty carrying on conversation. I listened to the patient empathically and reflectively. I attempted to facilitated storytelling and offered to provide prayer which she accepted. I advised of  availability. Chaplains will follow up if further referrals are requested. Chaplain Elizabeth Ac M.Div.    can be reached by calling the  at [facility]  [phone]

## 2020-12-24 ENCOUNTER — APPOINTMENT (OUTPATIENT)
Dept: GENERAL RADIOLOGY | Age: 85
DRG: 388 | End: 2020-12-24
Attending: SURGERY
Payer: MEDICARE

## 2020-12-24 LAB
ANION GAP SERPL CALC-SCNC: 5 MMOL/L (ref 5–15)
BASOPHILS # BLD: 0 K/UL (ref 0–0.1)
BASOPHILS NFR BLD: 1 % (ref 0–1)
BUN SERPL-MCNC: 42 MG/DL (ref 6–20)
BUN/CREAT SERPL: 52 (ref 12–20)
CA-I BLD-MCNC: 8.1 MG/DL (ref 8.5–10.1)
CHLORIDE SERPL-SCNC: 105 MMOL/L (ref 97–108)
CO2 SERPL-SCNC: 25 MMOL/L (ref 21–32)
CREAT SERPL-MCNC: 0.81 MG/DL (ref 0.55–1.02)
DIFFERENTIAL METHOD BLD: ABNORMAL
EOSINOPHIL # BLD: 0 K/UL (ref 0–0.4)
EOSINOPHIL NFR BLD: 1 % (ref 0–7)
ERYTHROCYTE [DISTWIDTH] IN BLOOD BY AUTOMATED COUNT: 14.8 % (ref 11.5–14.5)
GLUCOSE BLD STRIP.AUTO-MCNC: 100 MG/DL (ref 65–100)
GLUCOSE BLD STRIP.AUTO-MCNC: 106 MG/DL (ref 65–100)
GLUCOSE BLD STRIP.AUTO-MCNC: 107 MG/DL (ref 65–100)
GLUCOSE SERPL-MCNC: 101 MG/DL (ref 65–100)
HCT VFR BLD AUTO: 27 % (ref 35–47)
HGB BLD-MCNC: 8.6 G/DL (ref 11.5–16)
IMM GRANULOCYTES # BLD AUTO: 0.1 K/UL (ref 0–0.04)
IMM GRANULOCYTES NFR BLD AUTO: 2 % (ref 0–0.5)
LYMPHOCYTES # BLD: 1 K/UL (ref 0.8–3.5)
LYMPHOCYTES NFR BLD: 22 % (ref 12–49)
MCH RBC QN AUTO: 26.2 PG (ref 26–34)
MCHC RBC AUTO-ENTMCNC: 31.9 G/DL (ref 30–36.5)
MCV RBC AUTO: 82.3 FL (ref 80–99)
MONOCYTES # BLD: 0.7 K/UL (ref 0–1)
MONOCYTES NFR BLD: 16 % (ref 5–13)
NEUTS SEG # BLD: 2.7 K/UL (ref 1.8–8)
NEUTS SEG NFR BLD: 61 % (ref 32–75)
PERFORMED BY, TECHID: ABNORMAL
PERFORMED BY, TECHID: ABNORMAL
PERFORMED BY, TECHID: NORMAL
PLATELET # BLD AUTO: 262 K/UL (ref 150–400)
PMV BLD AUTO: 10.4 FL (ref 8.9–12.9)
POTASSIUM SERPL-SCNC: 5.2 MMOL/L (ref 3.5–5.1)
RBC # BLD AUTO: 3.28 M/UL (ref 3.8–5.2)
SODIUM SERPL-SCNC: 135 MMOL/L (ref 136–145)
WBC # BLD AUTO: 4.4 K/UL (ref 3.6–11)

## 2020-12-24 PROCEDURE — 80048 BASIC METABOLIC PNL TOTAL CA: CPT

## 2020-12-24 PROCEDURE — 74011250636 HC RX REV CODE- 250/636: Performed by: NURSE PRACTITIONER

## 2020-12-24 PROCEDURE — 92610 EVALUATE SWALLOWING FUNCTION: CPT

## 2020-12-24 PROCEDURE — 65270000029 HC RM PRIVATE

## 2020-12-24 PROCEDURE — 74011250637 HC RX REV CODE- 250/637: Performed by: SURGERY

## 2020-12-24 PROCEDURE — 36415 COLL VENOUS BLD VENIPUNCTURE: CPT

## 2020-12-24 PROCEDURE — 97530 THERAPEUTIC ACTIVITIES: CPT

## 2020-12-24 PROCEDURE — 71045 X-RAY EXAM CHEST 1 VIEW: CPT

## 2020-12-24 PROCEDURE — 74011000258 HC RX REV CODE- 258: Performed by: SURGERY

## 2020-12-24 PROCEDURE — 85025 COMPLETE CBC W/AUTO DIFF WBC: CPT

## 2020-12-24 PROCEDURE — 74011000250 HC RX REV CODE- 250: Performed by: SURGERY

## 2020-12-24 PROCEDURE — 82962 GLUCOSE BLOOD TEST: CPT

## 2020-12-24 PROCEDURE — 74011250636 HC RX REV CODE- 250/636: Performed by: COLON & RECTAL SURGERY

## 2020-12-24 RX ORDER — DICYCLOMINE HYDROCHLORIDE 10 MG/1
10 CAPSULE ORAL 4 TIMES DAILY
Status: DISCONTINUED | OUTPATIENT
Start: 2020-12-24 | End: 2020-12-31 | Stop reason: HOSPADM

## 2020-12-24 RX ORDER — LOSARTAN POTASSIUM 50 MG/1
50 TABLET ORAL DAILY
Status: DISCONTINUED | OUTPATIENT
Start: 2020-12-25 | End: 2020-12-31 | Stop reason: HOSPADM

## 2020-12-24 RX ORDER — OXYCODONE HCL 5 MG/5 ML
5 SOLUTION, ORAL ORAL
Status: DISCONTINUED | OUTPATIENT
Start: 2020-12-24 | End: 2020-12-31 | Stop reason: HOSPADM

## 2020-12-24 RX ADMIN — ASCORBIC ACID, VITAMIN A PALMITATE, CHOLECALCIFEROL, THIAMINE HYDROCHLORIDE, RIBOFLAVIN-5 PHOSPHATE SODIUM, PYRIDOXINE HYDROCHLORIDE, NIACINAMIDE, DEXPANTHENOL, ALPHA-TOCOPHEROL ACETATE, VITAMIN K1, FOLIC ACID, BIOTIN, CYANOCOBALAMIN: 200; 3300; 200; 6; 3.6; 6; 40; 15; 10; 150; 600; 60; 5 INJECTION, SOLUTION INTRAVENOUS at 22:47

## 2020-12-24 RX ADMIN — ONDANSETRON 4 MG: 2 INJECTION INTRAMUSCULAR; INTRAVENOUS at 20:41

## 2020-12-24 RX ADMIN — LABETALOL HYDROCHLORIDE 10 MG: 5 INJECTION, SOLUTION INTRAVENOUS at 01:18

## 2020-12-24 RX ADMIN — DICYCLOMINE HYDROCHLORIDE 10 MG: 10 CAPSULE ORAL at 18:43

## 2020-12-24 RX ADMIN — DICYCLOMINE HYDROCHLORIDE 10 MG: 10 CAPSULE ORAL at 22:47

## 2020-12-24 NOTE — PROGRESS NOTES
Problem: Self Care Deficits Care Plan (Adult)  Goal: *Acute Goals and Plan of Care (Insert Text)  Description: Pt will be SBA sup <> sit in prep for EOB ADLs  Pt will be SBA grooming sitting EOB  Pt will be min A LE dressing sitting EOB/long sit  Pt will be min A sit <>  prep for toileting LRAD  Pt will be min A toileting/toilet transfer/cloth mgmt LRAD  Pt will be min A following UE HEP in prep for self care tasks  Outcome: Progressing Towards Goal     OCCUPATIONAL THERAPY TREATMENT  Patient: Lalit Varela (30 y.o. female)  Date: 12/24/2020  Diagnosis: [facility] bowel [facility] ([facility] [K56.609] <principal problem not specified>       Precautions:    Chart, occupational therapy assessment, plan of care, and goals were reviewed. ASSESSMENT  Pt received semi supine in bed agreeable for OT tx. Pt oriented to self today and pleasantly confused. PT requiring mod/max A for errol UE HEP for 1 set of 10 reps in all planes in prep for self care tasks. Pt min A simple grooming bed level with assist for thoroughness. Pt coughing with OT present thus nursing notified and aware and came to check on patient right away. Pt left semi supine in bed with call light within read and bed alarm on and in lowest position. Pt would benefit from continued skilled OT services while at [facility] in order to increase safety and independence with self care and functional transfers/mobility. Recommend discharge to SNF when medicially appropriate. Other factors to consider for discharge: time since onset, severity of deficits         PLAN :  Patient continues to benefit from skilled intervention to address the above impairments. Continue treatment per established plan of care. to address goals.     Recommend with staff: bed level self care    Recommend next OT session: bed level self care    Recommendation for discharge: (in order for the patient to meet his/her long term goals)  SNF    This discharge recommendation:  Has been made in collaboration with the attending provider and/or case management    IF patient discharges home will need the following DME: TBD       SUBJECTIVE:   Patient stated Kimberly Villafana when asked if pt knew where she is. OBJECTIVE DATA SUMMARY:   Cognitive/Behavioral Status:  Neurologic State: Alert;Confused  Orientation Level: Oriented to person;Disoriented to place; Disoriented to time;Disoriented to situation  Cognition: Impaired decision making     ADL Intervention:     Grooming  Grooming Assistance: Minimum assistance  Position Performed: (semi supine in bed)  Washing Face: Minimum assistance       Therapeutic Exercises:   mod/max A for errol UE HEP for 1 set of 10 reps in all planes in prep for self care tasks. Pain:  No pain reported    Activity Tolerance:   Fair and requires rest breaks  Please refer to the flowsheet for vital signs taken during this treatment. After treatment patient left in no apparent distress:   Supine in bed, Call bell within reach, Bed / chair alarm activated, and Side rails x 3    COMMUNICATION/COLLABORATION:   The patients plan of care was discussed with: Registered nurse.      Charles Correa  Time Calculation: 13 mins

## 2020-12-24 NOTE — PROGRESS NOTES
Comprehensive Nutrition Assessment    Type and Reason for Visit: Reassess(Interim)    Nutrition Recommendations/Plan:     Continue NDD2/HTL diet     Rec'd SLP eval to determine LRD for discharge  Rec'd d/c High Fiber diet d/t hx of multiple SBOs - Low fiber may be beneficial     Adjust Ensure Compact to Ensure Enlive BID thickened to HTL  Add Ensure pdg BID    Wean off PPN    Nursing to document %meal and supplement intakes in I/Os  Obtain updated measured wt    Nutrition Assessment:  Admitted for abd pain, n/v. COVID negative. Dx SBO, confirmed on previous scans. NGT dc'd 12/18, having BMs. F/u KUB (12/21) showed small bowel gas without distention. Per MD notes, pt is back to baseline mentation and more alert. Pt declined surgical intervention, requested DNR status and d/c planning in progress. Previously NPO/CLx10. On PPN since NPO day 5. MD Surg appreciative of RD recs, PPN at goal. Advanced to Full Liquids 12/22, however pt with poor appetite per chart review. While on Full Liq, intakes avg ~50%. Recorded in EMR as follows: (12/21) 50%; (12/22) 75, 0, 25%; (12/23)  80, 30, 90%. Advanced to Ground/HTL diet today per MD, no SLP eval/consult noted - rec'd prior to d/c for LRD recs. Nsg today relayed pt was originally NPO this AM d/t possible surg workup however will monitor intake at lunch meal. Pt resting at time of visit, did not disturb. Will modify supplements and f/u. Labs: H/H: 8.6/27, Na 135, K 5.2, BUN 42, , POC , Ca 8.1. Meds: bentyl, oxycodone, pepcid, miralax, zofran. Malnutrition Assessment:  Malnutrition Status:  Mild malnutrition(RD suspects higher degree of malnutrition)    Context:  Chronic illness       Estimated Daily Nutrient Needs:  Energy (kcal): 1275kcal (28kcal/kg); Weight Used for Energy Requirements: Current  Protein (g): 55g (1.2g/kg);  Weight Used for Protein Requirements: Current  Fluid (ml/day): 1275mL; Method Used for Fluid Requirements: 1 ml/kcal      Nutrition Related Findings:  NFPE finding mild and severe muscle wasting. NGT dc'd. Unclear if pt with dysphagia hx. Ordered dysphagia diet per MD today. No n/v. Continues to have BMs. Last one documented 12/23. No edema. Wounds:    None       Current Nutrition Therapies:  DIET NUTRITIONAL SUPPLEMENTS Breakfast, Lunch, Dinner; Ensure Compact  amino acid 4.25 % dextrose 5 % with electrolytes (CLINIMIX E) infusion  DIET MECHANICAL SOFT 2 GM NA (House Low NA); 3 Honey/3 Moderately Thick; High Fiber  amino acid 4.25 % dextrose 5 % with electrolytes (CLINIMIX E) infusion  Current Parenteral Nutrition Orders:  · Type and Formula: Standard PPN   · Lipids: 250ml, Daily  · Duration: Continuous  · Rate/Volume: 60mL/hr  · Current PN Order Provides: 989kcal, 61g pro  · Goal PN Orders Provides: At goal      Anthropometric Measures:  · Height:  5' 2.99\" (160 cm)  · Current Body Wt:  45.5 kg (100 lb 5 oz)(12/20)   · Admission Body Wt:  110 lb 3.7 oz(Est)    · Usual Body Wt:  (ANNIE)     · Ideal Body Wt:  115 lbs:  87.2 %   · BMI Category:  Underweight (BMI less than 18.5)     Wt hx: 45.5kg (12/20), 45.1kg (12/18), 50kg (12/15-admit).     Nutrition Diagnosis:   · Inadequate oral intake related to inadequate protein-energy intake as evidenced by nutrition support-parenteral nutrition, intake 26-50%, BMI    Nutrition Interventions:   Food and/or Nutrient Delivery: Continue current diet, Modify oral nutrition supplement, Discontinue parenteral nutrition  Nutrition Education and Counseling: No recommendations at this time  Coordination of Nutrition Care: Continue to monitor while inpatient, Speech therapy, Swallow evaluation    Goals:  Meet >75% EENs via PO vs PN (progressing)  Tolerating oral diet in 7 days progressing)  Lytes WNL (not progressing)  Maintain bowel fx  (progressing)    Nutrition Monitoring and Evaluation:   Behavioral-Environmental Outcomes: None identified  Food/Nutrient Intake Outcomes: Diet advancement/tolerance, Food and nutrient intake, Supplement intake  Physical Signs/Symptoms Outcomes: Biochemical data, Chewing or swallowing, Weight, GI status    Discharge Planning:    Continue oral nutrition supplement     Electronically signed by Eduarda Henley on 12/24/2020 at 1:25 PM    Contact: [phone]

## 2020-12-24 NOTE — PROGRESS NOTES
Progress Note  Date:2020       Room:  Patient Fransisco Quiñonez     YOB: 1927     Age:89 y.o. Subjective    Subjective:  Symptoms:  Stable. She reports cough and diarrhea. No shortness of breath or chest pain. Diet:  Poor intake. No nausea or vomiting. Activity level: Impaired due to weakness. Pain:  She complains of pain that is mild. She reports pain is improving. Pain is partially controlled. Review of Systems   Constitutional: Negative for chills and fever. Respiratory: Positive for cough. Negative for shortness of breath. Cardiovascular: Negative for chest pain. Gastrointestinal: Positive for abdominal pain and diarrhea. Negative for abdominal distention, blood in stool, constipation, nausea and vomiting. Genitourinary: Negative. Musculoskeletal: Negative. Skin: Negative. Hematological: Negative. Objective         Vitals Last 24 Hours:  TEMPERATURE:  Temp  Av.7 °F (37.1 °C)  Min: 97.7 °F (36.5 °C)  Max: 99.9 °F (37.7 °C)  RESPIRATIONS RANGE: Resp  Av.4  Min: 18  Max: 20  PULSE OXIMETRY RANGE: SpO2  Av.2 %  Min: 93 %  Max: 98 %  PULSE RANGE: Pulse  Av  Min: 77  Max: 113  BLOOD PRESSURE RANGE: Systolic (96RVQ), ZZO:236 , Min:119 , ZTH:529   ; Diastolic (05WSP), UHB:24, Min:61, Max:89    I/O (24Hr): Intake/Output Summary (Last 24 hours) at 2020 1014  Last data filed at 2020 0616  Gross per 24 hour   Intake 1510 ml   Output    Net 1510 ml     Objective:  General Appearance:  Comfortable and in no acute distress. Vital signs: (most recent): Blood pressure (!) 151/73, pulse 77, temperature 97.7 °F (36.5 °C), resp. rate 18, height 5' 2.99\" (1.6 m), weight 45.5 kg (100 lb 5 oz), SpO2 98 %. No fever. (Systolic hypertension noted). Lungs:  Normal effort and normal respiratory rate. Breath sounds clear to auscultation. Heart: Normal rate. Regular rhythm.     Abdomen: Abdomen is soft and non-distended. (Midline tenderness noted at the superior aspect of her infraumbilical scar. No peritoneal signs. ). Pulses: Distal pulses are intact. Neurological: Patient is alert. Skin:  Warm and dry. Labs/Imaging/Diagnostics    Labs:  CBC:  Recent Labs     12/24/20  0850 12/22/20  1555   WBC 4.4 7.8   RBC 3.28* 3.90   HGB 8.6* 10.4*   HCT 27.0* 32.0*   MCV 82.3 82.1   RDW 14.8* 14.7*    263     CHEMISTRIES:  Recent Labs     12/24/20  0850 12/22/20  1555   * 137   K 5.2* 4.5    105   CO2 25 28   BUN 42* 30*   CA 8.1* 8.4*   PT/INR:No results for input(s): INR, INREXT in the last 72 hours. No lab exists for component: PROTIME  APTT:No results for input(s): APTT in the last 72 hours. LIVER PROFILE:  Recent Labs     12/22/20  1555   AST 30   ALT 41     Lab Results   Component Value Date/Time    ALT (SGPT) 41 12/22/2020 03:55 PM    AST (SGOT) 30 12/22/2020 03:55 PM    Alk. phosphatase 69 12/22/2020 03:55 PM    Bilirubin, total 0.4 12/22/2020 03:55 PM       Imaging Last 24 Hours:  Ct Abd Pelv Wo Cont    Result Date: 12/24/2020  CT abdomen and pelvis without IV contrast Comparison CT abdomen and pelvis December 17, 2020. Axial images are reviewed along with reformatted sagittal/coronal images. No IV contrast administered. Dose reduction: All CT scans at this facility are performed using dose reduction optimization techniques as appropriate to a performed exam including the following- automated exposure control, adjustments of mA and/or Kv according to patient size, or use of iterative reconstructive technique. Small dependent right pleural effusion. Liver, pancreas, spleen, bilateral adrenal glands, bilateral kidneys unchanged. Gallbladder nondistended. Oral contrast and air through distended stomach. Oral contrast filled distended small bowel loops to the level pelvis. Exact transition point difficult to define; suspect this is over segment of ileum.  Some oral contrast, stool, and air present through colon. Colonic diverticula present, no CT evidence for diverticulitis. No ascites. Atherosclerotic change normal caliber abdominal aorta with extension to pelvic arteries. Left hip hardware. IMPRESSION: Partial small bowel obstruction; persistent from CT abdomen and pelvis 12/17/2020. Echo Adult Complete    Result Date: 12/23/2020  · LV: Estimated LVEF is 65 - 70%. Normal cavity size and wall thickness. Hyperdynamic systolic function. · Image quality for this study was technically difficult. · LA: Mildly dilated left atrium. · AV: Aortic valve leaflet calcification present. · MV: Mild mitral annular calcification. · TV: Mild tricuspid valve regurgitation is present. · PA: Pulmonary arterial systolic pressure is 63 mmHg. · Pericardium: Trivial-to-small pericardial effusion adjacent to right ventricle. Effusion is fibrinous. Assessment//Plan   Active Problems:    Small bowel obstruction ([address] (12/11/2020)      Assessment:    Condition: In stable condition. Improving. (Overnight imaging reviewed independently and with radiologist. There are points of narrowing in the small bowel, with mild to moderate distention of proximal small bowel (approximately 3.5cm) and stomach. However, contrast passes into the distal small bowel and colon, and there is air and stool in her rectum. Patient denies nausea, had 3 loose stools overnight, is tolerating a liquid diet though her intake is suboptimal. In discussing options with patient, she indicates that she does not want surgery, and understands this means that she will need to stay on a diet that is predominantly liquid and mechanical soft. She also decided that she wants to be DNR. Plan will therefore be to try to wean her from PPN in anticipation of discharge to SNF or IRF. Will start her on bentyl today for her intermittent colicky pain, and also use liquid oxycodone for breakthrough pain.  Will initiate inpatient DNR orders, as well as help her to complete Durable DNR paperwork for when she is discharged. Advance diet to mechanical soft, with Ensure supplement available at each meal and as desired. Will decrease PPN rate to half to complete current bag, then discontinue. Case discussed with Social Work to assist with discharge planning.). Plan:   Diet Plan: Mechanical soft.         Electronically signed by Dion Vivar DO on 12/24/2020 at 10:14 AM

## 2020-12-24 NOTE — PROGRESS NOTES
[facility]EL[facility]ame is requesting PT/OT notes for patient and is still pending review for acceptance    Notes uploaded per request.    CM will continue to follow for any additional discharge planning needs.

## 2020-12-24 NOTE — PROGRESS NOTES
Problem: Dysphagia (Adult)  Goal: *Acute Goals and Plan of Care (Insert Text)  Description: Speech Therapy Goals  Initiated 12/24/2020  -Patient will tolerate d2 diet with nectar thick liquids without signs/symptoms of aspiration given minimal cues within 7 day(s). [ ] Not met  [ ]  MET   [ ] Progressing  [ ] Thad Jones  -Patient will tolerate trials of diet upgrade with SLP only without overt s/s aspiration within 7 days. [ ] Not met  [ ]  MET   [ ] Progressing  [ ] Thad Jones  -Patient will demonstrate understanding of swallow safety precautions and aspiration precautions, diet recs with minimal cues within 7 day(s). [ ] Not met  [ ]  MET   [ ] Progressing  [ ] Discontinue  Outcome: Not Met   SPEECH [facility]  Patient: Talisha Almendarez (35 y.o. female)  Date: 12/24/2020  Primary Diagnosis: Small bowel obstruction ([address] [U39.313]        Precautions: aspiration       ASSESSMENT :  Based on the objective data described below, the patient presents with mild-mod oropharyngeal dysphagia. Oral phase c/b reduced strength, discoordination, delayed a-p propulsion and reduced bolus acceptance. Pharyngeal phase with persistent delay, appears WFL to palpation once initiated. Concerns for GI dysfunction leading to possible aspiration are present. Nsg reports pt with s/s aspiration c/b significant congestion following noon meal are present. Pt was previously on full liquids and made NPO by MD previously this date for possible surgical intervention, diet was advanceddue to pt declining surgery per nsg. Pt admitted with SBO, has been on full liquids diet, has been having liquid stools per nsg. STAT CXR is pending. Patient will benefit from skilled intervention to address the above impairments.   Patients rehabilitation potential is considered to be Fair     PLAN :  Recommendations and Planned Interventions:  Pt's oropharyngeal sw function appear appropriate to cont d2 diet with liquid upgrade to nectar. However, concerns for GI function are present. Recommend 1:1 assistance with ALL PO intake, STRICT aspiration and GERD precautions, monitor pt closely for s/s aspiration, meds crushed if able in puree, FEED ONLY IF AWAKE AND ALERT. Frequency/Duration: Patient will be followed by speech-language pathology 4 times a week to address goals. Discharge Recommendations: To Be Determined     SUBJECTIVE:   Patient alert, somewhat agreeable, unclear mentation due to reduced responsiveness to questions. OBJECTIVE:     CXR Results  (Last 48 hours)      None           CT Results  (Last 48 hours)                 12/23/20 2133  CT ABD PELV WO CONT Final result    Impression:  IMPRESSION: Partial small bowel obstruction; persistent from CT abdomen and   pelvis 12/17/2020. Narrative:  CT abdomen and pelvis without IV contrast       Comparison CT abdomen and pelvis December 17, 2020. Axial images are reviewed along with reformatted sagittal/coronal images. No IV   contrast administered. Dose reduction: All CT scans at this facility are performed using dose reduction   optimization techniques as appropriate to a performed exam including the   following-   automated exposure control, adjustments of mA and/or Kv according to patient   size, or use of iterative reconstructive technique. Small dependent right pleural effusion. Liver, pancreas, spleen, bilateral adrenal glands, bilateral kidneys unchanged. Gallbladder nondistended. Oral contrast and air through distended stomach. Oral contrast filled distended small bowel loops to the level pelvis. Exact   transition point difficult to define; suspect this is over segment of ileum. Some oral contrast, stool, and air present through colon. Colonic diverticula   present, no CT evidence for diverticulitis. No ascites.        Atherosclerotic change normal caliber abdominal aorta with extension to pelvic arteries. Left hip hardware. Past Medical History:   Diagnosis Date    Hypertension    History reviewed. No pertinent surgical history. Prior Level of Function/Home Situation:   Home Situation  Home Environment: Private residence  One/Two Story Residence: One story  Living Alone: Yes  Support Systems: Home care staff  Patient Expects to be Discharged to[de-identified] Skilled nursing facility  Current DME Used/Available at Home: Walker, rolling  Tub or Shower Type: Tub/Shower combination  Diet prior to admission: presumed regular/thin  Current Diet:  d2/honey   Cognitive and Communication Status:  Orientation Level: Oriented to person  Cognition: Decreased command following    Swallowing Evaluation:   Oral Assessment:  Oral Assessment  Labial: Decreased seal  Dentition: Limited  Oral Hygiene: appears WFL  Lingual: Decreased strength  Velum: Unable to visualize  P.O. Trials:  Patient Position: upright in bed  Vocal quality prior to P.O.: Low volume  Consistency Presented: Nectar thick liquid;Puree; Solid; Thin liquid  How Presented: SLP-fed/presented;Cup/gulp; Spoon;Straw     Bolus Acceptance: Impaired  Bolus Formation/Control: Impaired  Type of Impairment: Delayed     Oral Residue: None  Initiation of Swallow: Delayed (# of seconds)  Laryngeal Elevation: Functional  Aspiration Signs/Symptoms: None    Oral Phase Severity: Moderate  Pharyngeal Phase Severity : Mild-moderate  Voice:    Vocal Quality: Low volume      Pain:  0    After treatment:   Patient left in no apparent distress in bed, Call bell within reach, and Nursing notified    COMMUNICATION/EDUCATION:   Patient was educated regarding purpose of SLP assessment, POC, diet recs and sw safety precautions. Patient demonstrated [address] understanding as evidenced by verbal responsiveness. The patient's plan of care including recommendations, planned interventions, and recommended diet changes were discussed with: Registered nurse.      Patient/family agree to work toward stated goals and plan of care.     Thank you for this referral.  Sandie Christine M.S. CCC-SLP  Time Calculation: 9 mins

## 2020-12-24 NOTE — PROGRESS NOTES
Hospitalist Progress Note    Subjective:   Daily Progress Note: 12/24/2020 2:26 PM    Reason for consult:   Patient is a 89-year-old female with history of hypertension and previous abdominal surgeries who was admitted on 12/11/2020 due to nausea, vomiting and abdominal pain.  KUB showed dilated loops of bowel in the mid abdomen possibly small bowel obstruction.  She was started on NG tube to suction for decompression and kept n.p.o.  Blood pressures found to be elevated so internal medicine invited to to assist with management of hypertension.  Subjective:  Follow-up examination of patient at the bedside.  She remains profoundly weak after extended hospitalization.  Continue to wait for complete return of bowel function.  She has refused surgery and has made herself DNR.  Attempting to wean off of PPN.  Case management is working on SNF placement.    Current Facility-Administered Medications   Medication Dose Route Frequency   • dicyclomine (BENTYL) capsule 10 mg  10 mg Oral QID   • oxyCODONE (ROXICODONE) 5 mg/5 mL oral solution 5 mg  5 mg Oral Q6H PRN   • amino acid 4.25 % dextrose 5 % with electrolytes (CLINIMIX E) infusion   IntraVENous CONTINUOUS   • [START ON 12/25/2020] losartan (COZAAR) tablet 50 mg  50 mg Oral DAILY   • amino acid 4.25 % dextrose 5 % with electrolytes (CLINIMIX E) infusion   IntraVENous CONTINUOUS   • labetaloL (NORMODYNE;TRANDATE) 20 mg/4 mL (5 mg/mL) injection 10 mg  10 mg IntraVENous TID PRN   • NIFEdipine ER (PROCARDIA XL) tablet 60 mg  60 mg Oral DAILY   • famotidine (PEPCID) tablet 20 mg  20 mg Oral Q48H   • polyethylene glycol (MIRALAX) packet 17 g  17 g Oral DAILY   • acetaminophen (TYLENOL) suppository 650 mg  650 mg Rectal Q4H PRN   • ondansetron (ZOFRAN) injection 4 mg  4 mg IntraVENous Q6H PRN        REVIEW OF SYSTEMS    Review of Systems   Constitutional: Positive for malaise/fatigue and weight loss.   Gastrointestinal: Positive for abdominal pain and diarrhea.   Neurological:  Positive for weakness. Objective:     Visit Vitals  BP (!) 154/76 (BP 1 Location: Right arm, BP Patient Position: At rest)   Pulse 66   Temp 98.6 °F (37 °C)   Resp 18   Ht 5' 2.99\" (1.6 m)   Wt 45.5 kg (100 lb 5 oz)   SpO2 98%   BMI 17.77 kg/m²      O2 Device: Room air    Temp (24hrs), Av.8 °F (37.1 °C), Min:97.7 °F (36.5 °C), Max:99.9 °F (37.7 °C)      No intake/output data recorded.  1901 -  0700  In: 1510 [I.V.:1510]  Out: 300 [Urine:300]    PHYSICAL EXAM:    Physical Exam  Constitutional:       Appearance: She is ill-appearing. Cardiovascular:      Rate and Rhythm: Normal rate. Pulmonary:      Effort: Pulmonary effort is normal.   Abdominal:      Tenderness: There is abdominal tenderness. Neurological:      Mental Status: Mental status is at baseline. Motor: Weakness present. Coordination: Coordination abnormal.      Gait: Gait abnormal.          Data Review    Recent Results (from the past 24 hour(s))   GLUCOSE, POC    Collection Time: 20  3:38 PM   Result Value Ref Range    Glucose (POC) 117 (H) 65 - 100 mg/dL    Performed by [address] [place] [address], POC    Collection Time: 20  7:37 AM   Result Value Ref Range    Glucose (POC) 106 (H) 65 - 100 mg/dL    Performed by [facility]    CBC WITH AUTOMATED DIFF    Collection Time: 20  8:50 AM   Result Value Ref Range    WBC 4.4 3.6 - 11.0 K/uL    RBC 3.28 (L) 3.80 - 5.20 M/uL    HGB 8.6 (L) 11.5 - 16.0 g/dL    HCT 27.0 (L) 35.0 - 47.0 %    MCV 82.3 80.0 - 99.0 FL    MCH 26.2 26.0 - 34.0 PG    MCHC 31.9 30.0 - 36.5 g/dL    RDW 14.8 (H) 11.5 - 14.5 %    PLATELET 817 241 - 500 K/uL    MPV 10.4 8.9 - 12.9 FL    NEUTROPHILS 61 32 - 75 %    LYMPHOCYTES 22 12 - 49 %    MONOCYTES 16 (H) 5 - 13 %    EOSINOPHILS 1 0 - 7 %    BASOPHILS 1 0 - 1 %    IMMATURE GRANULOCYTES 2 (H) 0.0 - 0.5 %    ABS. NEUTROPHILS 2.7 1.8 - 8.0 K/UL    ABS. LYMPHOCYTES 1.0 0.8 - 3.5 K/UL    ABS. MONOCYTES 0.7 0.0 - 1.0 K/UL    ABS.  EOSINOPHILS 0.0 0.0 - 0.4 K/UL    ABS. BASOPHILS 0.0 0.0 - 0.1 K/UL    ABS. IMM. GRANS. 0.1 (H) 0.00 - 0.04 K/UL    DF AUTOMATED     METABOLIC PANEL, BASIC    Collection Time: 12/24/20  8:50 AM   Result Value Ref Range    Sodium 135 (L) 136 - 145 mmol/L    Potassium 5.2 (H) 3.5 - 5.1 mmol/L    Chloride 105 97 - 108 mmol/L    CO2 25 21 - 32 mmol/L    Anion gap 5 5 - 15 mmol/L    Glucose 101 (H) 65 - 100 mg/dL    BUN 42 (H) 6 - 20 mg/dL    Creatinine 0.81 0.55 - 1.02 mg/dL    BUN/Creatinine ratio 52 (H) 12 - 20      GFR est AA >60 >60 ml/min/1.73m2    GFR est non-AA >60 >60 ml/min/1.73m2    Calcium 8.1 (L) 8.5 - 10.1 mg/dL   GLUCOSE, POC    Collection Time: 12/24/20 12:10 PM   Result Value Ref Range    Glucose (POC) 107 (H) 65 - 100 mg/dL    Performed by Rose Marie Castillo        CT ABD PELV WO CONT   Final Result   IMPRESSION: Partial small bowel obstruction; persistent from CT abdomen and   pelvis 12/17/2020. XR ABD (KUB)   Final Result   IMPRESSION: Small bowel gas without distention. CT ABD PELV WO CONT   Final Result   IMPRESSION:   1. The patient's undergone insertion of a nasogastric tube. The tube traverses   well beyond the pyloric channel and the tip of the tube is located within the   proximal jejunum. 2.  There has been a decrease in the degree of distention of the small bowel. There still is a moderate residual dilatation of the small bowel within the left   abdominal region with a relative decompression of the more distal small bowel. There is an apparent transition point (series 2 image number 81). The small   bowel loops beyond this transition point to the right of midline are narrowed   and are associated with edema within their wall. The small bowel from this   transition point toward the cecum is decompressed. 3.  There is significant persistent fecal impaction within the rectal vault with   stercoral proctitis.             XR ABD (KUB)   Final Result   Findings/impression: Persistent dilatation of small bowel consistent distal   small bowel obstruction. Enteric tube remains looped within the distal   esophagus. Advanced degenerative changes of the spine and left pelvic hardware   again noted. CT ABD PELV WO CONT   Final Result   IMPRESSION: High-grade mechanical small bowel obstruction, etiology   indeterminate, possibly adhesions but internal hernia is not excluded. Some   bowel contacts the right femoral vein, possibly from over i.e. panniculitis but   correlate with any clinical suspicion of femoral hernia. Gastric tube   incompletely visualized, coiled within the esophagus. Results called to [facility] on 12/13/2020 5:40 PM.         XR ABD (KUB)   Final Result      CT ABD PELV WO CONT   Final Result   Impression:   1. High-grade small bowel obstruction with a transition point in the mid   abdomen involving the distal small bowel. No evidence of perforation. Small   amount of ascites. 2.  Small bilateral pleural effusions. 3.  Large volume of stool within the rectum consistent with constipation. 4.  Multiple bilateral hyperattenuating renal lesions, likely consistent with   proteinaceous or hemorrhagic cysts. If indicated, consider follow-up CT in 6   months. 5.  Coronary artery disease and atherosclerotic disease of the aorta. XR ABD ACUTE W 1 V CHEST   Final Result   Impression:   1. No acute cardiopulmonary process. Hyperlucent lungs could indicate COPD   changes. 2.  Dilated loop of bowel in the midabdomen, possibly small bowel. Recommend CT   of the abdomen and pelvis for further evaluation. Active Problems:    Small bowel obstruction ([address] (12/11/2020)        Assessment/Plan:     1.   Small bowel obstruction: Resolving  · Likely secondary to adhesions from previous abdominal surgeries  · Repeat CT of abdomen showed there has been a decrease in the degree of distention of the small bowel, with a moderate residual dilatation of the small bowel  · She is on PPN/IL for nutrition ( managed by surgery)wean as tolerated  · Upgraded to mechanical soft diet  · Functioning returning slowly, positive flatus, positive stool  · refuses surgical intervention     2. Acute dehydration: Improving  · Secondary to poor oral intake  · IV fluids off, receiving PPN     3.  Hypertension: Stable  · Blood pressures are acceptable  · Blood pressure this morning is 130/66  · Continue Nifedipine 60 mg daily PO, added losartan 50 mg/day     4. Metabolic encephalopathy: Resolved  · Secondary to dehydration. · She is  now back to her baseline mental status  · Continue PPN nutritionwean as tolerating p.o. diet     DVT Prophylaxis: SCDs    ______________________________________________________________________________  Time spent in direct care including coordination of service, review of data and examination: > 35 minutes  Care Plan discussed with: RN and patient  ______________________________________________________________________________    Lenka Ríos NP    This is dictation was done by dragon, computer voice recognition software. Quite often unanticipated grammatical, syntax, homophones and other interpretive errors or inadvertently transcribed by the computer software. Please excuse errors that have escaped final proofreading. Thank you.

## 2020-12-25 LAB
GLUCOSE BLD STRIP.AUTO-MCNC: 101 MG/DL (ref 65–100)
PERFORMED BY, TECHID: ABNORMAL

## 2020-12-25 PROCEDURE — 74011250637 HC RX REV CODE- 250/637: Performed by: SURGERY

## 2020-12-25 PROCEDURE — 74011250637 HC RX REV CODE- 250/637: Performed by: NURSE PRACTITIONER

## 2020-12-25 PROCEDURE — 65270000029 HC RM PRIVATE

## 2020-12-25 PROCEDURE — 82962 GLUCOSE BLOOD TEST: CPT

## 2020-12-25 PROCEDURE — 74011250637 HC RX REV CODE- 250/637: Performed by: COLON & RECTAL SURGERY

## 2020-12-25 RX ADMIN — DICYCLOMINE HYDROCHLORIDE 10 MG: 10 CAPSULE ORAL at 09:37

## 2020-12-25 RX ADMIN — DICYCLOMINE HYDROCHLORIDE 10 MG: 10 CAPSULE ORAL at 19:30

## 2020-12-25 RX ADMIN — NIFEDIPINE 60 MG: 30 TABLET, FILM COATED, EXTENDED RELEASE ORAL at 09:37

## 2020-12-25 RX ADMIN — LOSARTAN POTASSIUM 50 MG: 50 TABLET, FILM COATED ORAL at 09:37

## 2020-12-25 RX ADMIN — POLYETHYLENE GLYCOL 3350 17 G: 17 POWDER, FOR SOLUTION ORAL at 09:37

## 2020-12-25 RX ADMIN — DICYCLOMINE HYDROCHLORIDE 10 MG: 10 CAPSULE ORAL at 13:26

## 2020-12-25 RX ADMIN — FAMOTIDINE 20 MG: 20 TABLET ORAL at 09:37

## 2020-12-25 NOTE — PROGRESS NOTES
Hospitalist Progress Note    Subjective:   Daily Progress Note: 2020 2:26 PM    Reason for consult:   Patient is a 24-year-old female with history of hypertension and previous abdominal surgeries who was admitted on 2020 due to nausea, vomiting and abdominal pain. KUB showed dilated loops of bowel in the mid abdomen possibly small bowel obstruction. She was started on NG tube to suction for decompression and kept n.p.o. Blood pressures found to be elevated so internal medicine invited to to assist with management of hypertension. Subjective: Follow-up examination of patient at the bedside. She is alert and conversant today. He denies any current discomforts. Case management is working on SNF placement. Current Facility-Administered Medications   Medication Dose Route Frequency    dicyclomine (BENTYL) capsule 10 mg  10 mg Oral QID    oxyCODONE (ROXICODONE) 5 mg/5 mL oral solution 5 mg  5 mg Oral Q6H PRN    amino acid 4.25 % dextrose 5 % with electrolytes (CLINIMIX E) infusion   IntraVENous CONTINUOUS    losartan (COZAAR) tablet 50 mg  50 mg Oral DAILY    labetaloL (NORMODYNE;TRANDATE) 20 mg/4 mL (5 mg/mL) injection 10 mg  10 mg IntraVENous TID PRN    NIFEdipine ER (PROCARDIA XL) tablet 60 mg  60 mg Oral DAILY    famotidine (PEPCID) tablet 20 mg  20 mg Oral Q48H    polyethylene glycol (MIRALAX) packet 17 g  17 g Oral DAILY    acetaminophen (TYLENOL) suppository 650 mg  650 mg Rectal Q4H PRN    ondansetron (ZOFRAN) injection 4 mg  4 mg IntraVENous Q6H PRN        REVIEW OF SYSTEMS    Review of Systems   Constitutional: Positive for malaise/fatigue and weight loss. Gastrointestinal: Positive for abdominal pain and diarrhea. Neurological: Positive for weakness.         Objective:     Visit Vitals  BP (!) 100/59   Pulse 81   Temp 98.2 °F (36.8 °C)   Resp 18   Ht 5' 2.99\" (1.6 m)   Wt 45.5 kg (100 lb 5 oz)   SpO2 94%   BMI 17.77 kg/m²      O2 Device: Room air    Temp (24hrs), Av °F (36.7 °C), Min:97.7 °F (36.5 °C), Max:98.2 °F (36.8 °C)      No intake/output data recorded. 12/23 1901 - 12/25 0700  In: 510 [I.V.:510]  Out: -     PHYSICAL EXAM:    Physical Exam  Constitutional:       Appearance: She is ill-appearing. Cardiovascular:      Rate and Rhythm: Normal rate. Pulmonary:      Effort: Pulmonary effort is normal.   Abdominal:      Tenderness: There is abdominal tenderness. Neurological:      Mental Status: Mental status is at baseline. Motor: Weakness present. Coordination: Coordination abnormal.      Gait: Gait abnormal.          Data Review    Recent Results (from the past 24 hour(s))   GLUCOSE, POC    Collection Time: 12/24/20  2:41 PM   Result Value Ref Range    Glucose (POC) 100 65 - 100 mg/dL    Performed by Rose Marie Castillo    GLUCOSE, POC    Collection Time: 12/25/20  7:57 AM   Result Value Ref Range    Glucose (POC) 101 (H) 65 - 100 mg/dL    Performed by Alexis FUENTES        XR CHEST PORT   Final Result   IMPRESSION: No acute imaging finding. CT ABD PELV WO CONT   Final Result   IMPRESSION: Partial small bowel obstruction; persistent from CT abdomen and   pelvis 12/17/2020. XR ABD (KUB)   Final Result   IMPRESSION: Small bowel gas without distention. CT ABD PELV WO CONT   Final Result   IMPRESSION:   1. The patient's undergone insertion of a nasogastric tube. The tube traverses   well beyond the pyloric channel and the tip of the tube is located within the   proximal jejunum. 2.  There has been a decrease in the degree of distention of the small bowel. There still is a moderate residual dilatation of the small bowel within the left   abdominal region with a relative decompression of the more distal small bowel. There is an apparent transition point (series 2 image number 81). The small   bowel loops beyond this transition point to the right of midline are narrowed   and are associated with edema within their wall.  The small bowel from this transition point toward the cecum is decompressed. 3.  There is significant persistent fecal impaction within the rectal vault with   stercoral proctitis. XR ABD (KUB)   Final Result   Findings/impression: Persistent dilatation of small bowel consistent distal   small bowel obstruction. Enteric tube remains looped within the distal   esophagus. Advanced degenerative changes of the spine and left pelvic hardware   again noted. CT ABD PELV WO CONT   Final Result   IMPRESSION: High-grade mechanical small bowel obstruction, etiology   indeterminate, possibly adhesions but internal hernia is not excluded. Some   bowel contacts the right femoral vein, possibly from over i.e. panniculitis but   correlate with any clinical suspicion of femoral hernia. Gastric tube   incompletely visualized, coiled within the esophagus. Results called to [facility] on 12/13/2020 5:40 PM.         XR ABD (KUB)   Final Result      CT ABD PELV WO CONT   Final Result   Impression:   1. High-grade small bowel obstruction with a transition point in the mid   abdomen involving the distal small bowel. No evidence of perforation. Small   amount of ascites. 2.  Small bilateral pleural effusions. 3.  Large volume of stool within the rectum consistent with constipation. 4.  Multiple bilateral hyperattenuating renal lesions, likely consistent with   proteinaceous or hemorrhagic cysts. If indicated, consider follow-up CT in 6   months. 5.  Coronary artery disease and atherosclerotic disease of the aorta. XR ABD ACUTE W 1 V CHEST   Final Result   Impression:   1. No acute cardiopulmonary process. Hyperlucent lungs could indicate COPD   changes. 2.  Dilated loop of bowel in the midabdomen, possibly small bowel. Recommend CT   of the abdomen and pelvis for further evaluation. Active Problems:    Small bowel obstruction ([address] (12/11/2020)        Assessment/Plan:     1.   Small bowel obstruction: Resolving  · Likely secondary to adhesions from previous abdominal surgeries  · Repeat CT of abdomen showed there has been a decrease in the degree of distention of the small bowel, with a moderate residual dilatation of the small bowel  · She is on PPN/IL for nutrition ( managed by surgery)wean as tolerated  · Upgraded to mechanical soft diet  · Functioning returning slowly, positive flatus, positive stool  · refuses surgical intervention     2. Acute dehydration: Improving  · Secondary to poor oral intake  · IV fluids off, receiving PPN     3.  Hypertension: Stable  · Blood pressures are acceptable  · Blood pressure this morning is 130/66  · Continue Nifedipine 60 mg daily PO, added losartan 50 mg/day     4. Metabolic encephalopathy: Resolved  · Secondary to dehydration. · She is  now back to her baseline mental status  · Continue PPN nutritionwean as tolerating p.o. diet     DVT Prophylaxis: SCDs    ______________________________________________________________________________  Time spent in direct care including coordination of service, review of data and examination: > 35 minutes  Care Plan discussed with: RN and patient  ______________________________________________________________________________    Lynnetet Slaughter NP    This is dictation was done by dragon, computer voice recognition software. Quite often unanticipated grammatical, syntax, homophones and other interpretive errors or inadvertently transcribed by the computer software. Please excuse errors that have escaped final proofreading. Thank you.

## 2020-12-25 NOTE — PROGRESS NOTES
Progress Note  Date:2020       Room:205  Patient Johanna Nearing     YOB: 1927     Age:89 y.o. Subjective    Subjective:  Symptoms:  Improved. No shortness of breath, chest pain or diarrhea. Diet:  Adequate intake. No nausea or vomiting. Activity level: Impaired due to weakness. Pain:  She reports no pain. Review of Systems   Constitutional: Negative for chills and fever. HENT: Negative. Eyes: Negative. Respiratory: Negative for shortness of breath. Cardiovascular: Negative for chest pain. Gastrointestinal: Negative for abdominal distention, abdominal pain, diarrhea, nausea and vomiting. Genitourinary: Negative. Skin: Negative. Psychiatric/Behavioral: Negative. Objective         Vitals Last 24 Hours:  TEMPERATURE:  Temp  Av °F (36.7 °C)  Min: 97.7 °F (36.5 °C)  Max: 98.2 °F (36.8 °C)  RESPIRATIONS RANGE: Resp  Av  Min: 18  Max: 18  PULSE OXIMETRY RANGE: SpO2  Av.6 %  Min: 94 %  Max: 100 %  PULSE RANGE: Pulse  Av.4  Min: 75  Max: 81  BLOOD PRESSURE RANGE: Systolic (40DMU), KCE:076 , Min:100 , EYD:620   ; Diastolic (03AVO), ARS:70, Min:59, Max:86    I/O (24Hr): No intake or output data in the 24 hours ending 20 1214  Objective:  General Appearance:  Comfortable and in no acute distress. Vital signs: (most recent): Blood pressure (!) 100/59, pulse 81, temperature 98.2 °F (36.8 °C), resp. rate 18, height 5' 2.99\" (1.6 m), weight 45.5 kg (100 lb 5 oz), SpO2 94 %. No fever. (Noted hypotension x1 this morning after BP meds). Output: Producing urine and producing stool. Lungs:  Normal effort and normal respiratory rate. Breath sounds clear to auscultation. Heart: Normal rate. Regular rhythm. Abdomen: Abdomen is soft and non-distended. There is no abdominal tenderness. Pulses: Distal pulses are intact.     Neurological: Patient is alert and oriented to person, place and time. Skin:  Warm and dry. Labs/Imaging/Diagnostics    Labs:  CBC:  Recent Labs     12/24/20  0850 12/22/20  1555   WBC 4.4 7.8   RBC 3.28* 3.90   HGB 8.6* 10.4*   HCT 27.0* 32.0*   MCV 82.3 82.1   RDW 14.8* 14.7*    263     CHEMISTRIES:  Recent Labs     12/24/20  0850 12/22/20  1555   * 137   K 5.2* 4.5    105   CO2 25 28   BUN 42* 30*   CA 8.1* 8.4*   PT/INR:No results for input(s): INR, INREXT in the last 72 hours. No lab exists for component: PROTIME  APTT:No results for input(s): APTT in the last 72 hours. LIVER PROFILE:  Recent Labs     12/22/20  1555   AST 30   ALT 41     Lab Results   Component Value Date/Time    ALT (SGPT) 41 12/22/2020 03:55 PM    AST (SGOT) 30 12/22/2020 03:55 PM    Alk. phosphatase 69 12/22/2020 03:55 PM    Bilirubin, total 0.4 12/22/2020 03:55 PM       Imaging Last 24 Hours:  Xr Chest Port    Result Date: 12/24/2020  EXAM: XR CHEST PORT INDICATION: New rhonchi after eating, evaluate for evidence of aspiration COMPARISON: December 11, 2020 FINDINGS: Unremarkable cardiomediastinal contours. No focal airspace consolidation. No pneumothorax or pleural effusion. No acute fracture or dislocation. Advanced degenerative changes in bilateral shoulders. IMPRESSION: No acute imaging finding. Assessment//Plan   Active Problems:    Small bowel obstruction ([address] (12/11/2020)      Assessment:    Condition: In stable condition. Improving. (Patient doing the best this morning that I have seen her thus far. Lucid and interactive, able to hold normal conversation. Noted to have episode of hypotension x1 this morning after receiving BP meds. Tolerated breakfast without N/V. Denies abdominal pain. Cont current care, including soft diet, await Social Work placement, likely on Monday. ).        Electronically signed by Oral [place], DO on 12/25/2020 at 12:14 PM

## 2020-12-25 NOTE — PROGRESS NOTES
Progress Note      12/25/2020 8:33 AM  NAME: Gianna Jolly   MRN:  [id number]   Admit Diagnosis: [facility] ([facility] [V55.025]      Problem List:   -Hypertension  -Chest Pain  -Bowel obstruction  -Poor historian  -Pulmonary hypertension     Assessment/Plan:   -Patient is lying comfortably in the bed.  -Normal ejection fraction of 70% per echocardiogram  -Moderate to severe pulmonary hypertension with RVSP of 63 mmHg  -Based on my overall cardiac assessment she is stable and warranted no further cardiovascular testing  -We will sign off for now  ============================================================  -Is a follow-up visit from cardiology as patient was having chest pain.  -She denies any chest pain or shortness of breath or any other anginal equivalent this morning. -EKG showed no acute changes but first-degree AV block.  -Cardiac enzyme x1 is unremarkable.  -Echocardiogram is pending.  -No acute cardiac issue based on my overall cardiac assessment and we will continue current conservative medical management at this time.  -Once echo results are available we will be able to address perioperative risk  -We will continue to follow while patient is in the hospital along with the team         []       High complexity decision making was performed in this patient at high risk for decompensation with multiple organ involvement. Subjective:     Gianna Jolly denies chest pain, dyspnea and lying comfortably in the bed. This is a follow-up visit from cardiology as patient was having chest pain as of yesterday. An EKG was obtained yesterday which shows normal sinus rhythm first-degree AV block and no acute EKG changes consistent with significant ischemia or injury pattern. First set of cardiac enzyme was unremarkable. Patient denies any chest pain or shortness of breath this morning.   We will check echocardiogram and address perioperative risk assessment for noncardiac surgery if patient has to go for surgical intervention. We will continue to follow while patient is in the hospital along with the team.  Normal ejection fraction of 70% per echocardiogram.  EKG shows no acute changes and cardiac enzyme has been unremarkable. Patient has moderate to severe pulmonary hypertension with RVSP of 63 mmHg. No acute cardiac issue at this time. Will sign off for now please do not call us back if you have any question regarding Ms. Lawrence Savage  Discussed with RN events overnight. Review of Systems:    Symptom Y/N Comments  Symptom Y/N Comments   Fever/Chills N   Chest Pain N    Poor Appetite N   Edema N    Cough N   Abdominal Pain N    Sputum N   Joint Pain N    SOB/MAURO N   Pruritis/Rash N    Nausea/vomit N   Tolerating PT/OT Y    Diarrhea N   Tolerating Diet Y    Constipation N   Other       Could NOT obtain due to:      Objective:      Physical Exam:    Last 24hrs VS reviewed since prior progress note. Most recent are:    Visit Vitals  BP (!) 145/70 (BP 1 Location: Right arm, BP Patient Position: At rest)   Pulse 77   Temp 98 °F (36.7 °C)   Resp 18   Ht 5' 2.99\" (1.6 m)   Wt 45.5 kg (100 lb 5 oz)   SpO2 98%   BMI 17.77 kg/m²     No intake or output data in the 24 hours ending 12/25/20 0806     General Appearance: Well developed, well nourished, alert & oriented x 3,    no acute distress. Ears/Nose/Mouth/Throat: Hearing grossly normal.  Neck: Supple. Chest: Lungs clear to auscultation bilaterally. Cardiovascular: Regular rate and rhythm, S1S2 normal, no murmur. Abdomen: Soft, non-tender, bowel sounds are active. Extremities: No edema bilaterally. Skin: Warm and dry. []         Post-cath site without hematoma, bruit, tenderness, or thrill. Distal pulses intact. PMH/SH reviewed - no change compared to H&P    Data Review    Telemetry: normal sinus rhythm     EKG:   []  No new EKG for review  XR CHEST PORT   Final Result   IMPRESSION: No acute imaging finding.       CT ABD PELV WO CONT   Final Result IMPRESSION: Partial small bowel obstruction; persistent from CT abdomen and   pelvis 12/17/2020. XR ABD (KUB)   Final Result   IMPRESSION: Small bowel gas without distention. CT ABD PELV WO CONT   Final Result   IMPRESSION:   1. The patient's undergone insertion of a nasogastric tube. The tube traverses   well beyond the pyloric channel and the tip of the tube is located within the   proximal jejunum. 2.  There has been a decrease in the degree of distention of the small bowel. There still is a moderate residual dilatation of the small bowel within the left   abdominal region with a relative decompression of the more distal small bowel. There is an apparent transition point (series 2 image number 81). The small   bowel loops beyond this transition point to the right of midline are narrowed   and are associated with edema within their wall. The small bowel from this   transition point toward the cecum is decompressed. 3.  There is significant persistent fecal impaction within the rectal vault with   stercoral proctitis. XR ABD (KUB)   Final Result   Findings/impression: Persistent dilatation of small bowel consistent distal   small bowel obstruction. Enteric tube remains looped within the distal   esophagus. Advanced degenerative changes of the spine and left pelvic hardware   again noted. CT ABD PELV WO CONT   Final Result   IMPRESSION: High-grade mechanical small bowel obstruction, etiology   indeterminate, possibly adhesions but internal hernia is not excluded. Some   bowel contacts the right femoral vein, possibly from over i.e. panniculitis but   correlate with any clinical suspicion of femoral hernia. Gastric tube   incompletely visualized, coiled within the esophagus. Results called to Char Barnard on 12/13/2020 5:40 PM.         XR ABD (KUB)   Final Result      CT ABD PELV WO CONT   Final Result   Impression:   1.   High-grade small bowel obstruction with a transition point in the mid   abdomen involving the distal small bowel. No evidence of perforation. Small   amount of ascites. 2.  Small bilateral pleural effusions. 3.  Large volume of stool within the rectum consistent with constipation. 4.  Multiple bilateral hyperattenuating renal lesions, likely consistent with   proteinaceous or hemorrhagic cysts. If indicated, consider follow-up CT in 6   months. 5.  Coronary artery disease and atherosclerotic disease of the aorta. XR ABD ACUTE W 1 V CHEST   Final Result   Impression:   1. No acute cardiopulmonary process. Hyperlucent lungs could indicate COPD   changes. 2.  Dilated loop of bowel in the midabdomen, possibly small bowel. Recommend CT   of the abdomen and pelvis for further evaluation. Lab Data Personally Reviewed:    Recent Labs     12/24/20  0850 12/22/20  1555   WBC 4.4 7.8   HGB 8.6* 10.4*   HCT 27.0* 32.0*    263     No results for input(s): INR, PTP, APTT, INREXT, INREXT in the last 72 hours. Recent Labs     12/24/20  0850 12/22/20  1555   * 137   K 5.2* 4.5    105   CO2 25 28   BUN 42* 30*   CREA 0.81 0.69   * 106*   CA 8.1* 8.4*     Recent Labs     12/22/20  1555   TROIQ <0.05     No results found for: CHOL, CHOLX, CHLST, CHOLV, HDL, HDLP, LDL, LDLC, DLDLP, TGLX, TRIGL, TRIGP, CHHD, CHHDX    Recent Labs     12/22/20  1555   AP 69   TP 6.0*   ALB 2.4*   GLOB 3.6     No results for input(s): PH, PCO2, PO2 in the last 72 hours.     Medications Personally Reviewed:    Current Facility-Administered Medications   Medication Dose Route Frequency    dicyclomine (BENTYL) capsule 10 mg  10 mg Oral QID    oxyCODONE (ROXICODONE) 5 mg/5 mL oral solution 5 mg  5 mg Oral Q6H PRN    amino acid 4.25 % dextrose 5 % with electrolytes (CLINIMIX E) infusion   IntraVENous CONTINUOUS    losartan (COZAAR) tablet 50 mg  50 mg Oral DAILY    labetaloL (NORMODYNE;TRANDATE) 20 mg/4 mL (5 mg/mL) injection 10 mg  10 mg IntraVENous TID PRN    NIFEdipine ER (PROCARDIA XL) tablet 60 mg  60 mg Oral DAILY    famotidine (PEPCID) tablet 20 mg  20 mg Oral Q48H    polyethylene glycol (MIRALAX) packet 17 g  17 g Oral DAILY    acetaminophen (TYLENOL) suppository 650 mg  650 mg Rectal Q4H PRN    ondansetron (ZOFRAN) injection 4 mg  4 mg IntraVENous Q6H PRN         Shannan Albrecht MD

## 2020-12-26 LAB
ANION GAP SERPL CALC-SCNC: 5 MMOL/L (ref 5–15)
BASOPHILS # BLD: 0 K/UL (ref 0–0.1)
BASOPHILS NFR BLD: 1 % (ref 0–1)
BUN SERPL-MCNC: 31 MG/DL (ref 6–20)
BUN/CREAT SERPL: 40 (ref 12–20)
CA-I BLD-MCNC: 7.8 MG/DL (ref 8.5–10.1)
CHLORIDE SERPL-SCNC: 107 MMOL/L (ref 97–108)
CO2 SERPL-SCNC: 26 MMOL/L (ref 21–32)
CREAT SERPL-MCNC: 0.78 MG/DL (ref 0.55–1.02)
DIFFERENTIAL METHOD BLD: ABNORMAL
EOSINOPHIL # BLD: 0 K/UL (ref 0–0.4)
EOSINOPHIL NFR BLD: 1 % (ref 0–7)
ERYTHROCYTE [DISTWIDTH] IN BLOOD BY AUTOMATED COUNT: 14.7 % (ref 11.5–14.5)
GLUCOSE BLD STRIP.AUTO-MCNC: 101 MG/DL (ref 65–100)
GLUCOSE BLD STRIP.AUTO-MCNC: 101 MG/DL (ref 65–100)
GLUCOSE BLD STRIP.AUTO-MCNC: 125 MG/DL (ref 65–100)
GLUCOSE BLD STRIP.AUTO-MCNC: 129 MG/DL (ref 65–100)
GLUCOSE SERPL-MCNC: 78 MG/DL (ref 65–100)
HCT VFR BLD AUTO: 27.5 % (ref 35–47)
HGB BLD-MCNC: 8.8 G/DL (ref 11.5–16)
IMM GRANULOCYTES # BLD AUTO: 0 K/UL (ref 0–0.04)
IMM GRANULOCYTES NFR BLD AUTO: 1 % (ref 0–0.5)
LYMPHOCYTES # BLD: 1 K/UL (ref 0.8–3.5)
LYMPHOCYTES NFR BLD: 31 % (ref 12–49)
MCH RBC QN AUTO: 26.3 PG (ref 26–34)
MCHC RBC AUTO-ENTMCNC: 32 G/DL (ref 30–36.5)
MCV RBC AUTO: 82.3 FL (ref 80–99)
MONOCYTES # BLD: 0.5 K/UL (ref 0–1)
MONOCYTES NFR BLD: 15 % (ref 5–13)
NEUTS SEG # BLD: 1.6 K/UL (ref 1.8–8)
NEUTS SEG NFR BLD: 51 % (ref 32–75)
PERFORMED BY, TECHID: ABNORMAL
PLATELET # BLD AUTO: 327 K/UL (ref 150–400)
PMV BLD AUTO: 10.2 FL (ref 8.9–12.9)
POTASSIUM SERPL-SCNC: 4.4 MMOL/L (ref 3.5–5.1)
RBC # BLD AUTO: 3.34 M/UL (ref 3.8–5.2)
SODIUM SERPL-SCNC: 138 MMOL/L (ref 136–145)
WBC # BLD AUTO: 3.1 K/UL (ref 3.6–11)

## 2020-12-26 PROCEDURE — 65270000029 HC RM PRIVATE

## 2020-12-26 PROCEDURE — 74011250637 HC RX REV CODE- 250/637: Performed by: NURSE PRACTITIONER

## 2020-12-26 PROCEDURE — 74011250637 HC RX REV CODE- 250/637: Performed by: SURGERY

## 2020-12-26 PROCEDURE — 82962 GLUCOSE BLOOD TEST: CPT

## 2020-12-26 PROCEDURE — 80048 BASIC METABOLIC PNL TOTAL CA: CPT

## 2020-12-26 PROCEDURE — 85025 COMPLETE CBC W/AUTO DIFF WBC: CPT

## 2020-12-26 PROCEDURE — 36415 COLL VENOUS BLD VENIPUNCTURE: CPT

## 2020-12-26 PROCEDURE — 74011250637 HC RX REV CODE- 250/637: Performed by: COLON & RECTAL SURGERY

## 2020-12-26 RX ORDER — NIFEDIPINE 30 MG/1
30 TABLET, EXTENDED RELEASE ORAL DAILY
Status: DISCONTINUED | OUTPATIENT
Start: 2020-12-27 | End: 2020-12-31 | Stop reason: HOSPADM

## 2020-12-26 RX ADMIN — DICYCLOMINE HYDROCHLORIDE 10 MG: 10 CAPSULE ORAL at 09:15

## 2020-12-26 RX ADMIN — DICYCLOMINE HYDROCHLORIDE 10 MG: 10 CAPSULE ORAL at 21:13

## 2020-12-26 RX ADMIN — DICYCLOMINE HYDROCHLORIDE 10 MG: 10 CAPSULE ORAL at 13:03

## 2020-12-26 RX ADMIN — DICYCLOMINE HYDROCHLORIDE 10 MG: 10 CAPSULE ORAL at 17:30

## 2020-12-26 RX ADMIN — NIFEDIPINE 60 MG: 30 TABLET, FILM COATED, EXTENDED RELEASE ORAL at 09:14

## 2020-12-26 RX ADMIN — OXYCODONE HYDROCHLORIDE 5 MG: 5 SOLUTION ORAL at 21:13

## 2020-12-26 RX ADMIN — POLYETHYLENE GLYCOL 3350 17 G: 17 POWDER, FOR SOLUTION ORAL at 09:15

## 2020-12-26 RX ADMIN — LOSARTAN POTASSIUM 50 MG: 50 TABLET, FILM COATED ORAL at 09:15

## 2020-12-26 NOTE — PROGRESS NOTES
Problem: Pressure Injury - Risk of  Goal: *Prevention of pressure injury  Description: Document Bubba Scale and appropriate interventions in the flowsheet. Outcome: Progressing Towards Goal  Note: Pressure Injury Interventions:  Sensory Interventions: Minimize linen layers, Keep linens dry and wrinkle-free    Moisture Interventions: Absorbent underpads    Activity Interventions: PT/OT evaluation    Mobility Interventions: Turn and reposition approx. every two hours(pillow and wedges)    Nutrition Interventions: Document food/fluid/supplement intake    Friction and Shear Interventions: HOB 30 degrees or less, Minimize layers                Problem: Patient Education: Go to Patient Education Activity  Goal: Patient/Family Education  Outcome: Progressing Towards Goal     Problem: Pain  Goal: *Control of Pain  Outcome: Progressing Towards Goal  Goal: *PALLIATIVE CARE:  Alleviation of Pain  Outcome: Progressing Towards Goal     Problem: Patient Education: Go to Patient Education Activity  Goal: Patient/Family Education  Outcome: Progressing Towards Goal     Problem: Falls - Risk of  Goal: *Absence of Falls  Description: Document Seamus Fall Risk and appropriate interventions in the flowsheet. Outcome: Progressing Towards Goal  Note: Fall Risk Interventions:  Mobility Interventions: Bed/chair exit alarm    Mentation Interventions: Bed/chair exit alarm    Medication Interventions: Bed/chair exit alarm, Patient to call before getting OOB    Elimination Interventions:  Toileting schedule/hourly rounds, Call light in reach              Problem: Patient Education: Go to Patient Education Activity  Goal: Patient/Family Education  Outcome: Progressing Towards Goal     Problem: Non-Violent Restraints  Goal: *Removal from restraints as soon as assessed to be safe  Outcome: Progressing Towards Goal  Goal: *No harm/injury to patient while restraints in use  Outcome: Progressing Towards Goal  Goal: *Patient's dignity will be maintained  Outcome: Progressing Towards Goal  Goal: *Patient Specific Goal (EDIT GOAL, INSERT TEXT)  Outcome: Progressing Towards Goal  Goal: Non-violent Restaints:Standard Interventions  Outcome: Progressing Towards Goal  Goal: Non-violent Restraints:Patient Interventions  Outcome: Progressing Towards Goal  Goal: Patient/Family Education  Outcome: Progressing Towards Goal     Problem: Nutrition Deficit  Goal: *Optimize nutritional status  Outcome: Progressing Towards Goal     Problem: Nutrition Deficit  Goal: *Optimize nutritional status  Outcome: Progressing Towards Goal     Problem: Patient Education: Go to Patient Education Activity  Goal: Patient/Family Education  Outcome: Progressing Towards Goal     Problem: Patient Education: Go to Patient Education Activity  Goal: Patient/Family Education  Outcome: Progressing Towards Goal     Problem: Patient Education: Go to Patient Education Activity  Goal: Patient/Family Education  Outcome: Progressing Towards Goal

## 2020-12-26 NOTE — PROGRESS NOTES
Hospitalist Progress Note    Subjective:   Daily Progress Note: 2020 2:26 PM    Reason for consult:   Patient is a 72-year-old female with history of hypertension and previous abdominal surgeries who was admitted on 2020 due to nausea, vomiting and abdominal pain. KUB showed dilated loops of bowel in the mid abdomen possibly small bowel obstruction. She was started on NG tube to suction for decompression and kept n.p.o. Blood pressures found to be elevated so internal medicine invited to to assist with management of hypertension. Subjective: Follow-up examination of patient at the bedside. She remains in the bed, resting comfortably, easily aroused. She denies any current discomforts. Case management is working on SNF placement. Current Facility-Administered Medications   Medication Dose Route Frequency    dicyclomine (BENTYL) capsule 10 mg  10 mg Oral QID    oxyCODONE (ROXICODONE) 5 mg/5 mL oral solution 5 mg  5 mg Oral Q6H PRN    losartan (COZAAR) tablet 50 mg  50 mg Oral DAILY    labetaloL (NORMODYNE;TRANDATE) 20 mg/4 mL (5 mg/mL) injection 10 mg  10 mg IntraVENous TID PRN    NIFEdipine ER (PROCARDIA XL) tablet 60 mg  60 mg Oral DAILY    famotidine (PEPCID) tablet 20 mg  20 mg Oral Q48H    polyethylene glycol (MIRALAX) packet 17 g  17 g Oral DAILY    acetaminophen (TYLENOL) suppository 650 mg  650 mg Rectal Q4H PRN    ondansetron (ZOFRAN) injection 4 mg  4 mg IntraVENous Q6H PRN        REVIEW OF SYSTEMS    Review of Systems   Constitutional: Positive for malaise/fatigue and weight loss. Gastrointestinal: Positive for abdominal pain and diarrhea. Neurological: Positive for weakness.         Objective:     Visit Vitals  /61 (BP 1 Location: Right arm, BP Patient Position: At rest)   Pulse 88   Temp 98.1 °F (36.7 °C)   Resp 17   Ht 5' 2.99\" (1.6 m)   Wt 45.5 kg (100 lb 5 oz)   SpO2 97%   BMI 17.77 kg/m²      O2 Device: Room air    Temp (24hrs), Av.2 °F (36.8 °C), Min:98 °F (36.7 °C), Max:98.4 °F (36.9 °C)      No intake/output data recorded. 12/24 1901 - 12/26 0700  In: 350 [P.O.:350]  Out: -     PHYSICAL EXAM:    Physical Exam  Constitutional:       Appearance: She is ill-appearing. Comments: Frail    Cardiovascular:      Rate and Rhythm: Normal rate. Pulmonary:      Effort: Pulmonary effort is normal.   Abdominal:      Tenderness: There is abdominal tenderness. Neurological:      Mental Status: Mental status is at baseline. Motor: Weakness present. Coordination: Coordination abnormal.      Gait: Gait abnormal.          Data Review    Recent Results (from the past 24 hour(s))   CBC WITH AUTOMATED DIFF    Collection Time: 12/26/20  6:03 AM   Result Value Ref Range    WBC 3.1 (L) 3.6 - 11.0 K/uL    RBC 3.34 (L) 3.80 - 5.20 M/uL    HGB 8.8 (L) 11.5 - 16.0 g/dL    HCT 27.5 (L) 35.0 - 47.0 %    MCV 82.3 80.0 - 99.0 FL    MCH 26.3 26.0 - 34.0 PG    MCHC 32.0 30.0 - 36.5 g/dL    RDW 14.7 (H) 11.5 - 14.5 %    PLATELET 296 209 - 074 K/uL    MPV 10.2 8.9 - 12.9 FL    NEUTROPHILS 51 32 - 75 %    LYMPHOCYTES 31 12 - 49 %    MONOCYTES 15 (H) 5 - 13 %    EOSINOPHILS 1 0 - 7 %    BASOPHILS 1 0 - 1 %    IMMATURE GRANULOCYTES 1 (H) 0.0 - 0.5 %    ABS. NEUTROPHILS 1.6 (L) 1.8 - 8.0 K/UL    ABS. LYMPHOCYTES 1.0 0.8 - 3.5 K/UL    ABS. MONOCYTES 0.5 0.0 - 1.0 K/UL    ABS. EOSINOPHILS 0.0 0.0 - 0.4 K/UL    ABS. BASOPHILS 0.0 0.0 - 0.1 K/UL    ABS. IMM.  GRANS. 0.0 0.00 - 0.04 K/UL    DF AUTOMATED     METABOLIC PANEL, BASIC    Collection Time: 12/26/20  6:03 AM   Result Value Ref Range    Sodium 138 136 - 145 mmol/L    Potassium 4.4 3.5 - 5.1 mmol/L    Chloride 107 97 - 108 mmol/L    CO2 26 21 - 32 mmol/L    Anion gap 5 5 - 15 mmol/L    Glucose 78 65 - 100 mg/dL    BUN 31 (H) 6 - 20 mg/dL    Creatinine 0.78 0.55 - 1.02 mg/dL    BUN/Creatinine ratio 40 (H) 12 - 20      GFR est AA >60 >60 ml/min/1.73m2    GFR est non-AA >60 >60 ml/min/1.73m2    Calcium 7.8 (L) 8.5 - 10.1 mg/dL GLUCOSE, POC    Collection Time: 12/26/20  8:25 AM   Result Value Ref Range    Glucose (POC) 125 (H) 65 - 100 mg/dL    Performed by Conway Patel    GLUCOSE, POC    Collection Time: 12/26/20 11:41 AM   Result Value Ref Range    Glucose (POC) 101 (H) 65 - 100 mg/dL    Performed by Marcio Patel        XR CHEST PORT   Final Result   IMPRESSION: No acute imaging finding. CT ABD PELV WO CONT   Final Result   IMPRESSION: Partial small bowel obstruction; persistent from CT abdomen and   pelvis 12/17/2020. XR ABD (KUB)   Final Result   IMPRESSION: Small bowel gas without distention. CT ABD PELV WO CONT   Final Result   IMPRESSION:   1. The patient's undergone insertion of a nasogastric tube. The tube traverses   well beyond the pyloric channel and the tip of the tube is located within the   proximal jejunum. 2.  There has been a decrease in the degree of distention of the small bowel. There still is a moderate residual dilatation of the small bowel within the left   abdominal region with a relative decompression of the more distal small bowel. There is an apparent transition point (series 2 image number 81). The small   bowel loops beyond this transition point to the right of midline are narrowed   and are associated with edema within their wall. The small bowel from this   transition point toward the cecum is decompressed. 3.  There is significant persistent fecal impaction within the rectal vault with   stercoral proctitis. XR ABD (KUB)   Final Result   Findings/impression: Persistent dilatation of small bowel consistent distal   small bowel obstruction. Enteric tube remains looped within the distal   esophagus. Advanced degenerative changes of the spine and left pelvic hardware   again noted. CT ABD PELV WO CONT   Final Result   IMPRESSION: High-grade mechanical small bowel obstruction, etiology   indeterminate, possibly adhesions but internal hernia is not excluded.  Some bowel contacts the right femoral vein, possibly from over i.e. panniculitis but   correlate with any clinical suspicion of femoral hernia. Gastric tube   incompletely visualized, coiled within the esophagus. Results called to Char Barnard on 12/13/2020 5:40 PM.         XR ABD (KUB)   Final Result      CT ABD PELV WO CONT   Final Result   Impression:   1. High-grade small bowel obstruction with a transition point in the mid   abdomen involving the distal small bowel. No evidence of perforation. Small   amount of ascites. 2.  Small bilateral pleural effusions. 3.  Large volume of stool within the rectum consistent with constipation. 4.  Multiple bilateral hyperattenuating renal lesions, likely consistent with   proteinaceous or hemorrhagic cysts. If indicated, consider follow-up CT in 6   months. 5.  Coronary artery disease and atherosclerotic disease of the aorta. XR ABD ACUTE W 1 V CHEST   Final Result   Impression:   1. No acute cardiopulmonary process. Hyperlucent lungs could indicate COPD   changes. 2.  Dilated loop of bowel in the midabdomen, possibly small bowel. Recommend CT   of the abdomen and pelvis for further evaluation. Active Problems:    Small bowel obstruction ([address] (12/11/2020)        Assessment/Plan:     1. Small bowel obstruction: Resolving  · Likely secondary to adhesions from previous abdominal surgeries  · Repeat CT of abdomen showed there has been a decrease in the degree of distention of the small bowel, with a moderate residual dilatation of the small bowel  · She is on PPN/IL for nutrition ( managed by surgery)wean as tolerated  · Upgraded to mechanical soft diet  · Functioning returning slowly, positive flatus, positive stool  · refuses surgical intervention     2.   Acute dehydration: Improving  · Secondary to poor oral intake  · IV fluids off, receiving PPN     3.  Hypertension: Stable  · Blood pressures are acceptable  · Blood pressure this morning is 130/66  · Continue Nifedipine 60 mg daily PO, added losartan 50 mg/day     4. Metabolic encephalopathy: Resolved  · Secondary to dehydration. · She is  now back to her baseline mental status  · Continue PPN nutritionwean as tolerating p.o. diet     DVT Prophylaxis: SCDs    ______________________________________________________________________________  Time spent in direct care including coordination of service, review of data and examination: > 35 minutes  Care Plan discussed with: RN and patient  ______________________________________________________________________________    Theron Yeh NP    This is dictation was done by dragon, computer voice recognition software. Quite often unanticipated grammatical, syntax, homophones and other interpretive errors or inadvertently transcribed by the computer software. Please excuse errors that have escaped final proofreading. Thank you.

## 2020-12-26 NOTE — PROGRESS NOTES
Progress Note  Date:2020       Room:  Patient Anayeli Huggins     YOB: 1927     Age:89 y.o. Subjective    Subjective:  Symptoms:  Stable. She reports diarrhea (Loose stool x1 today). No shortness of breath, cough or chest pain. Diet:  Poor intake. No nausea or vomiting. Activity level: Impaired due to weakness. Pain:  She reports no pain. Review of Systems   Constitutional: Positive for fatigue. Negative for chills and fever. HENT: Negative. Eyes: Negative. Respiratory: Negative for cough and shortness of breath. Cardiovascular: Negative for chest pain. Gastrointestinal: Positive for diarrhea (Loose stool x1 today). Negative for abdominal distention, abdominal pain, blood in stool, nausea and vomiting. Genitourinary: Negative. Skin: Negative. Psychiatric/Behavioral: Positive for confusion. Objective         Vitals Last 24 Hours:  TEMPERATURE:  Temp  Av.2 °F (36.8 °C)  Min: 98 °F (36.7 °C)  Max: 98.4 °F (36.9 °C)  RESPIRATIONS RANGE: Resp  Av  Min: 16  Max: 18  PULSE OXIMETRY RANGE: SpO2  Av.3 %  Min: 96 %  Max: 99 %  PULSE RANGE: Pulse  Av.8  Min: 76  Max: 99  BLOOD PRESSURE RANGE: Systolic (76MAW), SXX:358 , Min:100 , BGQ:016   ; Diastolic (46DWF), VYP:34, Min:59, Max:69    I/O (24Hr): Intake/Output Summary (Last 24 hours) at 2020 1823  Last data filed at 2020 1557  Gross per 24 hour   Intake 1004 ml   Output    Net 1004 ml     Objective:  General Appearance:  Comfortable and in no acute distress. Vital signs: (most recent): Blood pressure (!) 100/59, pulse 99, temperature 98 °F (36.7 °C), resp. rate 16, height 5' 2.99\" (1.6 m), weight 45.5 kg (100 lb 5 oz), SpO2 96 %. No fever. (Noted hypotension x1 this morning after BP meds). Output: Producing urine and producing stool. Lungs:  Normal effort and normal respiratory rate.   Breath sounds clear to auscultation. Heart: Normal rate. Regular rhythm. Abdomen: Abdomen is soft and non-distended. There is no abdominal tenderness. Pulses: Distal pulses are intact. Neurological: Patient is alert and oriented to person, place and time. Skin:  Warm and dry. Labs/Imaging/Diagnostics    Labs:  CBC:  Recent Labs     12/26/20  0603 12/24/20  0850   WBC 3.1* 4.4   RBC 3.34* 3.28*   HGB 8.8* 8.6*   HCT 27.5* 27.0*   MCV 82.3 82.3   RDW 14.7* 14.8*    262     CHEMISTRIES:  Recent Labs     12/26/20  0603 12/24/20  0850    135*   K 4.4 5.2*    105   CO2 26 25   BUN 31* 42*   CA 7.8* 8.1*   PT/INR:No results for input(s): INR, INREXT, INREXT in the last 72 hours. No lab exists for component: PROTIME  APTT:No results for input(s): APTT in the last 72 hours. LIVER PROFILE:  No results for input(s): AST, ALT in the last 72 hours. No lab exists for component: ANALI Finn  Lab Results   Component Value Date/Time    ALT (SGPT) 41 12/22/2020 03:55 PM    AST (SGOT) 30 12/22/2020 03:55 PM    Alk. phosphatase 69 12/22/2020 03:55 PM    Bilirubin, total 0.4 12/22/2020 03:55 PM       Imaging Last 24 Hours:  No results found. Assessment//Plan   Active Problems:    Small bowel obstruction ([address] (12/11/2020)      Assessment:    Condition: In stable condition. Unchanged. (Patient asleep when I first came to the room, resting comfortably. No abdominal pain. Nursing reports loose (not liquid) stool x1 today. Poor PO intake. HR gradually increasing over past 24hrs from 70s to 90s. WBC downtrending, now slightly neutropenic. Monitor closely. Cont current care, including soft diet, await Social Work placement, likely on Monday. ).        Electronically signed by Clementina Mccoy DO on 12/26/2020 at 12:14 PM

## 2020-12-27 LAB
GLUCOSE BLD STRIP.AUTO-MCNC: 100 MG/DL (ref 65–100)
GLUCOSE BLD STRIP.AUTO-MCNC: 126 MG/DL (ref 65–100)
GLUCOSE BLD STRIP.AUTO-MCNC: 143 MG/DL (ref 65–100)
GLUCOSE BLD STRIP.AUTO-MCNC: 78 MG/DL (ref 65–100)
PERFORMED BY, TECHID: ABNORMAL
PERFORMED BY, TECHID: ABNORMAL
PERFORMED BY, TECHID: NORMAL
PERFORMED BY, TECHID: NORMAL

## 2020-12-27 PROCEDURE — 65270000029 HC RM PRIVATE

## 2020-12-27 PROCEDURE — 74011250637 HC RX REV CODE- 250/637: Performed by: COLON & RECTAL SURGERY

## 2020-12-27 PROCEDURE — 82962 GLUCOSE BLOOD TEST: CPT

## 2020-12-27 PROCEDURE — 74011250637 HC RX REV CODE- 250/637: Performed by: NURSE PRACTITIONER

## 2020-12-27 PROCEDURE — 74011250637 HC RX REV CODE- 250/637: Performed by: SURGERY

## 2020-12-27 RX ADMIN — NIFEDIPINE 30 MG: 30 TABLET, FILM COATED, EXTENDED RELEASE ORAL at 09:08

## 2020-12-27 RX ADMIN — DICYCLOMINE HYDROCHLORIDE 10 MG: 10 CAPSULE ORAL at 09:08

## 2020-12-27 RX ADMIN — POLYETHYLENE GLYCOL 3350 17 G: 17 POWDER, FOR SOLUTION ORAL at 09:08

## 2020-12-27 RX ADMIN — LOSARTAN POTASSIUM 50 MG: 50 TABLET, FILM COATED ORAL at 09:08

## 2020-12-27 RX ADMIN — DICYCLOMINE HYDROCHLORIDE 10 MG: 10 CAPSULE ORAL at 17:25

## 2020-12-27 RX ADMIN — OXYCODONE HYDROCHLORIDE 5 MG: 5 SOLUTION ORAL at 18:13

## 2020-12-27 RX ADMIN — FAMOTIDINE 20 MG: 20 TABLET ORAL at 09:08

## 2020-12-27 RX ADMIN — OXYCODONE HYDROCHLORIDE 5 MG: 5 SOLUTION ORAL at 03:36

## 2020-12-27 RX ADMIN — DICYCLOMINE HYDROCHLORIDE 10 MG: 10 CAPSULE ORAL at 23:07

## 2020-12-27 RX ADMIN — DICYCLOMINE HYDROCHLORIDE 10 MG: 10 CAPSULE ORAL at 12:53

## 2020-12-27 NOTE — PROGRESS NOTES
Problem: Pressure Injury - Risk of  Goal: *Prevention of pressure injury  Description: Document Bubba Scale and appropriate interventions in the flowsheet. Outcome: Progressing Towards Goal  Note: Pressure Injury Interventions:  Sensory Interventions: Minimize linen layers, Keep linens dry and wrinkle-free    Moisture Interventions: Absorbent underpads    Activity Interventions: PT/OT evaluation    Mobility Interventions: Turn and reposition approx. every two hours(pillow and wedges)    Nutrition Interventions: Document food/fluid/supplement intake    Friction and Shear Interventions: HOB 30 degrees or less                Problem: Patient Education: Go to Patient Education Activity  Goal: Patient/Family Education  Outcome: Progressing Towards Goal     Problem: Pain  Goal: *Control of Pain  Outcome: Progressing Towards Goal  Goal: *PALLIATIVE CARE:  Alleviation of Pain  Outcome: Progressing Towards Goal     Problem: Patient Education: Go to Patient Education Activity  Goal: Patient/Family Education  Outcome: Progressing Towards Goal     Problem: Falls - Risk of  Goal: *Absence of Falls  Description: Document Seamus Fall Risk and appropriate interventions in the flowsheet.   Outcome: Progressing Towards Goal  Note: Fall Risk Interventions:  Mobility Interventions: Bed/chair exit alarm    Mentation Interventions: Bed/chair exit alarm    Medication Interventions: Bed/chair exit alarm    Elimination Interventions: Call light in reach, Toileting schedule/hourly rounds              Problem: Patient Education: Go to Patient Education Activity  Goal: Patient/Family Education  Outcome: Progressing Towards Goal     Problem: Non-Violent Restraints  Goal: *Removal from restraints as soon as assessed to be safe  Outcome: Progressing Towards Goal  Goal: *No harm/injury to patient while restraints in use  Outcome: Progressing Towards Goal  Goal: *Patient's dignity will be maintained  Outcome: Progressing Towards Goal  Goal: *Patient Specific Goal (EDIT GOAL, INSERT TEXT)  Outcome: Progressing Towards Goal  Goal: Non-violent Restaints:Standard Interventions  Outcome: Progressing Towards Goal  Goal: Non-violent Restraints:Patient Interventions  Outcome: Progressing Towards Goal  Goal: Patient/Family Education  Outcome: Progressing Towards Goal     Problem: Nutrition Deficit  Goal: *Optimize nutritional status  Outcome: Progressing Towards Goal     Problem: Nutrition Deficit  Goal: *Optimize nutritional status  Outcome: Progressing Towards Goal     Problem: Patient Education: Go to Patient Education Activity  Goal: Patient/Family Education  Outcome: Progressing Towards Goal     Problem: Patient Education: Go to Patient Education Activity  Goal: Patient/Family Education  Outcome: Progressing Towards Goal     Problem: Patient Education: Go to Patient Education Activity  Goal: Patient/Family Education  Outcome: Progressing Towards Goal

## 2020-12-27 NOTE — PROGRESS NOTES
Progress Note  Date:2020       Room:  Patient Nixon Dhaliwal     YOB: 1927     Age:89 y.o. Subjective    Subjective:  Symptoms:  Stable. No shortness of breath, cough, chest pain or diarrhea. Diet:  Poor intake. No nausea or vomiting. Activity level: Impaired due to weakness. Pain:  She reports no pain. (However, nursing reports patient has intermittently complained of abdominal pain. ). Review of Systems   Constitutional: Positive for appetite change (Decreased appetite, now only taking liquids) and fatigue. Negative for chills and fever. HENT: Negative. Eyes: Negative. Respiratory: Negative for cough and shortness of breath. Cardiovascular: Negative for chest pain. Gastrointestinal: Negative for abdominal distention, abdominal pain, blood in stool, diarrhea, nausea and vomiting. Genitourinary: Negative. Skin: Negative. Objective         Vitals Last 24 Hours:  TEMPERATURE:  Temp  Av.1 °F (36.7 °C)  Min: 97.8 °F (36.6 °C)  Max: 98.4 °F (36.9 °C)  RESPIRATIONS RANGE: Resp  Av.4  Min: 16  Max: 17  PULSE OXIMETRY RANGE: SpO2  Av.2 %  Min: 95 %  Max: 99 %  PULSE RANGE: Pulse  Av.6  Min: 71  Max: 99  BLOOD PRESSURE RANGE: Systolic (82SZW), JPE:623 , Min:100 , PUN:929   ; Diastolic (77EWM), IGL:85, Min:54, Max:80    I/O (24Hr): Intake/Output Summary (Last 24 hours) at 2020 1159  Last data filed at 2020 0229  Gross per 24 hour   Intake 387 ml   Output    Net 387 ml     Objective:  General Appearance:  Comfortable and in no acute distress. Vital signs: (most recent): Blood pressure (!) 100/54, pulse 85, temperature 98 °F (36.7 °C), resp. rate 17, height 5' 2.99\" (1.6 m), weight 45.5 kg (100 lb 5 oz), SpO2 98 %. No fever. (Noted hypotension x1 this morning after BP meds). Output: Producing urine and producing stool. Lungs:  Normal effort and normal respiratory rate. Breath sounds clear to auscultation. Heart: Normal rate. Regular rhythm. Abdomen: Abdomen is soft and non-distended. There is no abdominal tenderness. Pulses: Distal pulses are intact. Neurological: Patient is alert and oriented to person, place and time. Skin:  Warm and dry. Labs/Imaging/Diagnostics    Labs:  CBC:  Recent Labs     12/26/20  0603   WBC 3.1*   RBC 3.34*   HGB 8.8*   HCT 27.5*   MCV 82.3   RDW 14.7*        CHEMISTRIES:  Recent Labs     12/26/20  0603      K 4.4      CO2 26   BUN 31*   CA 7.8*   PT/INR:No results for input(s): INR, INREXT, INREXT in the last 72 hours. No lab exists for component: PROTIME  APTT:No results for input(s): APTT in the last 72 hours. LIVER PROFILE:  No results for input(s): AST, ALT in the last 72 hours. No lab exists for component: DELFINA VasquezPHOS  Lab Results   Component Value Date/Time    ALT (SGPT) 41 12/22/2020 03:55 PM    AST (SGOT) 30 12/22/2020 03:55 PM    Alk. phosphatase 69 12/22/2020 03:55 PM    Bilirubin, total 0.4 12/22/2020 03:55 PM       Imaging Last 24 Hours:  No results found. Assessment//Plan   Active Problems:    Small bowel obstruction ([facility] [address] (12/11/2020)      Assessment:    Condition: In stable condition. Unchanged. (Patient asleep when I first came to the room, resting comfortably. No abdominal pain. Nursing reports no BM since yesterday. Poor PO intake, now only taking liquids. HR stable. No new labs today. Cont current care, including soft diet, await Social Work placement, likely on Monday. Continue serial exams. Dr. Michelle Hayes will be assuming surgical care of patient at 1900 today. ).        Electronically signed by Althea Larios DO on 12/27/2020 at 12:14 PM

## 2020-12-27 NOTE — PROGRESS NOTES
Hospitalist Progress Note    Subjective:   Daily Progress Note: 12/27/2020 2:26 PM    Reason for consult:   Patient is a 77-year-old female with history of hypertension and previous abdominal surgeries who was admitted on 12/11/2020 due to nausea, vomiting and abdominal pain. KUB showed dilated loops of bowel in the mid abdomen possibly small bowel obstruction. She was started on NG tube to suction for decompression and kept n.p.o. Blood pressures found to be elevated so internal medicine invited to to assist with management of hypertension. Subjective: Follow-up examination of patient at the bedside. She remains in the bed, resting comfortably, easily aroused. She denies any current discomforts. Appetite is poor, performance is poor. Eating only liquids at this time. Case management is working on SNF placement. Current Facility-Administered Medications   Medication Dose Route Frequency    NIFEdipine ER (PROCARDIA XL) tablet 30 mg  30 mg Oral DAILY    dicyclomine (BENTYL) capsule 10 mg  10 mg Oral QID    oxyCODONE (ROXICODONE) 5 mg/5 mL oral solution 5 mg  5 mg Oral Q6H PRN    losartan (COZAAR) tablet 50 mg  50 mg Oral DAILY    labetaloL (NORMODYNE;TRANDATE) 20 mg/4 mL (5 mg/mL) injection 10 mg  10 mg IntraVENous TID PRN    famotidine (PEPCID) tablet 20 mg  20 mg Oral Q48H    polyethylene glycol (MIRALAX) packet 17 g  17 g Oral DAILY    acetaminophen (TYLENOL) suppository 650 mg  650 mg Rectal Q4H PRN    ondansetron (ZOFRAN) injection 4 mg  4 mg IntraVENous Q6H PRN        REVIEW OF SYSTEMS    Review of Systems   Constitutional: Positive for malaise/fatigue and weight loss. Gastrointestinal: Positive for abdominal pain and diarrhea. Neurological: Positive for weakness.         Objective:     Visit Vitals  /66 (BP 1 Location: Right arm, BP Patient Position: At rest)   Pulse 91   Temp 97.7 °F (36.5 °C)   Resp 18   Ht 5' 2.99\" (1.6 m)   Wt 45.5 kg (100 lb 5 oz)   SpO2 98%   BMI 17.77 kg/m²      O2 Device: Room air    Temp (24hrs), Av °F (36.7 °C), Min:97.7 °F (36.5 °C), Max:98.4 °F (36.9 °C)      701 - 1900  In: 548 [P.O.:474]  Out: -   1901 -  07  In: 954 [P.O.:954]  Out: -     PHYSICAL EXAM:    Physical Exam  Constitutional:       Appearance: She is ill-appearing. Comments: Frail    Cardiovascular:      Rate and Rhythm: Normal rate. Pulmonary:      Effort: Pulmonary effort is normal.   Abdominal:      Tenderness: There is abdominal tenderness. Neurological:      Mental Status: Mental status is at baseline. Motor: Weakness present. Coordination: Coordination abnormal.      Gait: Gait abnormal.          Data Review    Recent Results (from the past 24 hour(s))   GLUCOSE, POC    Collection Time: 20  8:29 PM   Result Value Ref Range    Glucose (POC) 101 (H) 65 - 100 mg/dL    Performed by [address] [place] [address] Drive, POC    Collection Time: 20  8:03 AM   Result Value Ref Range    Glucose (POC) 78 65 - 100 mg/dL    Performed by [place] Officer    GLUCOSE, POC    Collection Time: 20 11:48 AM   Result Value Ref Range    Glucose (POC) 100 65 - 100 mg/dL    Performed by [place] Officer    GLUCOSE, POC    Collection Time: 20  3:29 PM   Result Value Ref Range    Glucose (POC) 143 (H) 65 - 100 mg/dL    Performed by Salome Villa        XR CHEST PORT   Final Result   IMPRESSION: No acute imaging finding. CT ABD PELV WO CONT   Final Result   IMPRESSION: Partial small bowel obstruction; persistent from CT abdomen and   pelvis 2020. XR ABD (KUB)   Final Result   IMPRESSION: Small bowel gas without distention. CT ABD PELV WO CONT   Final Result   IMPRESSION:   1. The patient's undergone insertion of a nasogastric tube. The tube traverses   well beyond the pyloric channel and the tip of the tube is located within the   proximal jejunum. 2.  There has been a decrease in the degree of distention of the small bowel.    There still is a moderate residual dilatation of the small bowel within the left   abdominal region with a relative decompression of the more distal small bowel. There is an apparent transition point (series 2 image number 81). The small   bowel loops beyond this transition point to the right of midline are narrowed   and are associated with edema within their wall. The small bowel from this   transition point toward the cecum is decompressed. 3.  There is significant persistent fecal impaction within the rectal vault with   stercoral proctitis. XR ABD (KUB)   Final Result   Findings/impression: Persistent dilatation of small bowel consistent distal   small bowel obstruction. Enteric tube remains looped within the distal   esophagus. Advanced degenerative changes of the spine and left pelvic hardware   again noted. CT ABD PELV WO CONT   Final Result   IMPRESSION: High-grade mechanical small bowel obstruction, etiology   indeterminate, possibly adhesions but internal hernia is not excluded. Some   bowel contacts the right femoral vein, possibly from over i.e. panniculitis but   correlate with any clinical suspicion of femoral hernia. Gastric tube   incompletely visualized, coiled within the esophagus. Results called to [place] [id number] on 12/13/2020 5:40 PM.         XR ABD (KUB)   Final Result      CT ABD PELV WO CONT   Final Result   Impression:   1. High-grade small bowel obstruction with a transition point in the mid   abdomen involving the distal small bowel. No evidence of perforation. Small   amount of ascites. 2.  Small bilateral pleural effusions. 3.  Large volume of stool within the rectum consistent with constipation. 4.  Multiple bilateral hyperattenuating renal lesions, likely consistent with   proteinaceous or hemorrhagic cysts. If indicated, consider follow-up CT in 6   months. 5.  Coronary artery disease and atherosclerotic disease of the aorta.       XR ABD ACUTE W 1 V CHEST   Final Result   Impression:   1. No acute cardiopulmonary process. Hyperlucent lungs could indicate COPD   changes. 2.  Dilated loop of bowel in the midabdomen, possibly small bowel. Recommend CT   of the abdomen and pelvis for further evaluation. Active Problems:    Small bowel obstruction ([address] (12/11/2020)        Assessment/Plan:     1. Small bowel obstruction: Resolving  · Likely secondary to adhesions from previous abdominal surgeries  · Repeat CT of abdomen showed there has been a decrease in the degree of distention of the small bowel, with a moderate residual dilatation of the small bowel  · Weaned off PPN  · Upgraded to mechanical soft diet but only tolerating liquids at this point  · Functioning returning slowly, positive flatus, positive stool  · refuses surgical intervention  · Poor performance, failure to thrive     2. Acute dehydration: Improving  · Secondary to poor oral intake     3. Hypertension: Stable  · Blood pressures are acceptable  · Blood pressure this morning is 130/66  · Continue Nifedipine 60 mg daily PO, added losartan 50 mg/day     4. Metabolic encephalopathy: Resolved  · Secondary to dehydration. · She is  now back to her baseline mental status  · Continue PPN nutritionwean as tolerating p.o. diet     DVT Prophylaxis: SCDs    ______________________________________________________________________________  Time spent in direct care including coordination of service, review of data and examination: > 35 minutes  Care Plan discussed with: RN and patient  ______________________________________________________________________________    Papa Whitaker NP    This is dictation was done by dragon, computer voice recognition software. Quite often unanticipated grammatical, syntax, homophones and other interpretive errors or inadvertently transcribed by the computer software. Please excuse errors that have escaped final proofreading. Thank you.

## 2020-12-28 LAB
ANION GAP SERPL CALC-SCNC: 3 MMOL/L (ref 5–15)
BASOPHILS # BLD: 0.1 K/UL (ref 0–0.1)
BASOPHILS NFR BLD: 4 % (ref 0–1)
BUN SERPL-MCNC: 33 MG/DL (ref 6–20)
BUN/CREAT SERPL: 41 (ref 12–20)
CA-I BLD-MCNC: 8.1 MG/DL (ref 8.5–10.1)
CHLORIDE SERPL-SCNC: 108 MMOL/L (ref 97–108)
CO2 SERPL-SCNC: 29 MMOL/L (ref 21–32)
CREAT SERPL-MCNC: 0.8 MG/DL (ref 0.55–1.02)
DIFFERENTIAL METHOD BLD: ABNORMAL
EOSINOPHIL # BLD: 0 K/UL (ref 0–0.4)
EOSINOPHIL NFR BLD: 1 % (ref 0–7)
ERYTHROCYTE [DISTWIDTH] IN BLOOD BY AUTOMATED COUNT: 14.3 % (ref 11.5–14.5)
GLUCOSE BLD STRIP.AUTO-MCNC: 127 MG/DL (ref 65–100)
GLUCOSE BLD STRIP.AUTO-MCNC: 131 MG/DL (ref 65–100)
GLUCOSE BLD STRIP.AUTO-MCNC: 135 MG/DL (ref 65–100)
GLUCOSE SERPL-MCNC: 89 MG/DL (ref 65–100)
HCT VFR BLD AUTO: 27.6 % (ref 35–47)
HGB BLD-MCNC: 8.8 G/DL (ref 11.5–16)
IMM GRANULOCYTES # BLD AUTO: 0 K/UL (ref 0–0.04)
IMM GRANULOCYTES NFR BLD AUTO: 0 % (ref 0–0.5)
LYMPHOCYTES # BLD: 1 K/UL (ref 0.8–3.5)
LYMPHOCYTES NFR BLD: 45 % (ref 12–49)
MCH RBC QN AUTO: 27.2 PG (ref 26–34)
MCHC RBC AUTO-ENTMCNC: 31.9 G/DL (ref 30–36.5)
MCV RBC AUTO: 85.4 FL (ref 80–99)
MONOCYTES # BLD: 0.3 K/UL (ref 0–1)
MONOCYTES NFR BLD: 13 % (ref 5–13)
NEUTS SEG # BLD: 0.9 K/UL (ref 1.8–8)
NEUTS SEG NFR BLD: 37 % (ref 32–75)
PERFORMED BY, TECHID: ABNORMAL
PLATELET # BLD AUTO: 314 K/UL (ref 150–400)
PMV BLD AUTO: 10.5 FL (ref 8.9–12.9)
POTASSIUM SERPL-SCNC: 4.4 MMOL/L (ref 3.5–5.1)
RBC # BLD AUTO: 3.23 M/UL (ref 3.8–5.2)
SODIUM SERPL-SCNC: 140 MMOL/L (ref 136–145)
WBC # BLD AUTO: 2.3 K/UL (ref 3.6–11)

## 2020-12-28 PROCEDURE — 99232 SBSQ HOSP IP/OBS MODERATE 35: CPT | Performed by: COLON & RECTAL SURGERY

## 2020-12-28 PROCEDURE — 74011250637 HC RX REV CODE- 250/637: Performed by: NURSE PRACTITIONER

## 2020-12-28 PROCEDURE — 82962 GLUCOSE BLOOD TEST: CPT

## 2020-12-28 PROCEDURE — 74011250637 HC RX REV CODE- 250/637: Performed by: COLON & RECTAL SURGERY

## 2020-12-28 PROCEDURE — 80048 BASIC METABOLIC PNL TOTAL CA: CPT

## 2020-12-28 PROCEDURE — 85025 COMPLETE CBC W/AUTO DIFF WBC: CPT

## 2020-12-28 PROCEDURE — 97530 THERAPEUTIC ACTIVITIES: CPT

## 2020-12-28 PROCEDURE — 74011250637 HC RX REV CODE- 250/637: Performed by: SURGERY

## 2020-12-28 PROCEDURE — 36415 COLL VENOUS BLD VENIPUNCTURE: CPT

## 2020-12-28 PROCEDURE — 65270000029 HC RM PRIVATE

## 2020-12-28 RX ADMIN — DICYCLOMINE HYDROCHLORIDE 10 MG: 10 CAPSULE ORAL at 22:45

## 2020-12-28 RX ADMIN — OXYCODONE HYDROCHLORIDE 5 MG: 5 SOLUTION ORAL at 13:27

## 2020-12-28 RX ADMIN — DICYCLOMINE HYDROCHLORIDE 10 MG: 10 CAPSULE ORAL at 09:52

## 2020-12-28 RX ADMIN — DICYCLOMINE HYDROCHLORIDE 10 MG: 10 CAPSULE ORAL at 13:09

## 2020-12-28 RX ADMIN — LOSARTAN POTASSIUM 50 MG: 50 TABLET, FILM COATED ORAL at 09:52

## 2020-12-28 RX ADMIN — DICYCLOMINE HYDROCHLORIDE 10 MG: 10 CAPSULE ORAL at 18:00

## 2020-12-28 RX ADMIN — NIFEDIPINE 30 MG: 30 TABLET, FILM COATED, EXTENDED RELEASE ORAL at 09:52

## 2020-12-28 RX ADMIN — POLYETHYLENE GLYCOL 3350 17 G: 17 POWDER, FOR SOLUTION ORAL at 09:52

## 2020-12-28 NOTE — PROGRESS NOTES
CM notes patient is pending review with [facility]. CM will continue to follow for any additional discharge planning needs.     12:26pm     Patient is now pending Auth with [facility]

## 2020-12-28 NOTE — PROGRESS NOTES
Problem: Pressure Injury - Risk of  Goal: *Prevention of pressure injury  Description: Document Bubba Scale and appropriate interventions in the flowsheet. Outcome: Progressing Towards Goal  Note: Pressure Injury Interventions:  Sensory Interventions: Keep linens dry and wrinkle-free, Minimize linen layers, Turn and reposition approx. every two hours (pillows and wedges if needed)    Moisture Interventions: Absorbent underpads, Minimize layers    Activity Interventions: PT/OT evaluation    Mobility Interventions: PT/OT evaluation, Turn and reposition approx.  every two hours(pillow and wedges)    Nutrition Interventions: Document food/fluid/supplement intake, Offer support with meals,snacks and hydration    Friction and Shear Interventions: HOB 30 degrees or less, Foam dressings/transparent film/skin sealants, Minimize layers                Problem: Patient Education: Go to Patient Education Activity  Goal: Patient/Family Education  Outcome: Progressing Towards Goal

## 2020-12-28 NOTE — PROGRESS NOTES
Hospitalist Progress Note    Subjective:   Daily Progress Note: 12/28/2020 2:26 PM    Reason for consult:   Patient is a 80-year-old female with history of hypertension and previous abdominal surgeries who was admitted on 12/11/2020 due to nausea, vomiting and abdominal pain. KUB showed dilated loops of bowel in the mid abdomen possibly small bowel obstruction. She was started on NG tube to suction for decompression and kept n.p.o. Blood pressures found to be elevated so internal medicine invited to to assist with management of hypertension. Subjective: Follow-up examination of patient at the bedside. She remains in the bed, resting comfortably, easily aroused. She denies any current discomforts. Appetite is poor, performance is poor. Eating only liquids at this time. Case management is working on SNF placement. Labs are stable, blood pressures are stable. Current Facility-Administered Medications   Medication Dose Route Frequency    NIFEdipine ER (PROCARDIA XL) tablet 30 mg  30 mg Oral DAILY    dicyclomine (BENTYL) capsule 10 mg  10 mg Oral QID    oxyCODONE (ROXICODONE) 5 mg/5 mL oral solution 5 mg  5 mg Oral Q6H PRN    losartan (COZAAR) tablet 50 mg  50 mg Oral DAILY    labetaloL (NORMODYNE;TRANDATE) 20 mg/4 mL (5 mg/mL) injection 10 mg  10 mg IntraVENous TID PRN    famotidine (PEPCID) tablet 20 mg  20 mg Oral Q48H    polyethylene glycol (MIRALAX) packet 17 g  17 g Oral DAILY    acetaminophen (TYLENOL) suppository 650 mg  650 mg Rectal Q4H PRN    ondansetron (ZOFRAN) injection 4 mg  4 mg IntraVENous Q6H PRN        REVIEW OF SYSTEMS    Review of Systems   Constitutional: Positive for malaise/fatigue and weight loss. Gastrointestinal: Positive for abdominal pain and diarrhea. Neurological: Positive for weakness.         Objective:     Visit Vitals  /65 (BP 1 Location: Right arm, BP Patient Position: At rest)   Pulse 80   Temp 98 °F (36.7 °C)   Resp 16   Ht 5' 2.99\" (1.6 m)   Wt 45.5 kg (100 lb 5 oz)   SpO2 98%   BMI 17.77 kg/m²      O2 Device: Room air    Temp (24hrs), Av.9 °F (36.6 °C), Min:97.7 °F (36.5 °C), Max:98.1 °F (36.7 °C)      No intake/output data recorded.  1901 -  0700  In: 36 [P.O.:978]  Out: -     PHYSICAL EXAM:    Physical Exam  Constitutional:       Appearance: She is ill-appearing. Comments: Frail    Cardiovascular:      Rate and Rhythm: Normal rate. Pulmonary:      Effort: Pulmonary effort is normal.   Abdominal:      Tenderness: There is abdominal tenderness. Neurological:      Mental Status: Mental status is at baseline. Motor: Weakness present. Coordination: Coordination abnormal.      Gait: Gait abnormal.          Data Review    Recent Results (from the past 24 hour(s))   GLUCOSE, POC    Collection Time: 20  3:29 PM   Result Value Ref Range    Glucose (POC) 143 (H) 65 - 100 mg/dL    Performed by [address] [place] [address], POC    Collection Time: 20  8:57 PM   Result Value Ref Range    Glucose (POC) 126 (H) 65 - 100 mg/dL    Performed by [address] [place] [address], BASIC    Collection Time: 20  6:00 AM   Result Value Ref Range    Sodium 140 136 - 145 mmol/L    Potassium 4.4 3.5 - 5.1 mmol/L    Chloride 108 97 - 108 mmol/L    CO2 29 21 - 32 mmol/L    Anion gap 3 (L) 5 - 15 mmol/L    Glucose 89 65 - 100 mg/dL    BUN 33 (H) 6 - 20 mg/dL    Creatinine 0.80 0.55 - 1.02 mg/dL    BUN/Creatinine ratio 41 (H) 12 - 20      GFR est AA >60 >60 ml/min/1.73m2    GFR est non-AA >60 >60 ml/min/1.73m2    Calcium 8.1 (L) 8.5 - 10.1 mg/dL   GLUCOSE, POC    Collection Time: 20  7:59 AM   Result Value Ref Range    Glucose (POC) 127 (H) 65 - 100 mg/dL    Performed by Shamika Muniz    GLUCOSE, POC    Collection Time: 20 11:25 AM   Result Value Ref Range    Glucose (POC) 131 (H) 65 - 100 mg/dL    Performed by Shamika Muniz        XR CHEST PORT   Final Result   IMPRESSION: No acute imaging finding.       CT ABD PELV WO CONT Final Result   IMPRESSION: Partial small bowel obstruction; persistent from CT abdomen and   pelvis 12/17/2020. XR ABD (KUB)   Final Result   IMPRESSION: Small bowel gas without distention. CT ABD PELV WO CONT   Final Result   IMPRESSION:   1. The patient's undergone insertion of a nasogastric tube. The tube traverses   well beyond the pyloric channel and the tip of the tube is located within the   proximal jejunum. 2.  There has been a decrease in the degree of distention of the small bowel. There still is a moderate residual dilatation of the small bowel within the left   abdominal region with a relative decompression of the more distal small bowel. There is an apparent transition point (series 2 image number 81). The small   bowel loops beyond this transition point to the right of midline are narrowed   and are associated with edema within their wall. The small bowel from this   transition point toward the cecum is decompressed. 3.  There is significant persistent fecal impaction within the rectal vault with   stercoral proctitis. XR ABD (KUB)   Final Result   Findings/impression: Persistent dilatation of small bowel consistent distal   small bowel obstruction. Enteric tube remains looped within the distal   esophagus. Advanced degenerative changes of the spine and left pelvic hardware   again noted. CT ABD PELV WO CONT   Final Result   IMPRESSION: High-grade mechanical small bowel obstruction, etiology   indeterminate, possibly adhesions but internal hernia is not excluded. Some   bowel contacts the right femoral vein, possibly from over i.e. panniculitis but   correlate with any clinical suspicion of femoral hernia. Gastric tube   incompletely visualized, coiled within the esophagus. Results called to Char Barnard on 12/13/2020 5:40 PM.         XR ABD (KUB)   Final Result      CT ABD PELV WO CONT   Final Result   Impression:   1.   High-grade small bowel obstruction with a transition point in the mid   abdomen involving the distal small bowel. No evidence of perforation. Small   amount of ascites. 2.  Small bilateral pleural effusions. 3.  Large volume of stool within the rectum consistent with constipation. 4.  Multiple bilateral hyperattenuating renal lesions, likely consistent with   proteinaceous or hemorrhagic cysts. If indicated, consider follow-up CT in 6   months. 5.  Coronary artery disease and atherosclerotic disease of the aorta. XR ABD ACUTE W 1 V CHEST   Final Result   Impression:   1. No acute cardiopulmonary process. Hyperlucent lungs could indicate COPD   changes. 2.  Dilated loop of bowel in the midabdomen, possibly small bowel. Recommend CT   of the abdomen and pelvis for further evaluation. Active Problems:    Small bowel obstruction ([address] (12/11/2020)        Assessment/Plan:     1. Small bowel obstruction: Resolving  · Likely secondary to adhesions from previous abdominal surgeries  · Repeat CT of abdomen showed there has been a decrease in the degree of distention of the small bowel, with a moderate residual dilatation of the small bowel  · Weaned off PPN  · Upgraded to mechanical soft diet but only tolerating liquids at this point  · Functioning returning slowly, positive flatus, positive stool  · refuses surgical intervention  · Poor performance, failure to thrive     2. Acute dehydration: Improving  · Secondary to poor oral intake     3. Hypertension: Stable  · Blood pressures are acceptable  · Blood pressure this morning is 130/66  · Continue Nifedipine 60 mg daily PO, added losartan 50 mg/day     4. Metabolic encephalopathy: Resolved  · Secondary to dehydration.   · She is  now back to her baseline mental status  · Continue PPN nutritionwean as tolerating p.o. diet     DVT Prophylaxis: SCDs    ______________________________________________________________________________  Time spent in direct care including coordination of service, review of data and examination: > 35 minutes  Care Plan discussed with: RN and patient and general surgery. Patient is stable ready for discharge to SNF when accepted. Blood pressures are stable, labs are stable, will sign off and less any further needs arise.  ______________________________________________________________________________    Nidia Morton NP    This is dictation was done by dragon, computer voice recognition software. Quite often unanticipated grammatical, syntax, homophones and other interpretive errors or inadvertently transcribed by the computer software. Please excuse errors that have escaped final proofreading. Thank you.

## 2020-12-28 NOTE — PROGRESS NOTES
Problem: Pressure Injury - Risk of  Goal: *Prevention of pressure injury  Description: Document Bubba Scale and appropriate interventions in the flowsheet. Outcome: Progressing Towards Goal  Note: Pressure Injury Interventions:  Sensory Interventions: Minimize linen layers, Keep linens dry and wrinkle-free    Moisture Interventions: Absorbent underpads    Activity Interventions: PT/OT evaluation    Mobility Interventions: HOB 30 degrees or less    Nutrition Interventions: Document food/fluid/supplement intake    Friction and Shear Interventions: HOB 30 degrees or less                Problem: Patient Education: Go to Patient Education Activity  Goal: Patient/Family Education  Outcome: Progressing Towards Goal     Problem: Pain  Goal: *Control of Pain  Outcome: Progressing Towards Goal  Goal: *PALLIATIVE CARE:  Alleviation of Pain  Outcome: Progressing Towards Goal     Problem: Patient Education: Go to Patient Education Activity  Goal: Patient/Family Education  Outcome: Progressing Towards Goal     Problem: Falls - Risk of  Goal: *Absence of Falls  Description: Document Seamus Fall Risk and appropriate interventions in the flowsheet.   Outcome: Progressing Towards Goal  Note: Fall Risk Interventions:  Mobility Interventions: Bed/chair exit alarm    Mentation Interventions: Door open when patient unattended, Room close to nurse's station, More frequent rounding, Toileting rounds    Medication Interventions: Bed/chair exit alarm    Elimination Interventions: Call light in reach, Toileting schedule/hourly rounds              Problem: Patient Education: Go to Patient Education Activity  Goal: Patient/Family Education  Outcome: Progressing Towards Goal     Problem: Non-Violent Restraints  Goal: *Removal from restraints as soon as assessed to be safe  Outcome: Progressing Towards Goal  Goal: *No harm/injury to patient while restraints in use  Outcome: Progressing Towards Goal  Goal: *Patient's dignity will be maintained  Outcome: Progressing Towards Goal  Goal: *Patient Specific Goal (EDIT GOAL, INSERT TEXT)  Outcome: Progressing Towards Goal  Goal: Non-violent Restaints:Standard Interventions  Outcome: Progressing Towards Goal  Goal: Non-violent Restraints:Patient Interventions  Outcome: Progressing Towards Goal  Goal: Patient/Family Education  Outcome: Progressing Towards Goal     Problem: Nutrition Deficit  Goal: *Optimize nutritional status  Outcome: Progressing Towards Goal     Problem: Nutrition Deficit  Goal: *Optimize nutritional status  Outcome: Progressing Towards Goal     Problem: Patient Education: Go to Patient Education Activity  Goal: Patient/Family Education  Outcome: Progressing Towards Goal     Problem: Patient Education: Go to Patient Education Activity  Goal: Patient/Family Education  Outcome: Progressing Towards Goal     Problem: Patient Education: Go to Patient Education Activity  Goal: Patient/Family Education  Outcome: Progressing Towards Goal

## 2020-12-28 NOTE — PROGRESS NOTES
Progress Note    Patient: [place] Side MRN: [id number]  SSN: [id number]    YOB: 1927  Age: 80 y.o. Sex: female      Admit Date: 12/11/2020    LOS: 17 days     Subjective:   Patient seen in bed  Tolerating liquid diet  Having bowel movements  Objective:     Vitals:    12/27/20 1516 12/27/20 1905 12/28/20 0125 12/28/20 0755   BP: 116/66 (!) 140/70 (!) 153/79 (!) 130/48   Pulse: 91 79 77 85   Resp: 18 16 17 16   Temp: 97.7 °F (36.5 °C) 98.1 °F (36.7 °C) 97.9 °F (36.6 °C) 97.7 °F (36.5 °C)   SpO2: 98% 99% 96% 97%   Weight:       Height:            Intake and Output:  Current Shift: No intake/output data recorded.   Last three shifts: 12/26 1901 - 12/28 0700  In: 978 [P.O.:978]  Out: -     Review of Systems:  ROS     Physical Exam:   Abdomen is soft, nondistended, mildly tender palpation in the mid abdomen    Lab/Data Review:  Recent Results (from the past 24 hour(s))   GLUCOSE, POC    Collection Time: 12/27/20 11:48 AM   Result Value Ref Range    Glucose (POC) 100 65 - 100 mg/dL    Performed by [address], POC    Collection Time: 12/27/20  3:29 PM   Result Value Ref Range    Glucose (POC) 143 (H) 65 - 100 mg/dL    Performed by [address] [place] [address], POC    Collection Time: 12/27/20  8:57 PM   Result Value Ref Range    Glucose (POC) 126 (H) 65 - 100 mg/dL    Performed by SUSHANT NUNEZ    METABOLIC PANEL, BASIC    Collection Time: 12/28/20  6:00 AM   Result Value Ref Range    Sodium 140 136 - 145 mmol/L    Potassium 4.4 3.5 - 5.1 mmol/L    Chloride 108 97 - 108 mmol/L    CO2 29 21 - 32 mmol/L    Anion gap 3 (L) 5 - 15 mmol/L    Glucose 89 65 - 100 mg/dL    BUN 33 (H) 6 - 20 mg/dL    Creatinine 0.80 0.55 - 1.02 mg/dL    BUN/Creatinine ratio 41 (H) 12 - 20      GFR est AA >60 >60 ml/min/1.73m2    GFR est non-AA >60 >60 ml/min/1.73m2    Calcium 8.1 (L) 8.5 - 10.1 mg/dL   GLUCOSE, POC    Collection Time: 12/28/20  7:59 AM   Result Value Ref Range    Glucose (POC) 127 (H) 65 - 100 mg/dL    Performed by Rm Carmona         Assessment:     Active Problems:    Small bowel obstruction ([address] (12/11/2020)        Plan:   Overall doing well, will plan on discharge to skilled nursing facility on a liquid diet with Ensure supplementation. At this point I do not think the patient will do well with any surgical intervention and she has been tolerating oral intake with bowel movements.     Signed By: Magalis Pelayo MD     December 28, 2020

## 2020-12-28 NOTE — PROGRESS NOTES
Problem: Mobility Impaired (Adult and Pediatric)  Goal: *Acute Goals and Plan of Care (Insert Text)  Description: Patient will move from supine to sit and sit to supine , scoot up and down, and roll side to side in bed with moderate assistance  within 7 day(s). Patient will transfer from bed to chair and chair to bed with moderate assistance  using the least restrictive device within 7 day(s). Patient will improve static standing balance to moderate assistance within 1 week(s). Patient will ambulate 10 feet with moderate assistance with least restrictive device within 1 weeks. Outcome: Progressing Towards Goal   PHYSICAL THERAPY TREATMENT  Patient: Soha Parker (25 y.o. female)  Date: 12/28/2020  Diagnosis: Small bowel obstruction ([facility] [A70.691] <principal problem not specified>       Precautions: fall,dementia   Chart, physical therapy assessment, plan of care and goals were reviewed. ASSESSMENT  Patient continues with skilled PT services and is progressing towards goals. Patient more alert today and able to participate in therapy. Supine to sit with mod assist. Patient able to stand with mod to max assist and unable to take any side steps. Required extended time for the task. patient mod to max assist for sit tp supine and scooting up in bed. Current Level of Function Impacting Discharge (mobility/balance): decreased gen strength and endurance to activities. Requires more assist  Other factors to consider for discharge: SNF       PLAN :  Patient continues to benefit from skilled intervention to address the above impairments. Continue treatment per established plan of care. to address goals.     Recommendation for discharge: (in order for the patient to meet his/her long term goals)  Therapy up to 5 days/week in SNF setting    This discharge recommendation:  Has been made in collaboration with the attending provider and/or case management    IF patient discharges home will need the following DME: wheelchair       SUBJECTIVE:   Patient stated patient found in bed, calm and cooperative    OBJECTIVE DATA SUMMARY:   Critical Behavior:  Neurologic State: Alert, Confused  Orientation Level: Oriented to person  Cognition: Follows commands     Functional Mobility Training:  Bed Mobility:  Rolling: Moderate assistance  Supine to Sit: Moderate assistance  Sit to Supine: Moderate assistance  Scooting: Moderate assistance        Transfers:  Sit to Stand: Moderate assistance;Maximum assistance  Stand to Sit: Moderate assistance;Maximum assistance                             Balance:  Sitting: Impaired; With support  Sitting - Static: Fair (occasional)  Sitting - Dynamic: Fair (occasional)  Standing: Impaired;Pull to stand; With support  Standing - Static: Constant support; Fair  Standing - Dynamic : Poor;Constant support    Therapeutic Exercises:   Passive ROM  Pain Ratin/10    Activity Tolerance:   Fair  Please refer to the flowsheet for vital signs taken during this treatment. After treatment patient left in no apparent distress:   Supine in bed and Call bell within reach    COMMUNICATION/COLLABORATION:   The patients plan of care was discussed with: Registered nurse.      Vimal Hawthorne PT   Time Calculation: 23 mins

## 2020-12-29 LAB
GLUCOSE BLD STRIP.AUTO-MCNC: 106 MG/DL (ref 65–100)
GLUCOSE BLD STRIP.AUTO-MCNC: 97 MG/DL (ref 65–100)
PERFORMED BY, TECHID: ABNORMAL
PERFORMED BY, TECHID: NORMAL

## 2020-12-29 PROCEDURE — 74011250637 HC RX REV CODE- 250/637: Performed by: SURGERY

## 2020-12-29 PROCEDURE — 74011250637 HC RX REV CODE- 250/637: Performed by: NURSE PRACTITIONER

## 2020-12-29 PROCEDURE — 92526 ORAL FUNCTION THERAPY: CPT

## 2020-12-29 PROCEDURE — 82962 GLUCOSE BLOOD TEST: CPT

## 2020-12-29 PROCEDURE — 97530 THERAPEUTIC ACTIVITIES: CPT

## 2020-12-29 PROCEDURE — 74011250637 HC RX REV CODE- 250/637: Performed by: COLON & RECTAL SURGERY

## 2020-12-29 PROCEDURE — 65270000029 HC RM PRIVATE

## 2020-12-29 PROCEDURE — 99232 SBSQ HOSP IP/OBS MODERATE 35: CPT | Performed by: COLON & RECTAL SURGERY

## 2020-12-29 RX ADMIN — DICYCLOMINE HYDROCHLORIDE 10 MG: 10 CAPSULE ORAL at 22:39

## 2020-12-29 RX ADMIN — LOSARTAN POTASSIUM 50 MG: 50 TABLET, FILM COATED ORAL at 09:09

## 2020-12-29 RX ADMIN — OXYCODONE HYDROCHLORIDE 5 MG: 5 SOLUTION ORAL at 06:37

## 2020-12-29 RX ADMIN — POLYETHYLENE GLYCOL 3350 17 G: 17 POWDER, FOR SOLUTION ORAL at 09:08

## 2020-12-29 RX ADMIN — NIFEDIPINE 30 MG: 30 TABLET, FILM COATED, EXTENDED RELEASE ORAL at 09:09

## 2020-12-29 RX ADMIN — DICYCLOMINE HYDROCHLORIDE 10 MG: 10 CAPSULE ORAL at 18:04

## 2020-12-29 RX ADMIN — DICYCLOMINE HYDROCHLORIDE 10 MG: 10 CAPSULE ORAL at 13:16

## 2020-12-29 RX ADMIN — FAMOTIDINE 20 MG: 20 TABLET ORAL at 09:09

## 2020-12-29 RX ADMIN — DICYCLOMINE HYDROCHLORIDE 10 MG: 10 CAPSULE ORAL at 09:09

## 2020-12-29 NOTE — PROGRESS NOTES
Problem: Pressure Injury - Risk of  Goal: *Prevention of pressure injury  Description: Document Bubba Scale and appropriate interventions in the flowsheet. Outcome: Progressing Towards Goal  Note: Pressure Injury Interventions:  Sensory Interventions: Keep linens dry and wrinkle-free, Discuss PT/OT consult with provider, Minimize linen layers, Turn and reposition approx. every two hours (pillows and wedges if needed)    Moisture Interventions: Absorbent underpads, Minimize layers, Check for incontinence Q2 hours and as needed    Activity Interventions: PT/OT evaluation    Mobility Interventions: HOB 30 degrees or less, PT/OT evaluation, Turn and reposition approx.  every two hours(pillow and wedges)    Nutrition Interventions: Document food/fluid/supplement intake, Offer support with meals,snacks and hydration    Friction and Shear Interventions: Apply protective barrier, creams and emollients, Foam dressings/transparent film/skin sealants, HOB 30 degrees or less, Minimize layers                Problem: Patient Education: Go to Patient Education Activity  Goal: Patient/Family Education  Outcome: Progressing Towards Goal

## 2020-12-29 NOTE — PROGRESS NOTES
Progress Note    Patient: Bernadette Batista MRN: [id number]  SSN: [id number]    YOB: 1927  Age: 80 y.o. Sex: female      Admit Date: 12/11/2020    LOS: 18 days     Subjective:   61-year-old female admitted on December 11, 2020 for partial small bowel obstruction  Patient seen in bed  Continues to report abdominal pain in the mid abdomen  Denies nausea or vomiting, tolerating liquids although only 100 mL recorded yesterday    Objective:     Vitals:    12/28/20 1122 12/28/20 1517 12/28/20 2352 12/29/20 0335   BP: 128/65 129/63 (!) 141/85 (!) 150/78   Pulse: 80 79 71 74   Resp: 16 16 18 18   Temp: 98 °F (36.7 °C) 98.3 °F (36.8 °C) 98.3 °F (36.8 °C) 98.9 °F (37.2 °C)   SpO2: 98% 98% 97% 97%   Weight:       Height:            Intake and Output:  Current Shift: No intake/output data recorded. Last three shifts: 12/27 1901 - 12/29 0700  In: 454 [P.O.:454]  Out: -     Review of Systems:  ROS     Physical Exam:   Abdomen is soft, nondistended, tender palpation in the mid abdomen and right lower quadrant with no rebound or guarding    Lab/Data Review:  Recent Results (from the past 24 hour(s))   GLUCOSE, POC    Collection Time: 12/28/20  7:59 AM   Result Value Ref Range    Glucose (POC) 127 (H) 65 - 100 mg/dL    Performed by  [address], POC    Collection Time: 12/28/20 11:25 AM   Result Value Ref Range    Glucose (POC) 131 (H) 65 - 100 mg/dL    Performed by [address] [place] [address] Road, POC    Collection Time: 12/28/20  3:24 PM   Result Value Ref Range    Glucose (POC) 135 (H) 65 - 100 mg/dL    Performed by Rupinder Bianca           Assessment:     Active Problems:    Small bowel obstruction ([address] (12/11/2020)        Plan:   Overall Ms. Mary Oneill is improved since admission however still continues to have abdominal pain. I had extensive discussion with her son regarding surgical intervention and told her that her risk of perioperative complications are very high given her age and comorbidities.   At this point will continue with your current liquid diet. I did tell him that if she shows evidence of recurrent obstruction that she will need surgery.     Signed By: Timm Olszewski, MD     December 29, 2020

## 2020-12-29 NOTE — PROGRESS NOTES
Comprehensive Nutrition Assessment    Type and Reason for Visit: Reassess(Goal)    Nutrition Recommendations/Plan:     Continue NDD1/NTL diet      Adjust per SLP recs    Continue Ensure Enlive BID - thicken to NTL consistency  D/c Ensure pdg  Add Magic cup BID     Nursing to document %meal and supplement intakes in I/Os      Nutrition Assessment:  Admitted for abd pain, n/v. COVID negative. Dx SBO, confirmed on previous scans. NGT dc'd 12/18, having BMs. F/u KUB (12/21) showed small bowel gas without distention. Per MD notes, pt is back to baseline mentation and more alert. Continues with abd pain, however clinically improved and no current signs of obstruction per Surg. Pt declined surgical intervention, requested DNR status and d/c planning in progress. Previously NPO/CLx10. Ordered PPN since NPO day 5. MD Surg appreciative of RD recs, PPN at goal (12/15-12/23) then weaned appropriately. Advanced to Full Liquids 12/22, however pt with poor appetite per chart review. While on Full Liq, intakes avg ~50%. Recorded in EMR as follows: (12/21) 50%; (12/22) 75, 0, 25%; (12/23)  80, 30, 90%. Advanced to Ground/HTL diet 12/24 per MD. Intake of 75% documented at lunch yesterday. SLP consulted per RD recs, noted pt tolerating Ground texture however significant delay with intakes, downgraded today to Pureed texture, liquids upgraded to NTL. RN relayed pt dislikes Ensure pdg, will try Magic cup. RD visualized bites of breakfast with 100% Ensure consumed, will continue. Discussed ensuring supplements thickened with nsg staff. Labs: H/H: 8.8/27.6, BUN 33, , Ca 8.1. Meds: bentyl, oxycodone, pepcid, miralax, zofran.      Malnutrition Assessment:  Malnutrition Status:  Mild malnutrition(RD suspects higher degree of malnutrition)    Context:  Chronic illness     Findings of the 6 clinical characteristics of malnutrition:   Energy Intake:  Mild decrease in energy intake (specify)  Weight Loss:  Unable to assess     Body Fat Loss:  No significant body fat loss,     Muscle Mass Loss:  7 - Severe muscle mass loss, Calf (gastrocnemius), Temples (temporalis)  Fluid Accumulation:  No significant fluid accumulation,      Estimated Daily Nutrient Needs:  Energy (kcal): 1275kcal (28kcal/kg); Weight Used for Energy Requirements: Current  Protein (g): 55g (1.2g/kg); Weight Used for Protein Requirements: Current  Fluid (ml/day): 1275mL; Method Used for Fluid Requirements: 1 ml/kcal      Nutrition Related Findings:  NFPE finding mild and severe muscle wasting. NGT dc'd. Unclear if pt with dysphagia hx. Ordered dysphagia diet on admit, SLP following. No n/v or c/d. Continues to have BMs. Last one documented 12/27. No edema. Wounds:    None       Current Nutrition Therapies:  DIET NUTRITIONAL SUPPLEMENTS Breakfast, Lunch; Ensure Enlive (thicken to HTL)  DIET NUTRITIONAL SUPPLEMENTS Lunch, Dinner; Ensure Pudding (vanilla)  DIET DYSPHAGIA PUREED (NDD1)  2 Loyal/2 Mildly Thick    Anthropometric Measures:  · Height:  5' 2.99\" (160 cm)  · Current Body Wt:  45.5 kg (100 lb 5 oz)(12/20)   · Admission Body Wt:  110 lb 3.7 oz(Est)    · Usual Body Wt:  (ANNIE)     · Ideal Body Wt:  115 lbs:  87.2 %   · BMI Category:  Underweight (BMI less than 18.5)     Wt hx: 45.5kg (12/20), 45.1kg (12/18), 50kg (12/15-admit).     Nutrition Diagnosis:   · Biting/chewing (masticatory) difficulty related to (prolonged mastication) as evidenced by (SLP rec'd NDD1/NTL diet)    · Inadequate protein-energy intake related to (decreased appetite, inconsistent intakes) as evidenced by intake 26-50%      Nutrition Interventions:   Food and/or Nutrient Delivery: Continue current diet, Modify oral nutrition supplement  Nutrition Education and Counseling: No recommendations at this time  Coordination of Nutrition Care: Continue to monitor while inpatient    Goals:  Meet >65% EENs via PO x 5-7 days      Meet >75% EENs via PO vs PN (met, d/c)  Tolerating oral diet in 7 days (met, d/c)  Lytes WNL (met)  Maintain bowel fx   (met)    Nutrition Monitoring and Evaluation:   Behavioral-Environmental Outcomes: None identified  Food/Nutrient Intake Outcomes: Food and nutrient intake, Supplement intake  Physical Signs/Symptoms Outcomes: Chewing or swallowing, Biochemical data, Weight, GI status    Discharge Planning:    Continue oral nutrition supplement     Electronically signed by Laura Archuleta on 12/29/2020 at 4:47 PM    Contact: [phone]

## 2020-12-29 NOTE — PROGRESS NOTES
SPEECH LANGUAGE PATHOLOGY DYSPHAGIA TREATMENT  Patient: Felipe Bocanegra (35 y.o. female)  Date: 12/29/2020  Diagnosis: Small bowel obstruction ([address] [T98.175] <principal problem not specified>       Precautions: Aspiration/GERD      ASSESSMENT:  Patient seen for follow up. Nsg present in room, administering medication crushed in HTL. Nsg reports patient tolerating HTL without difficulty; however, patient requires significant amount of time and effort with mechanical soft. Lunch tray present at bedside, not yet consumed. Administered trials of NTL, HTL, puree, and mech soft from lunch tray. Oral phase c/b reduced bolus acceptance, prolonged munchy chew pattern with ground, slow a-p transit. Delay in initiation of swallow noted; once initiated, HLE appears adequate to digital palpation. Cough response noted with HTL H20; patient reports due to dislike of taste and texture. No clinical indicators noted with NTL, puree, or ground consistencies. Hiccup x2 noted. Patient reports feeling \"full\" after po trials. Continued concerns for esophageal dysfunction and subsequent aspiration risk. PLAN:  Recommendations and Planned Interventions:  Rec downgrade diet to puree and upgrade liquids to NECTAR-thick. STRICT aspiration precautions. Patient may benefit from smaller, more frequent meals vs 3 large meals. 1:1 feeding assistance with all po, alternate solids/liquids, small sips, small bites, slow rate of intake, maintain HOB elevation 90 degrees for at least 60 minutes after meals. Add slick textures to diet. Avoid acidic foods/drinks, greasy foods, dry foods/breads. Patient continues to benefit from skilled intervention to address the above impairments. Continue treatment per established plan of care. Frequency/Duration: Patient will be followed by speech-language pathology 4 times a week to address goals. Discharge Recommendations:   To Be Determined     SUBJECTIVE:   Patient reports reduced appetite but no difficulty tolerating current diet. OBJECTIVE:     CXR Results  (Last 48 hours)      None          CT Results  (Last 48 hours)      None           Cognitive and Communication Status:  Neurologic State: Alert, Drowsy  Orientation Level: Oriented to person  Cognition: Follows commands, Memory loss           After treatment:   Patient left in no apparent distress in bed, Call bell within reach, and Nursing notified    COMMUNICATION/EDUCATION:   Patient was educated regarding her deficit(s) of dysphagia, swallow safety precautions, diet recs and POC. She demonstrated Fair understanding as evidenced by engagement. The patient's plan of care including recommendations, planned interventions, and recommended diet changes were discussed with: Registered nurse. Venu Merida M.S. CCC-SLP                 Problem: Dysphagia (Adult)  Goal: *Acute Goals and Plan of Care (Insert Text)  Description: Speech Therapy Goals  Initiated 12/24/2020  -Patient will tolerate d2 diet with nectar thick liquids without signs/symptoms of aspiration given minimal cues within 7 day(s). [ ] Not met  [ ]  MET   [ ] Progressing  [ ] Hank   -Patient will tolerate trials of diet upgrade with SLP only without overt s/s aspiration within 7 days. [ ] Not met  [ ]  MET   [ ] Progressing  [ ] Hank   -Patient will demonstrate understanding of swallow safety precautions and aspiration precautions, diet recs with minimal cues within 7 day(s).         [ ] Not met  [ ]  MET   [ ] Progressing  [ ] Discontinue          Outcome: Progressing Towards Goal

## 2020-12-29 NOTE — PROGRESS NOTES
Progress Note      12/29/2020 8:33 AM  NAME: Nathaly Romero   MRN:  [id number]   Admit Diagnosis: Small bowel obstruction ([facility] [C98.715]      Problem List:   -Hypertension  -Chest Pain  -Bowel obstruction  -Poor historian  -Pulmonary hypertension     Assessment/Plan:   -Patient is lying comfortably in the bed.  -Follow up interval visit from cardiology as patient is a still here.  -Patient is somewhat confused but able to tell her name otherwise disoriented.  -Normal ejection fraction of 70% per echocardiogram  -Moderate to severe pulmonary hypertension with RVSP of 63 mmHg  -Based on my overall cardiac assessment she is stable and warranted no further cardiovascular testing. We will follow while she is in the hospital along with the team on as needed basis with conservative approach unless patient clinical status changes.    ============================================================  -Is a follow-up visit from cardiology as patient was having chest pain.  -She denies any chest pain or shortness of breath or any other anginal equivalent this morning. -EKG showed no acute changes but first-degree AV block.  -Cardiac enzyme x1 is unremarkable.  -Echocardiogram is pending.  -No acute cardiac issue based on my overall cardiac assessment and we will continue current conservative medical management at this time.  -Once echo results are available we will be able to address perioperative risk  -We will continue to follow while patient is in the hospital along with the team         []       High complexity decision making was performed in this patient at high risk for decompensation with multiple organ involvement. Subjective:     Nathaly Romero denies chest pain, dyspnea and lying comfortably in the bed. This is a follow-up visit from cardiology as patient was having chest pain as of yesterday.   An EKG was obtained yesterday which shows normal sinus rhythm first-degree AV block and no acute EKG changes consistent with significant ischemia or injury pattern. First set of cardiac enzyme was unremarkable. Patient denies any chest pain or shortness of breath this morning. We will check echocardiogram and address perioperative risk assessment for noncardiac surgery if patient has to go for surgical intervention. We will continue to follow while patient is in the hospital along with the team.  Normal ejection fraction of 70% per echocardiogram.  EKG shows no acute changes and cardiac enzyme has been unremarkable. Patient has moderate to severe pulmonary hypertension with RVSP of 63 mmHg. No acute cardiac issue at this time. Will sign off for now please do not call us back if you have any question regarding Ms. Lawrence Savage  Discussed with RN events overnight. Review of Systems:    Symptom Y/N Comments  Symptom Y/N Comments   Fever/Chills N   Chest Pain N    Poor Appetite N   Edema N    Cough N   Abdominal Pain N    Sputum N   Joint Pain N    SOB/MAURO N   Pruritis/Rash N    Nausea/vomit N   Tolerating PT/OT Y    Diarrhea N   Tolerating Diet Y    Constipation N   Other       Could NOT obtain due to:      Objective:      Physical Exam:    Last 24hrs VS reviewed since prior progress note. Most recent are:    Visit Vitals  BP (!) 147/74 (BP 1 Location: Left arm, BP Patient Position: At rest)   Pulse 73   Temp 97.7 °F (36.5 °C)   Resp 18   Ht 5' 2.99\" (1.6 m)   Wt 45.5 kg (100 lb 5 oz)   SpO2 99%   BMI 17.77 kg/m²       Intake/Output Summary (Last 24 hours) at 12/29/2020 1121  Last data filed at 12/28/2020 1800  Gross per 24 hour   Intake 100 ml   Output    Net 100 ml        General Appearance: Well developed, well nourished, alert & oriented x 3,    no acute distress. Ears/Nose/Mouth/Throat: Hearing grossly normal.  Neck: Supple. Chest: Lungs clear to auscultation bilaterally. Cardiovascular: Regular rate and rhythm, S1S2 normal, no murmur. Abdomen: Soft, non-tender, bowel sounds are active.   Extremities: No edema bilaterally. Skin: Warm and dry. []         Post-cath site without hematoma, bruit, tenderness, or thrill. Distal pulses intact. PMH/SH reviewed - no change compared to H&P    Data Review    Telemetry: normal sinus rhythm     EKG:   []  No new EKG for review  XR CHEST PORT   Final Result   IMPRESSION: No acute imaging finding. CT ABD PELV WO CONT   Final Result   IMPRESSION: Partial small bowel obstruction; persistent from CT abdomen and   pelvis 12/17/2020. XR ABD (KUB)   Final Result   IMPRESSION: Small bowel gas without distention. CT ABD PELV WO CONT   Final Result   IMPRESSION:   1. The patient's undergone insertion of a nasogastric tube. The tube traverses   well beyond the pyloric channel and the tip of the tube is located within the   proximal jejunum. 2.  There has been a decrease in the degree of distention of the small bowel. There still is a moderate residual dilatation of the small bowel within the left   abdominal region with a relative decompression of the more distal small bowel. There is an apparent transition point (series 2 image number 81). The small   bowel loops beyond this transition point to the right of midline are narrowed   and are associated with edema within their wall. The small bowel from this   transition point toward the cecum is decompressed. 3.  There is significant persistent fecal impaction within the rectal vault with   stercoral proctitis. XR ABD (KUB)   Final Result   Findings/impression: Persistent dilatation of small bowel consistent distal   small bowel obstruction. Enteric tube remains looped within the distal   esophagus. Advanced degenerative changes of the spine and left pelvic hardware   again noted. CT ABD PELV WO CONT   Final Result   IMPRESSION: High-grade mechanical small bowel obstruction, etiology   indeterminate, possibly adhesions but internal hernia is not excluded.  Some   bowel contacts the right femoral vein, possibly from over i.e. panniculitis but   correlate with any clinical suspicion of femoral hernia. Gastric tube   incompletely visualized, coiled within the esophagus. Results called to [facility] on 12/13/2020 5:40 PM.         XR ABD (KUB)   Final Result      CT ABD PELV WO CONT   Final Result   Impression:   1. High-grade small bowel obstruction with a transition point in the mid   abdomen involving the distal small bowel. No evidence of perforation. Small   amount of ascites. 2.  Small bilateral pleural effusions. 3.  Large volume of stool within the rectum consistent with constipation. 4.  Multiple bilateral hyperattenuating renal lesions, likely consistent with   proteinaceous or hemorrhagic cysts. If indicated, consider follow-up CT in 6   months. 5.  Coronary artery disease and atherosclerotic disease of the aorta. XR ABD ACUTE W 1 V CHEST   Final Result   Impression:   1. No acute cardiopulmonary process. Hyperlucent lungs could indicate COPD   changes. 2.  Dilated loop of bowel in the midabdomen, possibly small bowel. Recommend CT   of the abdomen and pelvis for further evaluation. Lab Data Personally Reviewed:    Recent Labs     12/28/20  0600   WBC 2.3*   HGB 8.8*   HCT 27.6*        No results for input(s): INR, PTP, APTT, INREXT, INREXT in the last 72 hours. Recent Labs     12/28/20  0600      K 4.4      CO2 29   BUN 33*   CREA 0.80   GLU 89   CA 8.1*     No results for input(s): CPK, CKNDX, TROIQ in the last 72 hours. No lab exists for component: CPKMB  No results found for: CHOL, CHOLX, CHLST, CHOLV, HDL, HDLP, LDL, LDLC, DLDLP, TGLX, TRIGL, TRIGP, CHHD, CHHDX    No results for input(s): AP, TBIL, TP, ALB, GLOB, GGT, AML, LPSE in the last 72 hours. No lab exists for component: SGOT, GPT, AMYP, HLPSE  No results for input(s): PH, PCO2, PO2 in the last 72 hours.     Medications Personally Reviewed:    Current Facility-Administered Medications   Medication Dose Route Frequency    NIFEdipine ER (PROCARDIA XL) tablet 30 mg  30 mg Oral DAILY    dicyclomine (BENTYL) capsule 10 mg  10 mg Oral QID    oxyCODONE (ROXICODONE) 5 mg/5 mL oral solution 5 mg  5 mg Oral Q6H PRN    losartan (COZAAR) tablet 50 mg  50 mg Oral DAILY    labetaloL (NORMODYNE;TRANDATE) 20 mg/4 mL (5 mg/mL) injection 10 mg  10 mg IntraVENous TID PRN    famotidine (PEPCID) tablet 20 mg  20 mg Oral Q48H    polyethylene glycol (MIRALAX) packet 17 g  17 g Oral DAILY    acetaminophen (TYLENOL) suppository 650 mg  650 mg Rectal Q4H PRN    ondansetron (ZOFRAN) injection 4 mg  4 mg IntraVENous Q6H PRN         Marcos Sloan MD

## 2020-12-29 NOTE — PROGRESS NOTES
Problem: Self Care Deficits Care Plan (Adult)  Goal: *Acute Goals and Plan of Care (Insert Text)  Description: Pt will be SBA sup <> sit in prep for EOB ADLs  Pt will be SBA grooming sitting EOB  Pt will be min A LE dressing sitting EOB/long sit  Pt will be min A sit <>  prep for toileting LRAD  Pt will be min A toileting/toilet transfer/cloth mgmt LRAD  Pt will be min A following UE HEP in prep for self care tasks  Outcome: Progressing Towards Goal     OCCUPATIONAL THERAPY RE-EVALUATION  Patient: Christy Reed (4 y.o. female)  Date: 12/29/2020  Diagnosis: [facility] bowel [facility] ([facility] [V09.812] <principal problem not specified>       Precautions:  falls  Chart, occupational therapy assessment, plan of care, and goals were reviewed. ASSESSMENT  Pt is a 81 y/o female came to [facility] and adm 12/11/2020 with nausea, vomitting, abdominal pain, HTN, chest pain, partial small bowel obstruction (on liquid diet now, had NG tube removed on 12/18/2020). Per chart review, pt lives alone in one story home and requires assist for self care (aids assist) and functional transfers/mobility using RW. Pt has hx of HTN, previous abdominal surgeries. Pt received semi supine in bed, oriented to self and agreeable for OT RA. Pt currently presents with decreased balance, decreased activity tolerance, decreased safety awareness, generalized weakness and increased need for assist with self care (SBA simple grooming EOB, total A LB dressing EOB and toileting simulated) and functional transfers/mobility (min A sup->sit, mod A scooting EOB, mod A sit->sup, max A sit<->Stand x2 trials however unable to transfer to chair 2/2 fatigue and fear). Pt would benefit from skilled OT services while at [facility] in order to increase safety and independence with self care and functional transfers/mobility. Recommend discharge to SNF when medically appropriate.         Other factors to consider for discharge: time since onset, severity of deficits, PLOF         PLAN :  Recommendations and Planned Interventions: self care training, functional mobility training, therapeutic exercise, balance training, therapeutic activities, endurance activities, patient education, and home safety training    Frequency/Duration: Patient will be followed by occupational therapy 5 times a week to address goals. Recommend with staff: bed level toileting    Recommend next OT session: [facility] transfer    Recommendation for discharge: (in order for the patient to meet his/her long term goals)  SNF    This discharge recommendation:  Has been made in collaboration with the attending provider and/or case management    Equipment recommendations for successful discharge (if) home: TBD       SUBJECTIVE:   Patient stated ill try.     OBJECTIVE DATA SUMMARY:   Hospital course since last seen and reason for reevaluation: LOS    Cognitive/Behavioral Status:  Neurologic State: Alert  Orientation Level: Oriented to person  Cognition: Follows commands     Hearing: Auditory  Auditory Impairment: None    Range of Motion:  AROM: Generally decreased, functional     Strength:  Strength: Generally decreased, functional     RUE Strength  Observation: grossly 3-/5     LUE Strength  Observation: grossly 3-/5    Functional Mobility and Transfers for ADLs:  Bed Mobility:  Rolling: Minimum assistance  Supine to Sit: Minimum assistance  Sit to Supine: Moderate assistance  Scooting: Moderate assistance    Transfers:  Sit to Stand: Maximum assistance     Balance:  Sitting: Impaired; With support  Sitting - Static: Fair (occasional)  Sitting - Dynamic: Fair (occasional)  Standing: Impaired;Pull to stand; With support  Standing - Static: Constant support;Poor  Standing - Dynamic : Constant support;Poor    ADL Assessment:     Oral Facial Hygiene/Grooming: Stand-by assistance(seated EOB)    Lower Body Dressing: Total assistance(EOB level )    Toileting:  Total assistance     ADL Intervention and task modifications:     Grooming  Grooming Assistance: Stand-by assistance  Position Performed: Seated edge of bed  Washing Face: Stand-by assistance  Washing Hands: Stand-by assistance    Lower Body Dressing Assistance  Socks: Total assistance (dependent)    Toileting  Toileting Assistance: Total assistance(dependent)(simulated)     Pain:  No pain reported    Activity Tolerance:   Fair  Please refer to the flowsheet for vital signs taken during this treatment. After treatment patient left in no apparent distress:   Supine in bed, Call bell within reach, Bed / chair alarm activated, and Side rails x 3    COMMUNICATION/COLLABORATION:   The patients plan of care was discussed with: Registered nurseRosalina Arora  Time Calculation: 28 mins

## 2020-12-30 LAB
GLUCOSE BLD STRIP.AUTO-MCNC: 112 MG/DL (ref 65–100)
GLUCOSE BLD STRIP.AUTO-MCNC: 117 MG/DL (ref 65–100)
PERFORMED BY, TECHID: ABNORMAL
PERFORMED BY, TECHID: ABNORMAL

## 2020-12-30 PROCEDURE — 92526 ORAL FUNCTION THERAPY: CPT

## 2020-12-30 PROCEDURE — 74011250637 HC RX REV CODE- 250/637: Performed by: NURSE PRACTITIONER

## 2020-12-30 PROCEDURE — 74011250637 HC RX REV CODE- 250/637: Performed by: COLON & RECTAL SURGERY

## 2020-12-30 PROCEDURE — 82962 GLUCOSE BLOOD TEST: CPT

## 2020-12-30 PROCEDURE — 65270000029 HC RM PRIVATE

## 2020-12-30 PROCEDURE — 74011250637 HC RX REV CODE- 250/637: Performed by: SURGERY

## 2020-12-30 PROCEDURE — 99232 SBSQ HOSP IP/OBS MODERATE 35: CPT | Performed by: COLON & RECTAL SURGERY

## 2020-12-30 RX ORDER — ACETAMINOPHEN 325 MG/1
650 TABLET ORAL
Status: DISCONTINUED | OUTPATIENT
Start: 2020-12-30 | End: 2020-12-31 | Stop reason: HOSPADM

## 2020-12-30 RX ADMIN — POLYETHYLENE GLYCOL 3350 17 G: 17 POWDER, FOR SOLUTION ORAL at 08:51

## 2020-12-30 RX ADMIN — NIFEDIPINE 30 MG: 30 TABLET, FILM COATED, EXTENDED RELEASE ORAL at 08:51

## 2020-12-30 RX ADMIN — ACETAMINOPHEN 650 MG: 325 TABLET, FILM COATED ORAL at 16:08

## 2020-12-30 RX ADMIN — DICYCLOMINE HYDROCHLORIDE 10 MG: 10 CAPSULE ORAL at 08:51

## 2020-12-30 RX ADMIN — DICYCLOMINE HYDROCHLORIDE 10 MG: 10 CAPSULE ORAL at 16:08

## 2020-12-30 RX ADMIN — LOSARTAN POTASSIUM 50 MG: 50 TABLET, FILM COATED ORAL at 08:51

## 2020-12-30 RX ADMIN — DICYCLOMINE HYDROCHLORIDE 10 MG: 10 CAPSULE ORAL at 21:27

## 2020-12-30 NOTE — PROGRESS NOTES
ANGELITA notes patient is able to go to [place] is obtained. Per Dr. Jeremiah Morgan, the patient can discharge in the morning. ANGELITA notified Lazarus Myrtle and is pending a bed number and number for report.

## 2020-12-30 NOTE — PROGRESS NOTES
SPEECH LANGUAGE PATHOLOGY DYSPHAGIA TREATMENT  Patient: Venu Deleon (22 y.o. female)  Date: 12/30/2020  Diagnosis: [facility] [D74.535] <principal problem not specified>         ASSESSMENT:  Administered thin liquids via straw. Patient ingested w/ consecutive swallows. Swallow initiation timely. HLE and protraction adequate to palpation. No overt s/sx of aspiration noted w/ 12 oz of thin liquids via straw. Administered puree. Patient w/ intermittent mastication, much like chew pattern w/ puree bolus. Prolonged oral prep. Swallow initiation timely. Pharyngeal swallow adequate. Patient consumed 100% of her puree meal w/ feeding assistance. Patient did exhibit some grimacing after the swallow. When asked if she was in pain, patient pointed to her throat. ?able secondary spasm, pressure s/t G.I issues. PLAN:  Recommendations and Planned Interventions:  Continue puree diet, advance to thin liquids. No further acute ST needs warranted at this time. Please re consult if change in status. Discharge Recommendations: To Be Determined     SUBJECTIVE:   Patient seen at bedside. She is resting, but alerts to verbal and tactile cues. Largely non-verbal at this time, does answer yes/no questions w/ head nod. OBJECTIVE:     CXR Results  (Last 48 hours)      None          CT Results  (Last 48 hours)      None           Cognitive and Communication Status:  Neurologic State: Alert  Orientation Level: Oriented to person, Disoriented to place, Disoriented to situation, Disoriented to time  Cognition: Follows commands, Memory loss       Pain:  Pain Scale 1: Numeric (0 - 10)  Pain Intensity 1: 0       After treatment:   Patient left in no apparent distress in bed    COMMUNICATION/EDUCATION:   Patient was educated regarding her deficit(s) of dysphagia, swallow safety precautions, diet recs and POC. She demonstrated Fair understanding as evidenced by impaired cognition.         Nisha Vázquez Zi MAGUIRE CCC-SLP  Time Calculation: 16 mins           Problem: Dysphagia (Adult)  Goal: *Acute Goals and Plan of Care (Insert Text)  Description: Speech Therapy Goals  Initiated 12/24/2020  -Patient will tolerate d2 diet with nectar thick liquids without signs/symptoms of aspiration given minimal cues within 7 day(s). [ ] Not met  [ ]  MET   [ ] Progressing  [ ] Quenten Less  -Patient will tolerate trials of diet upgrade with SLP only without overt s/s aspiration within 7 days. [ ] Not met  [ ]  MET   [ ] Progressing  [ ] Quenten Less  -Patient will demonstrate understanding of swallow safety precautions and aspiration precautions, diet recs with minimal cues within 7 day(s).         [ ] Not met  [ ]  MET   [ ] Progressing  [ ] Discontinue          Outcome: Resolved/Met

## 2020-12-30 NOTE — PROGRESS NOTES
Progress Note    Patient: Bernadette Batista MRN: [id number]  SSN: [id number]    YOB: 1927  Age: 80 y.o. Sex: female      Admit Date: 12/11/2020    LOS: 19 days     Subjective:   80-year-old female admitted December 11, 2020 with partial small bowel obstruction  Has been tolerating oral intake however continues to complain of abdominal pain  No bowel movement recorded yesterday    Objective:     Vitals:    12/29/20 1559 12/29/20 1939 12/30/20 0329 12/30/20 0749   BP: (!) 104/54 124/63 (!) 147/78 (!) 149/74   Pulse: 78 88 76 73   Resp: 18 18 18 18   Temp: 97.6 °F (36.4 °C) 98.7 °F (37.1 °C) 97.9 °F (36.6 °C) 98 °F (36.7 °C)   SpO2: 97% 96% 99% 98%   Weight:  96 lb 12.5 oz (43.9 kg)     Height:            Intake and Output:  Current Shift: No intake/output data recorded. Last three shifts: 12/28 1901 - 12/30 0700  In: 300 [P.O.:300]  Out: -     Review of Systems:  ROS     Physical Exam:   Abdomen is soft, nondistended, tender palpation in the mid and right lower abdomen, no rebound or guarding    Lab/Data Review:  Recent Results (from the past 24 hour(s))   GLUCOSE, POC    Collection Time: 12/29/20 11:27 AM   Result Value Ref Range    Glucose (POC) 106 (H) 65 - 100 mg/dL    Performed by Rose Marie Castillo    GLUCOSE, POC    Collection Time: 12/30/20  7:53 AM   Result Value Ref Range    Glucose (POC) 112 (H) 65 - 100 mg/dL    Performed by Rupinder Bianca           Assessment:     Active Problems:    Small bowel obstruction ([address] (12/11/2020)        Plan:   Although Ms. Mary Oneill continues to tolerate oral intake she does continue to complain of abdominal pain. She is tolerating oral intake. Will check labs on her today. Ms. Mary Oneill has indicated that she does not wish to have any surgical intervention.   Continue with case management for SNF/rehab placement    Signed By: Roger Jones MD     December 30, 2020

## 2020-12-31 VITALS
OXYGEN SATURATION: 99 % | TEMPERATURE: 97.7 F | HEIGHT: 63 IN | DIASTOLIC BLOOD PRESSURE: 73 MMHG | WEIGHT: 96.78 LBS | SYSTOLIC BLOOD PRESSURE: 154 MMHG | HEART RATE: 67 BPM | BODY MASS INDEX: 17.15 KG/M2 | RESPIRATION RATE: 16 BRPM

## 2020-12-31 LAB
GLUCOSE BLD STRIP.AUTO-MCNC: 112 MG/DL (ref 65–100)
GLUCOSE BLD STRIP.AUTO-MCNC: 123 MG/DL (ref 65–100)
PERFORMED BY, TECHID: ABNORMAL
PERFORMED BY, TECHID: ABNORMAL

## 2020-12-31 PROCEDURE — 74011250637 HC RX REV CODE- 250/637: Performed by: COLON & RECTAL SURGERY

## 2020-12-31 PROCEDURE — 99238 HOSP IP/OBS DSCHRG MGMT 30/<: CPT | Performed by: COLON & RECTAL SURGERY

## 2020-12-31 PROCEDURE — 82962 GLUCOSE BLOOD TEST: CPT

## 2020-12-31 PROCEDURE — 74011250637 HC RX REV CODE- 250/637: Performed by: NURSE PRACTITIONER

## 2020-12-31 PROCEDURE — 74011250637 HC RX REV CODE- 250/637: Performed by: SURGERY

## 2020-12-31 RX ORDER — FAMOTIDINE 20 MG/1
20 TABLET, FILM COATED ORAL
Qty: 60 TAB | Refills: 1 | Status: SHIPPED | OUTPATIENT
Start: 2020-12-31

## 2020-12-31 RX ORDER — LOSARTAN POTASSIUM 50 MG/1
50 TABLET ORAL DAILY
Qty: 60 TAB | Refills: 1 | Status: SHIPPED | OUTPATIENT
Start: 2020-12-31

## 2020-12-31 RX ORDER — NIFEDIPINE 30 MG/1
30 TABLET, EXTENDED RELEASE ORAL DAILY
Qty: 60 TAB | Refills: 1 | Status: SHIPPED | OUTPATIENT
Start: 2020-12-31

## 2020-12-31 RX ORDER — OXYCODONE HCL 5 MG/5 ML
5 SOLUTION, ORAL ORAL
Qty: 100 ML | Refills: 0 | Status: SHIPPED | OUTPATIENT
Start: 2020-12-31 | End: 2021-01-05

## 2020-12-31 RX ORDER — POLYETHYLENE GLYCOL 3350 17 G/17G
17 POWDER, FOR SOLUTION ORAL DAILY
Qty: 30 PACKET | Refills: 1 | Status: SHIPPED | OUTPATIENT
Start: 2020-12-31 | End: 2021-01-30

## 2020-12-31 RX ADMIN — LOSARTAN POTASSIUM 50 MG: 50 TABLET, FILM COATED ORAL at 09:25

## 2020-12-31 RX ADMIN — FAMOTIDINE 20 MG: 20 TABLET ORAL at 09:25

## 2020-12-31 RX ADMIN — POLYETHYLENE GLYCOL 3350 17 G: 17 POWDER, FOR SOLUTION ORAL at 09:25

## 2020-12-31 RX ADMIN — NIFEDIPINE 30 MG: 30 TABLET, FILM COATED, EXTENDED RELEASE ORAL at 09:25

## 2020-12-31 RX ADMIN — DICYCLOMINE HYDROCHLORIDE 10 MG: 10 CAPSULE ORAL at 09:25

## 2020-12-31 NOTE — PROGRESS NOTES
Patient is being discharged today to [facility] to room 13; nurse can call report to [phone] ask to speak to unit 2 nurse. CM reviewed IMM with patient. CM completed discharge checklist.    CM spoke to sarah Mendoza [phone]) regarding patient being discharged to [facility] today.

## 2020-12-31 NOTE — PROGRESS NOTES
Patient is accepted to [facility]. CM uploaded discharge order, discharge summary, and MAR into [facility]. CM is waiting for [facility] to provide room number for patient to go. CM will continue to follow.

## 2020-12-31 NOTE — PROGRESS NOTES
Discharge plan of care/case management plan validated with provider discharge order. Patient discharged to [facility] via ambulance per doctors orders. Verbal and written discharge instructions provided to Vanessa LANDON. [facility] notified of discharge .

## 2020-12-31 NOTE — DISCHARGE SUMMARY
Admit date: 12/11/2020   Admitting Provider: Ellie Joseph MD    Discharge date: 12/31/2020  Discharging Provider: Ellie Joseph MD      * Admission Diagnoses: Small bowel obstruction [facility]) [T06.380]    * Discharge Diagnoses:  Same  Hospital Problems as of 12/31/2020 Never Reviewed          Codes Class Noted - Resolved POA    Small bowel obstruction [facility]) ICD-10-CM: Y04.829  ICD-9-CM: 560.9  12/11/2020 - Present Unknown              * Hospital Course: Patient is a 54-year-old female who presented to the emergency department on December 11, 2020 with complaints of abdominal pain with nausea and vomiting. At the time of presentation states she has been having pain for several days and at first thought she was having constipation but then started vomiting in the emergency department. She had a CT scan which demonstrated distended stomach with fluid and air and distended small bowel with a transition point in the mid abdomen. The colon was normal in caliber. Large volume of stool was noted within the rectum. She was admitted and treated conservatively with nasogastric decompression. Her nasogastric tube was discontinued on December 18, 2020. She was started on clear liquids that she was tolerating. She started on full liquids on December 21. On December 23 she did have a CT scan of the abdomen pelvis with contrast demonstrated contrast passing the colon although was an area of narrowing of the distal ileum. She has been tolerating liquids with no problem. She is having nutritional supplements. I had extensive discussion with the patient as to Dr. Latia Billy and she expressed her wishes to have no surgical intervention. She has been able to maintain hydration. In the hospital she was also seen by cardiology secondary to complaint of chest pain. When cardiology saw her she was not having any chest pain or shortness of breath. She had cardiac enzymes and EKG. Had a echo.   Echo demonstrated left ventricular ejection fraction of 65 to 70%. Noted to have pulmonary hypertension. Her EKG showed no acute changes a first-degree AV block. Cardiac enzymes were negative. She was at skilled nursing facility on a puréed diet with nutritional supplements. * Procedures: None        Consults: Hospitalist, Cardiology    Significant Diagnostic Studies:  CT scan abdomen pelvis December 23, 2020:  Liver, pancreas, spleen, bilateral adrenal glands, bilateral kidneys unchanged. Gallbladder nondistended.     Oral contrast and air through distended stomach. Oral contrast filled distended small bowel loops to the level pelvis. Exact  transition point difficult to define; suspect this is over segment of ileum. Some oral contrast, stool, and air present through colon. Colonic diverticula  present, no CT evidence for diverticulitis. No ascites.     Atherosclerotic change normal caliber abdominal aorta with extension to pelvic  arteries. Left hip hardware. Discharge Exam:  Physical Exam  Constitutional:       General: She is not in acute distress. Appearance: She is not ill-appearing. Comments: Frail-appearing elderly female   HENT:      Head: Normocephalic and atraumatic. Neck:      Musculoskeletal: Normal range of motion and neck supple. Cardiovascular:      Rate and Rhythm: Normal rate and regular rhythm. Pulmonary:      Breath sounds: Normal breath sounds. Abdominal:      General: There is no distension. Palpations: Abdomen is soft. Tenderness: There is abdominal tenderness (Mild tenderness palpation lower abdomen and right side of the abdomen). There is no guarding or rebound. Hernia: No hernia is present. Musculoskeletal: Normal range of motion. Skin:     General: Skin is warm and dry. Neurological:      Mental Status: Mental status is at baseline. Psychiatric:         Mood and Affect: Mood normal.         Thought Content:  Thought content normal.         * Discharge Condition: stable  * Disposition: [place] (SNF)    Discharge Medications:  Current Discharge Medication List      START taking these medications    Details   famotidine (PEPCID) 20 mg tablet Take 1 Tab by mouth every fourty-eight (48) hours. Qty: 60 Tab, Refills: 1      losartan (COZAAR) 50 mg tablet Take 1 Tab by mouth daily. Qty: 60 Tab, Refills: 1      NIFEdipine ER (PROCARDIA XL) 30 mg ER tablet Take 1 Tab by mouth daily. Qty: 60 Tab, Refills: 1      oxyCODONE (ROXICODONE) 5 mg/5 mL solution Take 5 mL by mouth every six (6) hours as needed for Moderate Pain for up to 5 days. Max Daily Amount: 20 mg.  Qty: 100 mL, Refills: 0    Associated Diagnoses: Acute abdominal pain; Small bowel obstruction (HCC)      polyethylene glycol (MIRALAX) 17 gram packet Take 1 Packet by mouth daily for 30 days. Qty: 30 Packet, Refills: 1             * Follow-up Care/Patient Instructions:   Activity: Activity as tolerated  Diet: Dysphagia diet-pureed  Wound Care: None needed    Follow-up Information     Follow up With Specialties Details Why Contact Info    Efren Ramírez MD Colon and Rectal Surgery, General Surgery In 3 weeks  [address] [place] [address] [phone]      Barrett Huff MD Cardiology   [address] [place] [address] [phone]            Signed:  Isabelle Torres MD  12/31/2020  7:00 AM

## 2021-03-01 ENCOUNTER — TRANSCRIBE ORDER (OUTPATIENT)
Dept: SCHEDULING | Age: 86
End: 2021-03-01

## 2021-03-01 DIAGNOSIS — J98.59 MEDIASTINAL MASS: Primary | ICD-10-CM

## 2021-03-01 DIAGNOSIS — C38.3: Primary | ICD-10-CM

## 2021-03-09 ENCOUNTER — TRANSCRIBE ORDER (OUTPATIENT)
Dept: REGISTRATION | Age: 86
End: 2021-03-09

## 2021-03-09 ENCOUNTER — HOSPITAL ENCOUNTER (OUTPATIENT)
Dept: LAB | Age: 86
Discharge: HOME | End: 2021-03-09
Payer: MEDICARE

## 2021-03-09 ENCOUNTER — HOSPITAL ENCOUNTER (OUTPATIENT)
Dept: CT IMAGING | Age: 86
Discharge: HOME | End: 2021-03-09
Payer: MEDICARE

## 2021-03-09 DIAGNOSIS — J98.59 MEDIASTINAL MASS: ICD-10-CM

## 2021-03-09 DIAGNOSIS — J98.59 OTHER DISEASES OF MEDIASTINUM, NOT ELSEWHERE CLASSIFIED: Primary | ICD-10-CM

## 2021-03-09 DIAGNOSIS — J98.59 OTHER DISEASES OF MEDIASTINUM, NOT ELSEWHERE CLASSIFIED: ICD-10-CM

## 2021-03-09 LAB — CREAT SERPL-MCNC: 1.01 MG/DL (ref 0.55–1.02)

## 2021-03-09 PROCEDURE — 71260 CT THORAX DX C+: CPT

## 2021-03-09 PROCEDURE — 82565 ASSAY OF CREATININE: CPT

## 2021-03-09 PROCEDURE — 74011000636 HC RX REV CODE- 636: Performed by: INTERNAL MEDICINE

## 2021-03-09 PROCEDURE — 74170 CT ABD WO CNTRST FLWD CNTRST: CPT

## 2021-03-09 PROCEDURE — 36415 COLL VENOUS BLD VENIPUNCTURE: CPT

## 2021-03-09 RX ADMIN — IOPAMIDOL 75 ML: 755 INJECTION, SOLUTION INTRAVENOUS at 15:51

## 2022-03-19 PROBLEM — K56.609 SMALL BOWEL OBSTRUCTION (HCC): Status: ACTIVE | Noted: 2020-12-11

## 2023-05-22 RX ORDER — FAMOTIDINE 20 MG/1
20 TABLET, FILM COATED ORAL
COMMUNITY
Start: 2020-12-31

## 2023-05-22 RX ORDER — LOSARTAN POTASSIUM 50 MG/1
50 TABLET ORAL DAILY
COMMUNITY
Start: 2020-12-31

## 2023-05-22 RX ORDER — NIFEDIPINE 30 MG/1
30 TABLET, EXTENDED RELEASE ORAL DAILY
COMMUNITY
Start: 2020-12-31